# Patient Record
Sex: MALE | Race: WHITE | NOT HISPANIC OR LATINO | Employment: FULL TIME | URBAN - METROPOLITAN AREA
[De-identification: names, ages, dates, MRNs, and addresses within clinical notes are randomized per-mention and may not be internally consistent; named-entity substitution may affect disease eponyms.]

---

## 2023-04-19 ENCOUNTER — HOSPITAL ENCOUNTER (OUTPATIENT)
Dept: DATA CONVERSION | Facility: HOSPITAL | Age: 68
End: 2023-04-19
Attending: OPHTHALMOLOGY | Admitting: OPHTHALMOLOGY
Payer: MEDICARE

## 2023-04-19 DIAGNOSIS — E78.5 HYPERLIPIDEMIA, UNSPECIFIED: ICD-10-CM

## 2023-04-19 DIAGNOSIS — I10 ESSENTIAL (PRIMARY) HYPERTENSION: ICD-10-CM

## 2023-04-19 DIAGNOSIS — Z79.84 LONG TERM (CURRENT) USE OF ORAL HYPOGLYCEMIC DRUGS: ICD-10-CM

## 2023-04-19 DIAGNOSIS — Z91.040 LATEX ALLERGY STATUS: ICD-10-CM

## 2023-04-19 DIAGNOSIS — K21.9 GASTRO-ESOPHAGEAL REFLUX DISEASE WITHOUT ESOPHAGITIS: ICD-10-CM

## 2023-04-19 DIAGNOSIS — R06.83 SNORING: ICD-10-CM

## 2023-04-19 DIAGNOSIS — H25.12 AGE-RELATED NUCLEAR CATARACT, LEFT EYE: ICD-10-CM

## 2023-04-19 DIAGNOSIS — F79 UNSPECIFIED INTELLECTUAL DISABILITIES: ICD-10-CM

## 2023-04-19 DIAGNOSIS — G80.9 CEREBRAL PALSY, UNSPECIFIED (MULTI): ICD-10-CM

## 2023-04-19 DIAGNOSIS — E11.9 TYPE 2 DIABETES MELLITUS WITHOUT COMPLICATIONS (MULTI): ICD-10-CM

## 2023-04-19 DIAGNOSIS — H40.9 UNSPECIFIED GLAUCOMA: ICD-10-CM

## 2023-04-19 DIAGNOSIS — J44.9 CHRONIC OBSTRUCTIVE PULMONARY DISEASE, UNSPECIFIED (MULTI): ICD-10-CM

## 2023-04-19 DIAGNOSIS — G47.33 OBSTRUCTIVE SLEEP APNEA (ADULT) (PEDIATRIC): ICD-10-CM

## 2023-04-19 DIAGNOSIS — Z79.899 OTHER LONG TERM (CURRENT) DRUG THERAPY: ICD-10-CM

## 2023-04-19 DIAGNOSIS — F17.210 NICOTINE DEPENDENCE, CIGARETTES, UNCOMPLICATED: ICD-10-CM

## 2023-04-19 LAB — POCT GLUCOSE: 122 MG/DL (ref 74–99)

## 2023-06-13 LAB
ALANINE AMINOTRANSFERASE (SGPT) (U/L) IN SER/PLAS: 14 U/L (ref 10–52)
ALBUMIN (G/DL) IN SER/PLAS: 4.2 G/DL (ref 3.4–5)
ALBUMIN (MG/L) IN URINE: 37.8 MG/L
ALBUMIN/CREATININE (UG/MG) IN URINE: 52.6 UG/MG CRT (ref 0–30)
ALKALINE PHOSPHATASE (U/L) IN SER/PLAS: 58 U/L (ref 33–136)
ANION GAP IN SER/PLAS: 10 MMOL/L (ref 10–20)
APPEARANCE, URINE: ABNORMAL
ASPARTATE AMINOTRANSFERASE (SGOT) (U/L) IN SER/PLAS: 15 U/L (ref 9–39)
BACTERIA, URINE: ABNORMAL /HPF
BILIRUBIN TOTAL (MG/DL) IN SER/PLAS: 0.5 MG/DL (ref 0–1.2)
BILIRUBIN, URINE: NEGATIVE
BLOOD, URINE: NEGATIVE
CALCIUM (MG/DL) IN SER/PLAS: 11 MG/DL (ref 8.6–10.6)
CALCIUM OXALATE CRYSTALS, URINE: ABNORMAL /HPF
CARBON DIOXIDE, TOTAL (MMOL/L) IN SER/PLAS: 31 MMOL/L (ref 21–32)
CHLORIDE (MMOL/L) IN SER/PLAS: 106 MMOL/L (ref 98–107)
CHOLESTEROL (MG/DL) IN SER/PLAS: 123 MG/DL (ref 0–199)
CHOLESTEROL IN HDL (MG/DL) IN SER/PLAS: 47.3 MG/DL
CHOLESTEROL/HDL RATIO: 2.6
COLOR, URINE: YELLOW
CREATININE (MG/DL) IN SER/PLAS: 0.98 MG/DL (ref 0.5–1.3)
CREATININE (MG/DL) IN URINE: 71.9 MG/DL (ref 20–370)
ESTIMATED AVERAGE GLUCOSE FOR HBA1C: 140 MG/DL
GFR MALE: 84 ML/MIN/1.73M2
GLUCOSE (MG/DL) IN SER/PLAS: 120 MG/DL (ref 74–99)
GLUCOSE, URINE: NEGATIVE MG/DL
HEMOGLOBIN A1C/HEMOGLOBIN TOTAL IN BLOOD: 6.5 %
KETONES, URINE: NEGATIVE MG/DL
LDL: 62 MG/DL (ref 0–99)
LEUKOCYTE ESTERASE, URINE: ABNORMAL
MUCUS, URINE: ABNORMAL /LPF
NITRITE, URINE: NEGATIVE
PH, URINE: 5 (ref 5–8)
POTASSIUM (MMOL/L) IN SER/PLAS: 4.7 MMOL/L (ref 3.5–5.3)
PROTEIN TOTAL: 7 G/DL (ref 6.4–8.2)
PROTEIN, URINE: NEGATIVE MG/DL
RBC, URINE: ABNORMAL /HPF (ref 0–5)
SODIUM (MMOL/L) IN SER/PLAS: 142 MMOL/L (ref 136–145)
SPECIFIC GRAVITY, URINE: 1.01 (ref 1–1.03)
TRIGLYCERIDE (MG/DL) IN SER/PLAS: 68 MG/DL (ref 0–149)
UREA NITROGEN (MG/DL) IN SER/PLAS: 23 MG/DL (ref 6–23)
UROBILINOGEN, URINE: <2 MG/DL (ref 0–1.9)
VLDL: 14 MG/DL (ref 0–40)
WBC, URINE: 17 /HPF (ref 0–5)

## 2023-09-07 VITALS
SYSTOLIC BLOOD PRESSURE: 113 MMHG | TEMPERATURE: 98.2 F | HEART RATE: 85 BPM | RESPIRATION RATE: 16 BRPM | DIASTOLIC BLOOD PRESSURE: 71 MMHG

## 2023-09-11 PROBLEM — H40.9 GLAUCOMA OF BOTH EYES: Status: ACTIVE | Noted: 2023-09-11

## 2023-09-11 PROBLEM — H10.10 ACUTE ALLERGIC CONJUNCTIVITIS: Status: ACTIVE | Noted: 2023-09-11

## 2023-09-11 PROBLEM — H25.12 CATARACT, NUCLEAR SCLEROTIC, LEFT EYE: Status: ACTIVE | Noted: 2023-09-11

## 2023-09-11 PROBLEM — H04.123 DRY EYE SYNDROME, BILATERAL: Status: ACTIVE | Noted: 2023-09-11

## 2023-09-11 PROBLEM — H72.92 PERFORATION OF LEFT TYMPANIC MEMBRANE: Status: ACTIVE | Noted: 2023-09-11

## 2023-09-11 PROBLEM — F79 INTELLECTUAL DISABILITY: Status: ACTIVE | Noted: 2023-09-11

## 2023-09-11 PROBLEM — H61.23 BILATERAL IMPACTED CERUMEN: Status: ACTIVE | Noted: 2023-09-11

## 2023-09-11 PROBLEM — E11.9 DIABETES MELLITUS TYPE 2 WITHOUT RETINOPATHY (MULTI): Status: ACTIVE | Noted: 2023-09-11

## 2023-09-11 PROBLEM — G80.9 CEREBRAL PALSY (MULTI): Status: ACTIVE | Noted: 2023-09-11

## 2023-09-11 PROBLEM — H44.22: Status: ACTIVE | Noted: 2023-09-11

## 2023-09-11 PROBLEM — H90.6 MIXED CONDUCTIVE AND SENSORINEURAL HEARING LOSS OF BOTH EARS: Status: ACTIVE | Noted: 2023-09-11

## 2023-09-11 PROBLEM — H40.1190 PRIMARY OPEN ANGLE GLAUCOMA: Status: ACTIVE | Noted: 2023-09-11

## 2023-09-11 PROBLEM — H18.20 CORNEAL EDEMA OF RIGHT EYE: Status: ACTIVE | Noted: 2023-09-11

## 2023-09-11 PROBLEM — H69.93 DYSFUNCTION OF BOTH EUSTACHIAN TUBES: Status: ACTIVE | Noted: 2023-09-11

## 2023-09-11 PROBLEM — E78.00 HIGH CHOLESTEROL: Status: ACTIVE | Noted: 2023-09-11

## 2023-09-11 RX ORDER — BRIMONIDINE TARTRATE 2 MG/ML
1 SOLUTION/ DROPS OPHTHALMIC 2 TIMES DAILY
COMMUNITY
Start: 2023-03-20 | End: 2023-11-21 | Stop reason: SDUPTHER

## 2023-09-11 RX ORDER — NITROFURANTOIN (MACROCRYSTALS) 100 MG/1
100 CAPSULE ORAL
COMMUNITY
Start: 2021-10-25 | End: 2024-03-18 | Stop reason: WASHOUT

## 2023-09-11 RX ORDER — MONTELUKAST SODIUM 10 MG/1
10 TABLET ORAL DAILY
COMMUNITY
End: 2024-02-29 | Stop reason: SDUPTHER

## 2023-09-11 RX ORDER — TOBRAMYCIN AND DEXAMETHASONE 3; 1 MG/G; MG/G
OINTMENT OPHTHALMIC
COMMUNITY
End: 2024-03-18 | Stop reason: WASHOUT

## 2023-09-11 RX ORDER — IBUPROFEN 100 MG/5ML
1000 SUSPENSION, ORAL (FINAL DOSE FORM) ORAL DAILY
COMMUNITY

## 2023-09-11 RX ORDER — FLAXSEED OIL 1000 MG
1000 CAPSULE ORAL DAILY
COMMUNITY

## 2023-09-11 RX ORDER — OLOPATADINE HYDROCHLORIDE 2 MG/ML
1 SOLUTION/ DROPS OPHTHALMIC
COMMUNITY
Start: 2014-09-08

## 2023-09-11 RX ORDER — BLOOD-GLUCOSE METER
KIT MISCELLANEOUS
COMMUNITY
End: 2023-11-16 | Stop reason: SDUPTHER

## 2023-09-11 RX ORDER — ACETAZOLAMIDE 500 MG/1
500 CAPSULE, EXTENDED RELEASE ORAL 2 TIMES DAILY
COMMUNITY
End: 2023-11-02 | Stop reason: WASHOUT

## 2023-09-11 RX ORDER — ARTIFICIAL TEARS 1; 2; 3 MG/ML; MG/ML; MG/ML
1 SOLUTION/ DROPS OPHTHALMIC 4 TIMES DAILY
COMMUNITY

## 2023-09-11 RX ORDER — GLIMEPIRIDE 4 MG/1
1 TABLET ORAL DAILY
COMMUNITY
Start: 2023-03-09 | End: 2024-06-05 | Stop reason: SDUPTHER

## 2023-09-11 RX ORDER — PANTOPRAZOLE SODIUM 40 MG/1
40 TABLET, DELAYED RELEASE ORAL DAILY
COMMUNITY
End: 2023-10-24 | Stop reason: SDUPTHER

## 2023-09-11 RX ORDER — METFORMIN HYDROCHLORIDE 500 MG/1
1000 TABLET, EXTENDED RELEASE ORAL 2 TIMES DAILY
COMMUNITY
End: 2023-12-27 | Stop reason: SDUPTHER

## 2023-09-11 RX ORDER — KETOROLAC TROMETHAMINE 5 MG/ML
1 SOLUTION OPHTHALMIC 4 TIMES DAILY
COMMUNITY
Start: 2023-02-14

## 2023-09-11 RX ORDER — OFLOXACIN 3 MG/ML
SOLUTION/ DROPS OPHTHALMIC
COMMUNITY
Start: 2023-02-14 | End: 2024-06-07 | Stop reason: WASHOUT

## 2023-09-11 RX ORDER — LOSARTAN POTASSIUM 25 MG/1
25 TABLET ORAL DAILY
COMMUNITY
End: 2024-02-29 | Stop reason: SDUPTHER

## 2023-09-11 RX ORDER — PREDNISOLONE ACETATE 10 MG/ML
1 SUSPENSION/ DROPS OPHTHALMIC 4 TIMES DAILY
COMMUNITY
Start: 2023-02-14 | End: 2024-03-18 | Stop reason: WASHOUT

## 2023-09-11 RX ORDER — ATORVASTATIN CALCIUM 40 MG/1
40 TABLET, FILM COATED ORAL DAILY
COMMUNITY

## 2023-09-11 RX ORDER — BRIMONIDINE TARTRATE 1.5 MG/ML
SOLUTION/ DROPS OPHTHALMIC
COMMUNITY
Start: 2021-06-15 | End: 2023-11-17 | Stop reason: SDUPTHER

## 2023-09-11 RX ORDER — MINERAL OIL
180 ENEMA (ML) RECTAL DAILY PRN
COMMUNITY

## 2023-09-14 NOTE — H&P
History of Present Illness:   History Present Illness:  Reason for surgery: Cataract and glaucoma, left eye   HPI:    Patient presents for planned cataract extraction with IOL implantation and Express shunt implantation, left eye, for glaucoma and cataract, left eye    Allergies:        Allergies:  ·  Latex : Itching  ·  bacitracin  eye ointment - redness, eye soreness: Unknown    Home Medication Review:   Home Medications Reviewed: yes     Impression/Procedure:   ·  Impression and Planned Procedure: Patient presents for planned cataract extraction with IOL implantation and Express shunt implantation, left eye, for glaucoma and cataract, left eye       ERAS (Enhanced Recovery After Surgery):  ·  ERAS Patient: no       Vital Signs:  Temperature C: 36.8 degrees C   Temperature F: 98.2 degrees F   Heart Rate: 85 beats per minute   Respiratory Rate: 16 breath per minute   Blood Pressure Systolic: 113 mm/Hg   Blood Pressure Diastolic: 71 mm/Hg     Physical Exam by System:    Constitutional: Well appearing, no acute distress   Head/Neck: Normocephalic atraumatic   Respiratory/Thorax: No respiratory distress, equal  chest rise   Cardiovascular: Well perfused, regular rate   Gastrointestinal: Non distended   Neurological: Alert, oriented, interactive   Psychological: Normal affect   Skin: No gross rash or skin breakdown     Consent:   COVID-19 Consent:  ·  COVID-19 Risk Consent Surgeon has reviewed key risks related to the risk of ryanne COVID-19 and if they contract COVID-19 what the risks are.     Attestation:   Note Completion:  I am a:  Resident/Fellow   Attending Attestation I saw and evaluated the patient.  I personally obtained the key and critical portions of the history and physical exam or was physically present for key and  critical portions performed by the resident/fellow. I reviewed the resident/fellow?s documentation and discussed the patient with the resident/fellow.  I agree with the  resident/fellow?s medical decision making as documented in the note.     I personally evaluated the patient on 19-Apr-2023         Electronic Signatures:  Floyd Rivera (Resident))  (Signed 19-Apr-2023 07:21)   Authored: History of Present Illness, Allergies, Home  Medication Review, Impression/Procedure, ERAS, Physical Exam, Consent, Note Completion  Panfilo Ingram)  (Signed 30-Apr-2023 14:37)   Authored: Note Completion   Co-Signer: History of Present Illness, Allergies, Home Medication Review, Impression/Procedure, ERAS, Physical Exam, Consent, Note Completion      Last Updated: 30-Apr-2023 14:37 by Panfilo Ingram (MD)

## 2023-10-02 NOTE — OP NOTE
Post Operative Note:     PreOp Diagnosis: Cataract, left eye and glaucoma,  left eye   Post-Procedure Diagnosis: Cataract, left eye and  glaucoma, left eye   Procedure: 1. Cataract extraction via phacoemulsification  with intraocular lens implantation, left eye  2.   3.   4.   5.   Surgeon: Dr Ingram   Resident/Fellow/Other Assistant: Dr Rivera   Estimated Blood Loss (mL): <1cc   Specimen: no   Findings: Cataract, left eye     Attestation:   Note Completion:  I am a: Resident/Fellow   Attending Attestation I performed the procedure without a resident          Electronic Signatures:  Floyd Rivera (Resident))  (Signed 19-Apr-2023 09:00)   Authored: Post Operative Note, Note Completion  Panfilo Ingram)  (Signed 30-Apr-2023 14:37)   Authored: Note Completion   Co-Signer: Post Operative Note, Note Completion      Last Updated: 30-Apr-2023 14:37 by Panfilo Ingram)

## 2023-10-04 ENCOUNTER — LAB (OUTPATIENT)
Dept: LAB | Facility: LAB | Age: 68
End: 2023-10-04
Payer: MEDICARE

## 2023-10-04 ENCOUNTER — TELEPHONE (OUTPATIENT)
Dept: PRIMARY CARE | Facility: CLINIC | Age: 68
End: 2023-10-04

## 2023-10-04 DIAGNOSIS — Z87.891 SMOKING HISTORY: ICD-10-CM

## 2023-10-04 DIAGNOSIS — E11.9 TYPE 2 DIABETES MELLITUS WITHOUT COMPLICATIONS (MULTI): Primary | ICD-10-CM

## 2023-10-04 DIAGNOSIS — E78.2 MIXED HYPERLIPIDEMIA: ICD-10-CM

## 2023-10-04 DIAGNOSIS — Z72.0 TOBACCO ABUSE: Primary | ICD-10-CM

## 2023-10-04 DIAGNOSIS — D63.8 ANEMIA IN OTHER CHRONIC DISEASES CLASSIFIED ELSEWHERE: ICD-10-CM

## 2023-10-04 DIAGNOSIS — I10 ESSENTIAL (PRIMARY) HYPERTENSION: ICD-10-CM

## 2023-10-04 LAB
ALBUMIN SERPL BCP-MCNC: 4.2 G/DL (ref 3.4–5)
ALP SERPL-CCNC: 52 U/L (ref 33–136)
ALT SERPL W P-5'-P-CCNC: 15 U/L (ref 10–52)
ANION GAP SERPL CALC-SCNC: 13 MMOL/L (ref 10–20)
AST SERPL W P-5'-P-CCNC: 15 U/L (ref 9–39)
BASOPHILS # BLD AUTO: 0.03 X10*3/UL (ref 0–0.1)
BASOPHILS NFR BLD AUTO: 0.6 %
BILIRUB SERPL-MCNC: 0.4 MG/DL (ref 0–1.2)
BUN SERPL-MCNC: 22 MG/DL (ref 6–23)
CALCIUM SERPL-MCNC: 11 MG/DL (ref 8.6–10.6)
CHLORIDE SERPL-SCNC: 104 MMOL/L (ref 98–107)
CHOLEST SERPL-MCNC: 131 MG/DL (ref 0–199)
CHOLESTEROL/HDL RATIO: 3
CO2 SERPL-SCNC: 29 MMOL/L (ref 21–32)
CREAT SERPL-MCNC: 0.98 MG/DL (ref 0.5–1.3)
EOSINOPHIL # BLD AUTO: 0 X10*3/UL (ref 0–0.7)
EOSINOPHIL NFR BLD AUTO: 0 %
ERYTHROCYTE [DISTWIDTH] IN BLOOD BY AUTOMATED COUNT: 13.7 % (ref 11.5–14.5)
EST. AVERAGE GLUCOSE BLD GHB EST-MCNC: 148 MG/DL
GFR SERPL CREATININE-BSD FRML MDRD: 85 ML/MIN/1.73M*2
GLUCOSE SERPL-MCNC: 118 MG/DL (ref 74–99)
HBA1C MFR BLD: 6.8 %
HCT VFR BLD AUTO: 36.4 % (ref 41–52)
HDLC SERPL-MCNC: 44 MG/DL
HGB BLD-MCNC: 11.6 G/DL (ref 13.5–17.5)
IMM GRANULOCYTES # BLD AUTO: 0.01 X10*3/UL (ref 0–0.7)
IMM GRANULOCYTES NFR BLD AUTO: 0.2 % (ref 0–0.9)
LDLC SERPL CALC-MCNC: 71 MG/DL (ref 140–190)
LYMPHOCYTES # BLD AUTO: 1.75 X10*3/UL (ref 1.2–4.8)
LYMPHOCYTES NFR BLD AUTO: 33.3 %
MCH RBC QN AUTO: 32.7 PG (ref 26–34)
MCHC RBC AUTO-ENTMCNC: 31.9 G/DL (ref 32–36)
MCV RBC AUTO: 103 FL (ref 80–100)
MONOCYTES # BLD AUTO: 0.72 X10*3/UL (ref 0.1–1)
MONOCYTES NFR BLD AUTO: 13.7 %
NEUTROPHILS # BLD AUTO: 2.75 X10*3/UL (ref 1.2–7.7)
NEUTROPHILS NFR BLD AUTO: 52.2 %
NON HDL CHOLESTEROL: 87 MG/DL (ref 0–149)
NRBC BLD-RTO: 0 /100 WBCS (ref 0–0)
PLATELET # BLD AUTO: 222 X10*3/UL (ref 150–450)
PMV BLD AUTO: 11.4 FL (ref 7.5–11.5)
POTASSIUM SERPL-SCNC: 5.2 MMOL/L (ref 3.5–5.3)
PROT SERPL-MCNC: 6.9 G/DL (ref 6.4–8.2)
RBC # BLD AUTO: 3.55 X10*6/UL (ref 4.5–5.9)
SODIUM SERPL-SCNC: 141 MMOL/L (ref 136–145)
TRIGL SERPL-MCNC: 82 MG/DL (ref 0–149)
TSH SERPL-ACNC: 2.03 MIU/L (ref 0.44–3.98)
VIT B12 SERPL-MCNC: 549 PG/ML (ref 211–911)
VLDL: 16 MG/DL (ref 0–40)
WBC # BLD AUTO: 5.3 X10*3/UL (ref 4.4–11.3)

## 2023-10-04 PROCEDURE — 36415 COLL VENOUS BLD VENIPUNCTURE: CPT

## 2023-10-04 NOTE — TELEPHONE ENCOUNTER
Pts insurance will not accept Low dose ct scan because it has the wrong code on paper.    Please with right code to Fax- [phone]

## 2023-10-06 NOTE — TELEPHONE ENCOUNTER
Christian calling from [facility] is calling in regards to the message yesterday, the ct scan code. He said he did not receive the new order.  Fax number- [phone]

## 2023-10-16 ENCOUNTER — OFFICE VISIT (OUTPATIENT)
Dept: OPHTHALMOLOGY | Facility: CLINIC | Age: 68
End: 2023-10-16
Payer: MEDICARE

## 2023-10-16 DIAGNOSIS — H40.1133 PRIMARY OPEN ANGLE GLAUCOMA (POAG) OF BOTH EYES, SEVERE STAGE: Primary | ICD-10-CM

## 2023-10-16 PROCEDURE — 99212 OFFICE O/P EST SF 10 MIN: CPT | Performed by: OPHTHALMOLOGY

## 2023-10-16 ASSESSMENT — CUP TO DISC RATIO
OS_RATIO: 95
OD_RATIO: 99

## 2023-10-16 ASSESSMENT — TONOMETRY
OS_IOP_MMHG: 10
OD_IOP_MMHG: 14
IOP_METHOD: GOLDMANN APPLANATION

## 2023-10-16 ASSESSMENT — VISUAL ACUITY
OD_SC: NLP
METHOD: SNELLEN - LINEAR
OS_SC: CF

## 2023-10-16 ASSESSMENT — SLIT LAMP EXAM - LIDS
COMMENTS: NORMAL
COMMENTS: NORMAL

## 2023-10-17 ENCOUNTER — OFFICE VISIT (OUTPATIENT)
Dept: OTOLARYNGOLOGY | Facility: CLINIC | Age: 68
End: 2023-10-17
Payer: MEDICARE

## 2023-10-17 ENCOUNTER — ANCILLARY PROCEDURE (OUTPATIENT)
Dept: RADIOLOGY | Facility: CLINIC | Age: 68
End: 2023-10-17
Payer: MEDICARE

## 2023-10-17 ENCOUNTER — APPOINTMENT (OUTPATIENT)
Dept: RADIOLOGY | Facility: CLINIC | Age: 68
End: 2023-10-17
Payer: MEDICARE

## 2023-10-17 VITALS — WEIGHT: 161 LBS | BODY MASS INDEX: 24.4 KG/M2 | TEMPERATURE: 97.5 F | HEIGHT: 68 IN

## 2023-10-17 DIAGNOSIS — Z87.891 SMOKING HISTORY: ICD-10-CM

## 2023-10-17 DIAGNOSIS — H90.6 MIXED CONDUCTIVE AND SENSORINEURAL HEARING LOSS OF BOTH EARS: ICD-10-CM

## 2023-10-17 DIAGNOSIS — F79 INTELLECTUAL DISABILITY: ICD-10-CM

## 2023-10-17 DIAGNOSIS — H69.93 DYSFUNCTION OF BOTH EUSTACHIAN TUBES: Primary | ICD-10-CM

## 2023-10-17 PROCEDURE — 4010F ACE/ARB THERAPY RXD/TAKEN: CPT | Performed by: OTOLARYNGOLOGY

## 2023-10-17 PROCEDURE — 71271 CT THORAX LUNG CANCER SCR C-: CPT | Performed by: RADIOLOGY

## 2023-10-17 PROCEDURE — 3044F HG A1C LEVEL LT 7.0%: CPT | Performed by: OTOLARYNGOLOGY

## 2023-10-17 PROCEDURE — 3048F LDL-C <100 MG/DL: CPT | Performed by: OTOLARYNGOLOGY

## 2023-10-17 PROCEDURE — 71271 CT THORAX LUNG CANCER SCR C-: CPT | Mod: ME

## 2023-10-17 PROCEDURE — 1159F MED LIST DOCD IN RCRD: CPT | Performed by: OTOLARYNGOLOGY

## 2023-10-17 PROCEDURE — 99213 OFFICE O/P EST LOW 20 MIN: CPT | Performed by: OTOLARYNGOLOGY

## 2023-10-17 NOTE — PROGRESS NOTES
Chief Complaint   Patient presents with    Follow-up     EP- EAR CHECK      HPI  He is here for every 3 month ear cleaning  Benjie Waters is a 68 y.o. male with recurrent cerumen impactions. He is s/p left tube placed March 23, 2022 and a right perforation. 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   History: This is a 66-year-old white male with severe MR. He is status post left myringotomy that has healed and right tympanic membrane perforation. He has a chronic eustachian tube dysfunction bilaterally. He smokes. He is using Allegra for his allergies      Past Medical History:   Diagnosis Date    Acute atopic conjunctivitis, unspecified eye 12/27/2017    Acute allergic conjunctivitis    Acute atopic conjunctivitis, unspecified eye 12/27/2017    Acute allergic conjunctivitis    Aphakia, unspecified eye 10/04/2022    Aphakia    Disorder of lipoprotein metabolism, unspecified     Abnormal cholesterol test    Dry eye syndrome of bilateral lacrimal glands 12/27/2017    Dry eye syndrome, bilateral    Dry eye syndrome of bilateral lacrimal glands 12/27/2017    Dry eye syndrome, bilateral    Encounter for other general examination     Podiatry visit, routine    Essential (primary) hypertension 07/27/2013    Hypertension    Ischemic optic neuropathy, unspecified eye 10/04/2022    AION (anterior ischemic optic neuropathy)    Other specified diabetes mellitus without complications (CMS/HCC)     Diabetes mellitus of other type without complication    Personal history of other diseases of the nervous system and sense organs     History of obstructive sleep apnea    Personal history of other diseases of the respiratory system     History of chronic obstructive lung disease    Personal history of other infectious and parasitic diseases     History of athlete's foot    Primary open-angle glaucoma, unspecified eye, severe stage 12/27/2017    Chronic glaucoma, severe stage    Tinea unguium      Mycotic toenails            Medications:     Current Outpatient Medications:     acetaZOLAMIDE (Diamox) 500 mg 12 hr capsule, Take 1 capsule (500 mg) by mouth 2 times a day. TAKE ONCE AT 6:00 PM AFTER EFT EYE CATARACT SURGERY, Disp: , Rfl:     artificial tears, dextran-hypomel-glycerin, 0.1-0.3-0.2 % ophthalmic solution, Administer 1 drop into affected eye(s) 4 times a day., Disp: , Rfl:     ascorbic acid (Vitamin C) 1,000 mg tablet, Take 1 tablet (1,000 mg) by mouth once daily., Disp: , Rfl:     atorvastatin (Lipitor) 40 mg tablet, Take 1 tablet (40 mg) by mouth once daily., Disp: , Rfl:     brimonidine (AlphaGAN P) 0.15 % ophthalmic solution, Administer into affected eye(s). PER DIRECTED, Disp: , Rfl:     brimonidine (AlphaGAN P) 0.2 % ophthalmic solution, Administer 1 drop into both eyes 2 times a day., Disp: , Rfl:     fexofenadine (Allegra Allergy) 180 mg tablet, Take 1 tablet (180 mg) by mouth. TAKE PER DIRECTED, Disp: , Rfl:     flaxseed oiL 1,000 mg capsule, Take by mouth. TAKE PER DIECTED, Disp: , Rfl:     FreeStyle Lite Strips strip, DO PER DIRECTED, Disp: , Rfl:     glimepiride (Amaryl) 4 mg tablet, Take 1 tablet (4 mg) by mouth once daily., Disp: , Rfl:     GLIMEPIRIDE ORAL, Take by mouth. PER DIRECTED, Disp: , Rfl:     ketorolac (Acular) 0.5 % ophthalmic solution, 1 drop 4 times a day. OPERATIVE EYE, Disp: , Rfl:     losartan (Cozaar) 25 mg tablet, Take 1 tablet (25 mg) by mouth once daily., Disp: , Rfl:     metFORMIN  mg 24 hr tablet, Take 2 tablets (1,000 mg) by mouth 2 times a day., Disp: , Rfl:     montelukast (Singulair) 10 mg tablet, Take 1 tablet (10 mg) by mouth once daily., Disp: , Rfl:     multivit-min/FA/lycopen/lutein (COMPLETE MULTI 50+ ORAL), Take 1 capsule by mouth once daily., Disp: , Rfl:     nitrofurantoin (Macrodantin) 100 mg capsule, Take 1 capsule (100 mg) by mouth. TAKE PER DIRECTED, Disp: , Rfl:     ofloxacin (Ocuflox) 0.3 % ophthalmic solution, DO PER DIRECTED, Disp: , Rfl:  "    olopatadine (Pataday) 0.2 % ophthalmic solution, Administer 1 drop into affected eye(s) once daily. PER DIRECTED, Disp: , Rfl:     pantoprazole (ProtoNix) 40 mg EC tablet, Take 1 tablet (40 mg) by mouth once daily., Disp: , Rfl:     prednisoLONE acetate (Pred-Forte) 1 % ophthalmic suspension, 1 drop 4 times a day. AFFECTED EYE, Disp: , Rfl:     Tobradex ophthalmic ointment, DO PER DIRECTD, Disp: , Rfl:     vit A/vit C/vit E/zinc/copper (VITAMINS A,C,E-ZINC-COPPER ORAL), Take by mouth. TAKE PER DIRECTED, Disp: , Rfl:      Allergies:  Allergies   Allergen Reactions    Bacitracin Unknown    Erythromycin Unknown        Physical Exam:  Last Recorded Vitals  Temperature 36.4 °C (97.5 °F), height 1.727 m (5' 8\"), weight 73 kg (161 lb).  []General appearance: Well-developed, well-nourished in no acute distress, conversant with normal voice quality    Head/face: No erythema or edema or facial tenderness, and normal facial nerve function bilaterally    External ear: Clear external auditory canals with normal pinnae, bilateral cerumen impactions removed  Tube status: Left T-tube in place  Middle ear: Large right tympanic membrane perforation.  Left middle ear mucosa looks normal  Tympanic membrane perforation: N/A  Mastoid bowl: N/A  Hearing: Normal conversational awareness at normal speech thresholds    Nose visualized using: Anterior rhinoscopy  Nasal dorsum: Nontraumatic midline appearance  Septum: Midline, nonobstructing  Inferior turbinates: Normal, pink  Secretions: Dry    Oral cavity and oropharynx: Normal  Teeth: Good condition  Floor of mouth: without lesions  Palate: Normal hard palate, soft palate and uvula  Oropharynx: Clear, no lesions present  Buccal mucosa: Normal without masses or lesions  Lips: Normal    Nasopharynx: Inadequate mirror exam secondary to gag/anatomy    Neck:  Salivary glands: Normal bilateral parotid and submandibular glands by inspection and palpation.  Non-thyroid masses: No palpable " masses or significant lymphadenopathy  Trachea: Midline  Thyroid: No thyromegaly or palpable nodules  Temporomandibular joint: Nontender  Cervical range of motion: Normal    Neurologic exam: Alert and oriented x3, appropriate affect.  Cranial nerves II-XII normal bilaterally  Extraocular movement: Extraocular movement intact, normal gaze alignment    Assessment/Plan   No diagnosis found.      Recheck in 3 months.  Tube in good position.    Lupillo Weinstein MD

## 2023-10-20 ENCOUNTER — TELEPHONE (OUTPATIENT)
Dept: PRIMARY CARE | Facility: CLINIC | Age: 68
End: 2023-10-20
Payer: MEDICARE

## 2023-10-20 DIAGNOSIS — D64.9 ANEMIA, UNSPECIFIED TYPE: ICD-10-CM

## 2023-10-20 NOTE — TELEPHONE ENCOUNTER
Pt sister says message was left for a fax number to be given for CT scan. Fax Number- [phone]

## 2023-10-20 NOTE — TELEPHONE ENCOUNTER
----- Message from Meseret De La Torre PA-C sent at 10/20/2023  7:51 AM EDT -----  Regarding: labs  Please let him know his anemia is a little worse. He needs to follow up with hematology. If he needs a referral, ok to refer to  Hematology  ----- Message -----  From: Lab, Background User  Sent: 10/4/2023   1:41 PM EDT  To: Meseret De La Torre PA-C

## 2023-10-24 DIAGNOSIS — K21.9 CHRONIC GERD: ICD-10-CM

## 2023-10-24 RX ORDER — PANTOPRAZOLE SODIUM 40 MG/1
40 TABLET, DELAYED RELEASE ORAL DAILY
Qty: 90 TABLET | Refills: 0 | Status: SHIPPED | OUTPATIENT
Start: 2023-10-24 | End: 2024-01-22

## 2023-10-24 NOTE — TELEPHONE ENCOUNTER
Rx Refill Request Telephone Encounter    Name:  Benjie Waters  :  [id number]  Medication Name:   Pantoprazole 40 mg            Specific Pharmacy location:   [facility] [phone]  Date of last appointment:    Date of next appointment:    Best number to reach patient:

## 2023-11-02 ENCOUNTER — OFFICE VISIT (OUTPATIENT)
Dept: PRIMARY CARE | Facility: CLINIC | Age: 68
End: 2023-11-02
Payer: MEDICARE

## 2023-11-02 VITALS
WEIGHT: 161 LBS | OXYGEN SATURATION: 99 % | BODY MASS INDEX: 24.48 KG/M2 | SYSTOLIC BLOOD PRESSURE: 114 MMHG | DIASTOLIC BLOOD PRESSURE: 64 MMHG | HEART RATE: 74 BPM

## 2023-11-02 DIAGNOSIS — E78.00 HIGH CHOLESTEROL: Primary | ICD-10-CM

## 2023-11-02 DIAGNOSIS — R91.1 LUNG NODULE: ICD-10-CM

## 2023-11-02 DIAGNOSIS — G80.8 OTHER CEREBRAL PALSY (MULTI): ICD-10-CM

## 2023-11-02 DIAGNOSIS — J43.8 OTHER EMPHYSEMA (MULTI): ICD-10-CM

## 2023-11-02 PROCEDURE — 99212 OFFICE O/P EST SF 10 MIN: CPT | Performed by: PHYSICIAN ASSISTANT

## 2023-11-02 PROCEDURE — 3078F DIAST BP <80 MM HG: CPT | Performed by: PHYSICIAN ASSISTANT

## 2023-11-02 PROCEDURE — 3048F LDL-C <100 MG/DL: CPT | Performed by: PHYSICIAN ASSISTANT

## 2023-11-02 PROCEDURE — 3074F SYST BP LT 130 MM HG: CPT | Performed by: PHYSICIAN ASSISTANT

## 2023-11-02 PROCEDURE — 4010F ACE/ARB THERAPY RXD/TAKEN: CPT | Performed by: PHYSICIAN ASSISTANT

## 2023-11-02 PROCEDURE — 1159F MED LIST DOCD IN RCRD: CPT | Performed by: PHYSICIAN ASSISTANT

## 2023-11-02 PROCEDURE — 3044F HG A1C LEVEL LT 7.0%: CPT | Performed by: PHYSICIAN ASSISTANT

## 2023-11-02 PROCEDURE — 1126F AMNT PAIN NOTED NONE PRSNT: CPT | Performed by: PHYSICIAN ASSISTANT

## 2023-11-02 ASSESSMENT — PAIN SCALES - GENERAL: PAINLEVEL: 0-NO PAIN

## 2023-11-02 NOTE — PROGRESS NOTES
Subjective   Patient ID: Benjie Waters is a 68 y.o. male who presents for Follow-up (Lung CT).    Patient is here with his sister for follow-up of his CT scan.  She states he did receive a letter from the hospital stating he had a slight abnormality and need another CT in 3 months.Patient has a longstanding history of smoking.         Review of Systems   Respiratory:  Positive for cough and wheezing. Negative for choking, chest tightness and shortness of breath.        Objective   /64   Pulse 74   Wt 73 kg (161 lb)   SpO2 99%   BMI 24.48 kg/m²     Physical Exam  Neurological:      Mental Status: He is alert. Mental status is at baseline.   Psychiatric:         Mood and Affect: Mood normal.         Thought Content: Thought content normal.         Assessment/Plan   Problem List Items Addressed This Visit             ICD-10-CM    Cerebral palsy (CMS/HCC) G80.9    High cholesterol - Primary E78.00    Relevant Orders    CT cardiac scoring wo IV contrast     Other Visit Diagnoses         Codes    Lung nodule     R91.1    Other emphysema (CMS/HCC)     J43.8        We will repeat a CT in 3 months.  Discussed the importance of quitting smoking as well.

## 2023-11-04 ASSESSMENT — ENCOUNTER SYMPTOMS
COUGH: 1
CHEST TIGHTNESS: 0
CHOKING: 0
SHORTNESS OF BREATH: 0
WHEEZING: 1

## 2023-11-16 DIAGNOSIS — E11.9 DIABETES MELLITUS TYPE 2 WITHOUT RETINOPATHY (MULTI): ICD-10-CM

## 2023-11-16 RX ORDER — BLOOD-GLUCOSE METER
KIT MISCELLANEOUS
Qty: 100 EACH | Refills: 3 | Status: SHIPPED | OUTPATIENT
Start: 2023-11-16

## 2023-11-17 DIAGNOSIS — H40.1132 PRIMARY OPEN ANGLE GLAUCOMA (POAG) OF BOTH EYES, MODERATE STAGE: Primary | ICD-10-CM

## 2023-11-17 RX ORDER — BRIMONIDINE TARTRATE 1.5 MG/ML
1 SOLUTION/ DROPS OPHTHALMIC 2 TIMES DAILY
Qty: 10 ML | Refills: 3 | Status: SHIPPED | OUTPATIENT
Start: 2023-11-17 | End: 2023-11-21 | Stop reason: SDUPTHER

## 2023-11-21 DIAGNOSIS — H40.1133 PRIMARY OPEN ANGLE GLAUCOMA (POAG) OF BOTH EYES, SEVERE STAGE: ICD-10-CM

## 2023-11-21 DIAGNOSIS — H40.233 INTERMITTENT PRIMARY ANGLE-CLOSURE GLAUCOMA OF BOTH EYES: ICD-10-CM

## 2023-11-21 RX ORDER — BRIMONIDINE TARTRATE 2 MG/ML
1 SOLUTION/ DROPS OPHTHALMIC 2 TIMES DAILY
Qty: 15 ML | Refills: 6 | Status: SHIPPED | OUTPATIENT
Start: 2023-11-21 | End: 2024-11-20

## 2023-11-27 ENCOUNTER — HOSPITAL ENCOUNTER (OUTPATIENT)
Dept: RADIOLOGY | Facility: HOSPITAL | Age: 68
Discharge: HOME | End: 2023-11-27
Payer: MEDICARE

## 2023-11-27 DIAGNOSIS — E78.00 HIGH CHOLESTEROL: ICD-10-CM

## 2023-11-27 PROCEDURE — 75571 CT HRT W/O DYE W/CA TEST: CPT

## 2023-12-15 ENCOUNTER — OFFICE VISIT (OUTPATIENT)
Dept: HEMATOLOGY/ONCOLOGY | Facility: CLINIC | Age: 68
End: 2023-12-15
Payer: MEDICARE

## 2023-12-15 VITALS
HEART RATE: 78 BPM | TEMPERATURE: 96.8 F | HEIGHT: 66 IN | DIASTOLIC BLOOD PRESSURE: 70 MMHG | BODY MASS INDEX: 25.19 KG/M2 | SYSTOLIC BLOOD PRESSURE: 105 MMHG | OXYGEN SATURATION: 97 % | WEIGHT: 156.75 LBS | RESPIRATION RATE: 18 BRPM

## 2023-12-15 DIAGNOSIS — D64.9 ANEMIA, UNSPECIFIED TYPE: ICD-10-CM

## 2023-12-15 LAB
ALBUMIN SERPL BCP-MCNC: 4.3 G/DL (ref 3.4–5)
ALP SERPL-CCNC: 55 U/L (ref 33–136)
ALT SERPL W P-5'-P-CCNC: 16 U/L (ref 10–52)
ANION GAP SERPL CALC-SCNC: 12 MMOL/L (ref 10–20)
AST SERPL W P-5'-P-CCNC: 15 U/L (ref 9–39)
BASOPHILS # BLD AUTO: 0.02 X10*3/UL (ref 0–0.1)
BASOPHILS NFR BLD AUTO: 0.4 %
BILIRUB SERPL-MCNC: 0.4 MG/DL (ref 0–1.2)
BUN SERPL-MCNC: 21 MG/DL (ref 6–23)
CALCIUM SERPL-MCNC: 11.1 MG/DL (ref 8.6–10.3)
CHLORIDE SERPL-SCNC: 100 MMOL/L (ref 98–107)
CMV IGG AVIDITY SERPL IA-RTO: NONREACTIVE %
CO2 SERPL-SCNC: 31 MMOL/L (ref 21–32)
CREAT SERPL-MCNC: 1.13 MG/DL (ref 0.5–1.3)
EBV EA IGG SER QL: NEGATIVE
EBV NA AB SER QL: POSITIVE
EBV VCA IGG SER IA-ACNC: POSITIVE
EBV VCA IGM SER IA-ACNC: ABNORMAL
EOSINOPHIL # BLD AUTO: 0.07 X10*3/UL (ref 0–0.7)
EOSINOPHIL NFR BLD AUTO: 1.4 %
ERYTHROCYTE [DISTWIDTH] IN BLOOD BY AUTOMATED COUNT: 13.5 % (ref 11.5–14.5)
FERRITIN SERPL-MCNC: 65 NG/ML (ref 20–300)
GFR SERPL CREATININE-BSD FRML MDRD: 71 ML/MIN/1.73M*2
GLUCOSE SERPL-MCNC: 228 MG/DL (ref 74–99)
HCT VFR BLD AUTO: 35.5 % (ref 41–52)
HGB BLD-MCNC: 11.7 G/DL (ref 13.5–17.5)
HGB RETIC QN: 37 PG (ref 28–38)
HIV 1+2 AB+HIV1 P24 AG SERPL QL IA: NONREACTIVE
IGA SERPL-MCNC: 264 MG/DL (ref 70–400)
IGG SERPL-MCNC: 1200 MG/DL (ref 700–1600)
IGM SERPL-MCNC: 60 MG/DL (ref 40–230)
IMM GRANULOCYTES # BLD AUTO: 0.01 X10*3/UL (ref 0–0.7)
IMM GRANULOCYTES NFR BLD AUTO: 0.2 % (ref 0–0.9)
IMMATURE RETIC FRACTION: 5.5 %
IRON SATN MFR SERPL: 31 % (ref 25–45)
IRON SERPL-MCNC: 102 UG/DL (ref 35–150)
LDH SERPL L TO P-CCNC: 106 U/L (ref 84–246)
LYMPHOCYTES # BLD AUTO: 1.44 X10*3/UL (ref 1.2–4.8)
LYMPHOCYTES NFR BLD AUTO: 28.5 %
MCH RBC QN AUTO: 32.6 PG (ref 26–34)
MCHC RBC AUTO-ENTMCNC: 33 G/DL (ref 32–36)
MCV RBC AUTO: 99 FL (ref 80–100)
MONOCYTES # BLD AUTO: 0.58 X10*3/UL (ref 0.1–1)
MONOCYTES NFR BLD AUTO: 11.5 %
NEUTROPHILS # BLD AUTO: 2.93 X10*3/UL (ref 1.2–7.7)
NEUTROPHILS NFR BLD AUTO: 58 %
NRBC BLD-RTO: 0 /100 WBCS (ref 0–0)
PLATELET # BLD AUTO: 195 X10*3/UL (ref 150–450)
POTASSIUM SERPL-SCNC: 4.5 MMOL/L (ref 3.5–5.3)
PROT SERPL-MCNC: 7.4 G/DL (ref 6.4–8.2)
PROT SERPL-MCNC: 7.4 G/DL (ref 6.4–8.2)
RBC # BLD AUTO: 3.59 X10*6/UL (ref 4.5–5.9)
RETICS #: 0.03 X10*6/UL (ref 0.02–0.11)
RETICS/RBC NFR AUTO: 1 % (ref 0.5–2)
SODIUM SERPL-SCNC: 138 MMOL/L (ref 136–145)
TESTOST SERPL-MCNC: 395 NG/DL (ref 240–1000)
TIBC SERPL-MCNC: 331 UG/DL (ref 240–445)
TSH SERPL-ACNC: 1.83 MIU/L (ref 0.44–3.98)
UIBC SERPL-MCNC: 229 UG/DL (ref 110–370)
WBC # BLD AUTO: 5.1 X10*3/UL (ref 4.4–11.3)

## 2023-12-15 PROCEDURE — G0452 MOLECULAR PATHOLOGY INTERPR: HCPCS | Performed by: NURSE PRACTITIONER

## 2023-12-15 PROCEDURE — 84155 ASSAY OF PROTEIN SERUM: CPT | Mod: 59 | Performed by: NURSE PRACTITIONER

## 2023-12-15 PROCEDURE — 86320 SERUM IMMUNOELECTROPHORESIS: CPT | Performed by: NURSE PRACTITIONER

## 2023-12-15 PROCEDURE — 86645 CMV ANTIBODY IGM: CPT | Performed by: NURSE PRACTITIONER

## 2023-12-15 PROCEDURE — 3044F HG A1C LEVEL LT 7.0%: CPT | Performed by: NURSE PRACTITIONER

## 2023-12-15 PROCEDURE — 84443 ASSAY THYROID STIM HORMONE: CPT | Performed by: NURSE PRACTITIONER

## 2023-12-15 PROCEDURE — 3078F DIAST BP <80 MM HG: CPT | Performed by: NURSE PRACTITIONER

## 2023-12-15 PROCEDURE — 87389 HIV-1 AG W/HIV-1&-2 AB AG IA: CPT | Performed by: NURSE PRACTITIONER

## 2023-12-15 PROCEDURE — 3074F SYST BP LT 130 MM HG: CPT | Performed by: NURSE PRACTITIONER

## 2023-12-15 PROCEDURE — 82728 ASSAY OF FERRITIN: CPT | Performed by: NURSE PRACTITIONER

## 2023-12-15 PROCEDURE — 85045 AUTOMATED RETICULOCYTE COUNT: CPT | Performed by: NURSE PRACTITIONER

## 2023-12-15 PROCEDURE — 86665 EPSTEIN-BARR CAPSID VCA: CPT | Performed by: NURSE PRACTITIONER

## 2023-12-15 PROCEDURE — 4010F ACE/ARB THERAPY RXD/TAKEN: CPT | Performed by: NURSE PRACTITIONER

## 2023-12-15 PROCEDURE — 83540 ASSAY OF IRON: CPT | Performed by: NURSE PRACTITIONER

## 2023-12-15 PROCEDURE — 83550 IRON BINDING TEST: CPT | Performed by: NURSE PRACTITIONER

## 2023-12-15 PROCEDURE — 36415 COLL VENOUS BLD VENIPUNCTURE: CPT | Performed by: NURSE PRACTITIONER

## 2023-12-15 PROCEDURE — 1159F MED LIST DOCD IN RCRD: CPT | Performed by: NURSE PRACTITIONER

## 2023-12-15 PROCEDURE — 84403 ASSAY OF TOTAL TESTOSTERONE: CPT | Performed by: NURSE PRACTITIONER

## 2023-12-15 PROCEDURE — 85025 COMPLETE CBC W/AUTO DIFF WBC: CPT | Performed by: NURSE PRACTITIONER

## 2023-12-15 PROCEDURE — 1126F AMNT PAIN NOTED NONE PRSNT: CPT | Performed by: NURSE PRACTITIONER

## 2023-12-15 PROCEDURE — 99215 OFFICE O/P EST HI 40 MIN: CPT | Performed by: NURSE PRACTITIONER

## 2023-12-15 PROCEDURE — 86334 IMMUNOFIX E-PHORESIS SERUM: CPT | Performed by: NURSE PRACTITIONER

## 2023-12-15 PROCEDURE — 99205 OFFICE O/P NEW HI 60 MIN: CPT | Performed by: NURSE PRACTITIONER

## 2023-12-15 PROCEDURE — 86644 CMV ANTIBODY: CPT | Performed by: NURSE PRACTITIONER

## 2023-12-15 PROCEDURE — 83615 LACTATE (LD) (LDH) ENZYME: CPT | Performed by: NURSE PRACTITIONER

## 2023-12-15 PROCEDURE — 3048F LDL-C <100 MG/DL: CPT | Performed by: NURSE PRACTITIONER

## 2023-12-15 PROCEDURE — 81450 HL NEO GSAP 5-50DNA/DNA&RNA: CPT | Performed by: NURSE PRACTITIONER

## 2023-12-15 PROCEDURE — 83521 IG LIGHT CHAINS FREE EACH: CPT | Performed by: NURSE PRACTITIONER

## 2023-12-15 PROCEDURE — 82784 ASSAY IGA/IGD/IGG/IGM EACH: CPT | Performed by: NURSE PRACTITIONER

## 2023-12-15 PROCEDURE — 84165 PROTEIN E-PHORESIS SERUM: CPT | Performed by: NURSE PRACTITIONER

## 2023-12-15 PROCEDURE — 80053 COMPREHEN METABOLIC PANEL: CPT | Performed by: NURSE PRACTITIONER

## 2023-12-15 ASSESSMENT — ENCOUNTER SYMPTOMS
DEPRESSION: 0
OCCASIONAL FEELINGS OF UNSTEADINESS: 1
LOSS OF SENSATION IN FEET: 0

## 2023-12-15 ASSESSMENT — COLUMBIA-SUICIDE SEVERITY RATING SCALE - C-SSRS
1. IN THE PAST MONTH, HAVE YOU WISHED YOU WERE DEAD OR WISHED YOU COULD GO TO SLEEP AND NOT WAKE UP?: NO
6. HAVE YOU EVER DONE ANYTHING, STARTED TO DO ANYTHING, OR PREPARED TO DO ANYTHING TO END YOUR LIFE?: NO
2. HAVE YOU ACTUALLY HAD ANY THOUGHTS OF KILLING YOURSELF?: NO

## 2023-12-15 ASSESSMENT — PATIENT HEALTH QUESTIONNAIRE - PHQ9
2. FEELING DOWN, DEPRESSED OR HOPELESS: NOT AT ALL
1. LITTLE INTEREST OR PLEASURE IN DOING THINGS: NOT AT ALL
SUM OF ALL RESPONSES TO PHQ9 QUESTIONS 1 AND 2: 0

## 2023-12-15 ASSESSMENT — PAIN SCALES - GENERAL: PAINLEVEL: 0-NO PAIN

## 2023-12-15 NOTE — PROGRESS NOTES
"Patient ID: Benjie Waters is a 68 y.o. male.  Referring Physician: Meseret De La Torre PA-C  [address] [place] [facility]  [address]  [place] [address]  Primary Care Provider: Meseret De La Torre PA-C  Visit Type: Initial Visit      Subjective    HPI  Mr Waters is mentally disabled 69yo who presents with his sister, Jesica Justice, for evaluation of anemia.  Labs done 10/4/23 showed WBC 5.3, hgb 11.6 nand plt 222.  There was noted macrocytosis with  with normal B12 549 and monocytosis 11.7%.  CMP was normal with exception of hypercalcemia of 11.    She denies weight loss, fevers, night sweats  or recurrent infections.   He is heavy smoker and CT scan showed some abnormality that they need to follow up on.  No reported cough, congestion or shortness of breath.   He had colonoscopy 2021 reportedly normal and sister reports diarrhea alternating with constipation, but no melena or hematochezia.  Iron levels were good in 2021 and HCV negative 8/7/21.        Review of Systems - Oncology  as above  He is blind    Objective   BSA: 1.81 meters squared  /70 (BP Location: Right arm, Patient Position: Sitting, BP Cuff Size: Adult long)   Pulse 78   Temp 36 °C (96.8 °F) (Temporal)   Resp 18   Ht 1.666 m (5' 5.59\")   Wt 71.1 kg (156 lb 12 oz)   SpO2 97%   BMI 25.62 kg/m²      has a past medical history of Acute atopic conjunctivitis, unspecified eye (12/27/2017), Acute atopic conjunctivitis, unspecified eye (12/27/2017), Aphakia, unspecified eye (10/04/2022), Disorder of lipoprotein metabolism, unspecified, Dry eye syndrome of bilateral lacrimal glands (12/27/2017), Dry eye syndrome of bilateral lacrimal glands (12/27/2017), Encounter for other general examination, Essential (primary) hypertension (07/27/2013), Ischemic optic neuropathy, unspecified eye (10/04/2022), Other specified diabetes mellitus without complications (CMS/HCC), Personal history of other diseases of the nervous system and " sense organs, Personal history of other diseases of the respiratory system, Personal history of other infectious and parasitic diseases, Primary open-angle glaucoma, unspecified eye, severe stage (2017), and Tinea unguium.   has a past surgical history that includes Eye surgery (07/15/2014) and Other surgical history (2021).  Family History   Problem Relation Name Age of Onset    No Known Problems Mother     Father  of leukemia      Benjie Waters  reports that he has been smoking cigarettes. He has a 50.00 pack-year smoking history. He has been exposed to tobacco smoke. He has never used smokeless tobacco.  He  reports that he does not currently use alcohol.  He  reports no history of drug use.  Here with his sister Jesica Justice [phone] who is his caregiver    Physical Exam  Constitutional:       Comments: He smells strongly of tobacco.   Blind, mentally disabled, follows instructions and can get on exam table, but does not communicate with provider.    Eyes:      Comments: Blind   Cardiovascular:      Rate and Rhythm: Normal rate and regular rhythm.      Pulses: Normal pulses.      Heart sounds: Normal heart sounds. No murmur heard.  Pulmonary:      Effort: Pulmonary effort is normal. No respiratory distress.      Breath sounds: Normal breath sounds.   Abdominal:      Tenderness: There is no abdominal tenderness. There is guarding.      Comments: No HSM, fullness LUQ   Musculoskeletal:         General: No swelling or tenderness.   Lymphadenopathy:      Cervical: No cervical adenopathy.   Skin:     Coloration: Skin is not jaundiced.      Findings: No bruising or lesion.   Neurological:      General: No focal deficit present.      Motor: No weakness.     WBC   Date/Time Value Ref Range Status   10/04/2023 09:19 AM 5.3 4.4 - 11.3 x10*3/uL Final   08/10/2022 09:25 AM 5.8 4.4 - 11.3 x10E9/L Final   2021 09:29 AM 5.0 4.5 - 11.0 K/UL Final   2020 09:55 AM 5.6 4.5 - 11.0 K/UL Final      nRBC   Date Value Ref Range Status   10/04/2023 0.0 0.0 - 0.0 /100 WBCs Final   08/10/2022 0.0 0.0 - 0.0 /100 WBC Final   08/07/2021 0 0 /100 WBC Final   06/03/2020 0 0 /100 WBC Final     RBC   Date Value Ref Range Status   10/04/2023 3.55 (L) 4.50 - 5.90 x10*6/uL Final   08/10/2022 3.78 (L) 4.50 - 5.90 x10E12/L Final   08/07/2021 3.84 (L) 4.5 - 5.5 M/UL Final   06/03/2020 3.88 (L) 4.5 - 5.5 M/UL Final     Hemoglobin   Date Value Ref Range Status   10/04/2023 11.6 (L) 13.5 - 17.5 g/dL Final   08/10/2022 12.5 (L) 13.5 - 17.5 g/dL Final   08/07/2021 12.7 (L) 13.5 - 16.5 GM/DL Final   06/03/2020 12.5 (L) 13.5 - 16.5 GM/DL Final     Hematocrit   Date Value Ref Range Status   10/04/2023 36.4 (L) 41.0 - 52.0 % Final   08/10/2022 38.1 (L) 41.0 - 52.0 % Final   08/07/2021 38.4 (L) 41 - 50 % Final   06/03/2020 39.7 (L) 41 - 50 % Final     MCV   Date/Time Value Ref Range Status   10/04/2023 09:19  (H) 80 - 100 fL Final   08/10/2022 09:25  (H) 80 - 100 fL Final   08/07/2021 09:29 .0 80 - 100 FL Final   06/03/2020 09:55 .3 (H) 80 - 100 FL Final     MCH   Date/Time Value Ref Range Status   10/04/2023 09:19 AM 32.7 26.0 - 34.0 pg Final   08/07/2021 09:29 AM 33.1 26 - 34 PG Final   06/03/2020 09:55 AM 32.2 26 - 34 PG Final     MCHC   Date/Time Value Ref Range Status   10/04/2023 09:19 AM 31.9 (L) 32.0 - 36.0 g/dL Final   08/10/2022 09:25 AM 32.8 32.0 - 36.0 g/dL Final   08/07/2021 09:29 AM 33.1 31 - 37 % Final   06/03/2020 09:55 AM 31.5 31 - 37 % Final     RDW   Date/Time Value Ref Range Status   10/04/2023 09:19 AM 13.7 11.5 - 14.5 % Final   08/10/2022 09:25 AM 13.8 11.5 - 14.5 % Final     Platelets   Date/Time Value Ref Range Status   10/04/2023 09:19  150 - 450 x10*3/uL Final   08/10/2022 09:25  150 - 450 x10E9/L Final   08/07/2021 09:29  150 - 450 K/UL Final   06/03/2020 09:55  150 - 450 K/UL Final     MPV   Date/Time Value Ref Range Status   10/04/2023 09:19 AM 11.4 7.5 -  11.5 fL Final   08/07/2021 09:29 AM 10.8 7.0 - 12.6 CU Final   06/03/2020 09:55 AM 11.0 7.0 - 12.6 CU Final     Neutrophils %   Date/Time Value Ref Range Status   10/04/2023 09:19 AM 52.2 40.0 - 80.0 % Final   08/10/2022 09:25 AM 57.8 40.0 - 80.0 % Final     Immature Granulocytes %, Automated   Date/Time Value Ref Range Status   10/04/2023 09:19 AM 0.2 0.0 - 0.9 % Final     Comment:     Immature Granulocyte Count (IG) includes promyelocytes, myelocytes and metamyelocytes but does not include bands. Percent differential counts (%) should be interpreted in the context of the absolute cell counts (cells/UL).   08/10/2022 09:25 AM 0.3 0.0 - 0.9 % Final     Comment:      Immature Granulocyte Count (IG) includes promyelocytes,    myelocytes and metamyelocytes but does not include bands.   Percent differential counts (%) should be interpreted in the   context of the absolute cell counts (cells/L).       Lymphocytes %   Date/Time Value Ref Range Status   10/04/2023 09:19 AM 33.3 13.0 - 44.0 % Final   08/10/2022 09:25 AM 26.8 13.0 - 44.0 % Final   08/07/2021 09:29 AM 32.00 20 - 40 % Final   06/03/2020 09:55 AM 26.10 20 - 40 % Final   06/07/2019 09:28 AM 28.20 20 - 40 % Final     Monocytes %   Date/Time Value Ref Range Status   10/04/2023 09:19 AM 13.7 2.0 - 10.0 % Final   08/10/2022 09:25 AM 12.4 2.0 - 10.0 % Final   08/07/2021 09:29 AM 11.70 (H) 0 - 8 % Final   06/03/2020 09:55 AM 12.10 (H) 0 - 8 % Final   06/07/2019 09:28 AM 10.20 (H) 0 - 8 % Final     Eosinophils %   Date/Time Value Ref Range Status   10/04/2023 09:19 AM 0.0 0.0 - 6.0 % Final   08/10/2022 09:25 AM 2.2 0.0 - 6.0 % Final     Basophils %   Date/Time Value Ref Range Status   10/04/2023 09:19 AM 0.6 0.0 - 2.0 % Final   08/10/2022 09:25 AM 0.5 0.0 - 2.0 % Final   08/07/2021 09:29 AM 0.40 0 - 1 % Final   06/03/2020 09:55 AM 0.50 0 - 1 % Final     Neutrophils Absolute   Date/Time Value Ref Range Status   10/04/2023 09:19 AM 2.75 1.20 - 7.70 x10*3/uL Final      Comment:     Percent differential counts (%) should be interpreted in the context of the absolute cell counts (cells/uL).   08/10/2022 09:25 AM 3.34 1.20 - 7.70 x10E9/L Final   08/07/2021 09:29 AM 2.77 1.8 - 7.7 K/UL Final   06/03/2020 09:55 AM 3.42 1.8 - 7.7 K/UL Final     Immature Granulocytes Absolute, Automated   Date/Time Value Ref Range Status   10/04/2023 09:19 AM 0.01 0.00 - 0.70 x10*3/uL Final   08/07/2021 09:29 AM 0.01 0.0 - 0.1 K/UL Final   06/03/2020 09:55 AM 0.01 0.0 - 0.1 K/UL Final   06/07/2019 09:28 AM 0.01 0.0 - 0.1 K/UL Final     Lymphocytes Absolute   Date/Time Value Ref Range Status   10/04/2023 09:19 AM 1.75 1.20 - 4.80 x10*3/uL Final   08/10/2022 09:25 AM 1.55 1.20 - 4.80 x10E9/L Final   08/07/2021 09:29 AM 1.59 1.2 - 3.2 K/UL Final   06/03/2020 09:55 AM 1.46 1.2 - 3.2 K/UL Final     Monocytes Absolute   Date/Time Value Ref Range Status   10/04/2023 09:19 AM 0.72 0.10 - 1.00 x10*3/uL Final   08/10/2022 09:25 AM 0.72 0.10 - 1.00 x10E9/L Final   08/07/2021 09:29 AM 0.58 0 - 0.8 K/UL Final   06/03/2020 09:55 AM 0.68 0 - 0.8 K/UL Final     Eosinophils Absolute   Date/Time Value Ref Range Status   10/04/2023 09:19 AM 0.00 0.00 - 0.70 x10*3/uL Final   08/10/2022 09:25 AM 0.13 0.00 - 0.70 x10E9/L Final   08/07/2021 09:29 AM 0.00 0 - 0.45 K/UL Final   06/03/2020 09:55 AM 0.00 0 - 0.45 K/UL Final     Basophils Absolute   Date/Time Value Ref Range Status   10/04/2023 09:19 AM 0.03 0.00 - 0.10 x10*3/uL Final   08/10/2022 09:25 AM 0.03 0.00 - 0.10 x10E9/L Final   08/07/2021 09:29 AM 0.02 0.00 - 0.22 K/UL Final   06/03/2020 09:55 AM 0.03 0.00 - 0.22 K/UL Final           Assessment/Plan    Anemia with macrocytosis and monocytosis in asymptomatic mentally disabled, blind man with normal B12 in Oct 2023 and normal iron and negative HCV in 2021.  Colonoscopy reportedly normal in 2021, but currently with GI symptoms of diarrhea alternating with constipation and fullness LUQ noted on exam.  He was noted to have  hypercalcemia of 11 10/4/23.  He denies specific bone or joint pain.    Will check peripheral blood for flow and malignant heme panel.  Recheck iron levels.  Will check SPEP, immunoglobulin and light chains.  Will check HIV, EBV and CMV  Will call sister with results and discuss follow up and management at that time.     Heavy tobacco abuse with abnormal CT, needs to follow up with PCP to schedule repeat and ask about seeing cardiologist to evaluate calcifications.       Diagnoses and all orders for this visit:  Anemia, unspecified type  -     Referral to Hematology and Oncology  -     CBC and Auto Differential; Future  -     Comprehensive Metabolic Panel; Future  -     Ferritin; Future  -     Iron and TIBC; Future  -     Flow Cytometry Test  -     Immunoglobulins (IgG, IgA, IgM); Future  -     Jessup/Lambda Free Light Chain, Serum; Future  -     Serum Protein Electrophoresis + Immunofixation; Future  -     TSH with reflex to Free T4 if abnormal; Future  -     HIV 1/2 Antigen/Antibody Screen with Reflex to Confirmation; Future  -     CMV IgG, IgM; Future  -     Kourtney-Barr Virus Antibody Panel; Future  -     Lactate Dehydrogenase; Future  -     Reticulocytes; Future  -     Testosterone; Future  -     Myeloid Malignancies Panel; Future           Meseret Hope PA-C

## 2023-12-17 LAB
CMV IGM SERPL-ACNC: <8 AU/ML
EBV VCA IGM SER-ACNC: 18.5 U/ML (ref 0–43.9)
KAPPA LC SERPL-MCNC: 5.67 MG/DL (ref 0.33–1.94)
KAPPA LC/LAMBDA SER: 1.87 {RATIO} (ref 0.26–1.65)
LAMBDA LC SERPL-MCNC: 3.04 MG/DL (ref 0.57–2.63)

## 2023-12-19 LAB
ALBUMIN: 4.2 G/DL (ref 3.4–5)
ALPHA 1 GLOBULIN: 0.3 G/DL (ref 0.2–0.6)
ALPHA 2 GLOBULIN: 0.9 G/DL (ref 0.4–1.1)
BETA GLOBULIN: 0.9 G/DL (ref 0.5–1.2)
GAMMA GLOBULIN: 1.1 G/DL (ref 0.5–1.4)
IMMUNOFIXATION COMMENT: NORMAL
PATH REVIEW - SERUM IMMUNOFIXATION: NORMAL
PATH REVIEW-SERUM PROTEIN ELECTROPHORESIS: NORMAL
PROTEIN ELECTROPHORESIS COMMENT: NORMAL

## 2023-12-20 LAB
ELECTRONICALLY SIGNED BY: NORMAL
MYELOID NGS RESULTS: NORMAL

## 2023-12-22 ENCOUNTER — TELEPHONE (OUTPATIENT)
Dept: HEMATOLOGY/ONCOLOGY | Facility: CLINIC | Age: 68
End: 2023-12-22
Payer: MEDICARE

## 2023-12-26 DIAGNOSIS — E83.52 HYPERCALCEMIA: Primary | ICD-10-CM

## 2023-12-26 NOTE — PROGRESS NOTES
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  Wants to know what this has to do with herpes.  Kourtney Franco virus is in herpes family like chicken pox virus....\"What is Kourtney Barr virus?\".   Answered questions to understanding.   Will recheck labs in 3 months and particularly watch calcium and SPEP.  "

## 2023-12-27 ENCOUNTER — TELEPHONE (OUTPATIENT)
Dept: PRIMARY CARE | Facility: CLINIC | Age: 68
End: 2023-12-27
Payer: MEDICARE

## 2023-12-27 DIAGNOSIS — E11.9 DIABETES MELLITUS TYPE 2 WITHOUT RETINOPATHY (MULTI): ICD-10-CM

## 2023-12-27 RX ORDER — METFORMIN HYDROCHLORIDE 500 MG/1
1000 TABLET, EXTENDED RELEASE ORAL 2 TIMES DAILY
Qty: 360 TABLET | Refills: 0 | Status: SHIPPED | OUTPATIENT
Start: 2023-12-27 | End: 2024-03-18 | Stop reason: SDUPTHER

## 2024-01-16 ENCOUNTER — OFFICE VISIT (OUTPATIENT)
Dept: OTOLARYNGOLOGY | Facility: CLINIC | Age: 69
End: 2024-01-16
Payer: MEDICARE

## 2024-01-16 VITALS — HEIGHT: 66 IN | BODY MASS INDEX: 25.71 KG/M2 | WEIGHT: 160 LBS

## 2024-01-16 DIAGNOSIS — H69.93 DYSFUNCTION OF BOTH EUSTACHIAN TUBES: Primary | ICD-10-CM

## 2024-01-16 DIAGNOSIS — H90.6 MIXED CONDUCTIVE AND SENSORINEURAL HEARING LOSS OF BOTH EARS: ICD-10-CM

## 2024-01-16 DIAGNOSIS — F79 INTELLECTUAL DISABILITY: ICD-10-CM

## 2024-01-16 PROCEDURE — 1126F AMNT PAIN NOTED NONE PRSNT: CPT | Performed by: OTOLARYNGOLOGY

## 2024-01-16 PROCEDURE — 1159F MED LIST DOCD IN RCRD: CPT | Performed by: OTOLARYNGOLOGY

## 2024-01-16 PROCEDURE — 4010F ACE/ARB THERAPY RXD/TAKEN: CPT | Performed by: OTOLARYNGOLOGY

## 2024-01-16 PROCEDURE — 99213 OFFICE O/P EST LOW 20 MIN: CPT | Performed by: OTOLARYNGOLOGY

## 2024-01-16 ASSESSMENT — ENCOUNTER SYMPTOMS
OCCASIONAL FEELINGS OF UNSTEADINESS: 1
LOSS OF SENSATION IN FEET: 0
DEPRESSION: 0

## 2024-01-16 NOTE — PROGRESS NOTES
Chief Complaint   Patient presents with    Follow-up     3 Month follow up- ear check / LOV 1//2023      HPI  He is here for every 3 month ear cleaning  Benije Waters is a 68 y.o. male with recurrent cerumen impactions. He is s/p left tube placed March 23, 2022 and a right perforation. 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   History:  with severe MR. He is status post left myringotomy that has healed and right tympanic membrane perforation. He has a chronic eustachian tube dysfunction bilaterally. He smokes. He is using Allegra for his allergies      Past Medical History:   Diagnosis Date    Acute atopic conjunctivitis, unspecified eye 12/27/2017    Acute allergic conjunctivitis    Acute atopic conjunctivitis, unspecified eye 12/27/2017    Acute allergic conjunctivitis    Aphakia, unspecified eye 10/04/2022    Aphakia    Disorder of lipoprotein metabolism, unspecified     Abnormal cholesterol test    Dry eye syndrome of bilateral lacrimal glands 12/27/2017    Dry eye syndrome, bilateral    Dry eye syndrome of bilateral lacrimal glands 12/27/2017    Dry eye syndrome, bilateral    Encounter for other general examination     Podiatry visit, routine    Essential (primary) hypertension 07/27/2013    Hypertension    Ischemic optic neuropathy, unspecified eye 10/04/2022    AION (anterior ischemic optic neuropathy)    Other specified diabetes mellitus without complications (CMS/HCC)     Diabetes mellitus of other type without complication    Personal history of other diseases of the nervous system and sense organs     History of obstructive sleep apnea    Personal history of other diseases of the respiratory system     History of chronic obstructive lung disease    Personal history of other infectious and parasitic diseases     History of athlete's foot    Primary open-angle glaucoma, unspecified eye, severe stage 12/27/2017    Chronic glaucoma, severe stage    Tinea unguium     Mycotic  toenails            Medications:     Current Outpatient Medications:     artificial tears, dextran-hypomel-glycerin, 0.1-0.3-0.2 % ophthalmic solution, Administer 1 drop into affected eye(s) 4 times a day., Disp: , Rfl:     ascorbic acid (Vitamin C) 1,000 mg tablet, Take 1 tablet (1,000 mg) by mouth once daily., Disp: , Rfl:     atorvastatin (Lipitor) 40 mg tablet, Take 1 tablet (40 mg) by mouth once daily., Disp: , Rfl:     blood sugar diagnostic (FreeStyle Lite Strips) strip, Use once daily and as needed, Disp: 100 each, Rfl: 3    brimonidine (AlphaGAN) 0.2 % ophthalmic solution, Administer 1 drop into both eyes 2 times a day., Disp: 15 mL, Rfl: 6    fexofenadine (Allegra Allergy) 180 mg tablet, Take 1 tablet (180 mg) by mouth. TAKE PER DIRECTED, Disp: , Rfl:     flaxseed oiL 1,000 mg capsule, Take by mouth. TAKE PER DIECTED, Disp: , Rfl:     glimepiride (Amaryl) 4 mg tablet, Take 1 tablet (4 mg) by mouth once daily., Disp: , Rfl:     losartan (Cozaar) 25 mg tablet, Take 1 tablet (25 mg) by mouth once daily., Disp: , Rfl:     metFORMIN  mg 24 hr tablet, Take 2 tablets (1,000 mg) by mouth 2 times a day., Disp: 360 tablet, Rfl: 0    montelukast (Singulair) 10 mg tablet, Take 1 tablet (10 mg) by mouth once daily., Disp: , Rfl:     olopatadine (Pataday) 0.2 % ophthalmic solution, Administer 1 drop into affected eye(s) once daily. PER DIRECTED, Disp: , Rfl:     pantoprazole (ProtoNix) 40 mg EC tablet, Take 1 tablet (40 mg) by mouth once daily., Disp: 90 tablet, Rfl: 0    Tobradex ophthalmic ointment, DO PER DIRECTD, Disp: , Rfl:     GLIMEPIRIDE ORAL, Take by mouth. PER DIRECTED, Disp: , Rfl:     ketorolac (Acular) 0.5 % ophthalmic solution, 1 drop 4 times a day. OPERATIVE EYE, Disp: , Rfl:     multivit-min/FA/lycopen/lutein (COMPLETE MULTI 50+ ORAL), Take 1 capsule by mouth once daily., Disp: , Rfl:     nitrofurantoin (Macrodantin) 100 mg capsule, Take 1 capsule (100 mg) by mouth. TAKE PER DIRECTED, Disp: , Rfl:  "    ofloxacin (Ocuflox) 0.3 % ophthalmic solution, DO PER DIRECTED, Disp: , Rfl:     prednisoLONE acetate (Pred-Forte) 1 % ophthalmic suspension, 1 drop 4 times a day. AFFECTED EYE, Disp: , Rfl:     vit A/vit C/vit E/zinc/copper (VITAMINS A,C,E-ZINC-COPPER ORAL), Take by mouth. TAKE PER DIRECTED, Disp: , Rfl:      Allergies:  Allergies   Allergen Reactions    Bacitracin Unknown    Erythromycin Unknown        Physical Exam:  Last Recorded Vitals  Height 1.664 m (5' 5.5\"), weight 72.6 kg (160 lb).  []General appearance: Well-developed, well-nourished in no acute distress, conversant with normal voice quality    Head/face: No erythema or edema or facial tenderness, and normal facial nerve function bilaterally    External ear: Clear external auditory canals with normal pinnae, bilateral cerumen impactions removed  Tube status: Left T-tube in place  Middle ear: Large right tympanic membrane perforation.  Left middle ear mucosa looks normal  Tympanic membrane perforation: N/A  Mastoid bowl: N/A  Hearing: Normal conversational awareness at normal speech thresholds    Nose visualized using: Anterior rhinoscopy  Nasal dorsum: Nontraumatic midline appearance  Septum: Midline, nonobstructing  Inferior turbinates: Normal, pink  Secretions: Dry    Oral cavity and oropharynx: Normal  Teeth: Good condition  Floor of mouth: without lesions  Palate: Normal hard palate, soft palate and uvula  Oropharynx: Clear, no lesions present  Buccal mucosa: Normal without masses or lesions  Lips: Normal    Nasopharynx: Inadequate mirror exam secondary to gag/anatomy    Neck:  Salivary glands: Normal bilateral parotid and submandibular glands by inspection and palpation.  Non-thyroid masses: No palpable masses or significant lymphadenopathy  Trachea: Midline  Thyroid: No thyromegaly or palpable nodules  Temporomandibular joint: Nontender  Cervical range of motion: Normal    Neurologic exam: Alert and oriented x3, appropriate affect.  Cranial " nerves II-XII normal bilaterally  Extraocular movement: Extraocular movement intact, normal gaze alignment    Assessment/Plan   Encounter Diagnoses   Name Primary?    Dysfunction of both eustachian tubes Yes    Intellectual disability     Mixed conductive and sensorineural hearing loss of both ears          Recheck in 3 months.  Tube in good position.    Lupillo Weinstein MD

## 2024-01-20 DIAGNOSIS — K21.9 CHRONIC GERD: ICD-10-CM

## 2024-01-22 RX ORDER — PANTOPRAZOLE SODIUM 40 MG/1
40 TABLET, DELAYED RELEASE ORAL DAILY
Qty: 90 TABLET | Refills: 0 | Status: SHIPPED | OUTPATIENT
Start: 2024-01-22 | End: 2024-05-02 | Stop reason: SDUPTHER

## 2024-02-19 ENCOUNTER — HOSPITAL ENCOUNTER (OUTPATIENT)
Dept: RADIOLOGY | Facility: EXTERNAL LOCATION | Age: 69
Discharge: HOME | End: 2024-02-19

## 2024-02-19 DIAGNOSIS — M54.50 LOW BACK PAIN, UNSPECIFIED BACK PAIN LATERALITY, UNSPECIFIED CHRONICITY, UNSPECIFIED WHETHER SCIATICA PRESENT: ICD-10-CM

## 2024-02-29 ENCOUNTER — TELEPHONE (OUTPATIENT)
Dept: PRIMARY CARE | Facility: CLINIC | Age: 69
End: 2024-02-29
Payer: MEDICARE

## 2024-02-29 DIAGNOSIS — I10 BENIGN ESSENTIAL HTN: ICD-10-CM

## 2024-02-29 DIAGNOSIS — J43.9 PULMONARY EMPHYSEMA, UNSPECIFIED EMPHYSEMA TYPE (MULTI): Primary | ICD-10-CM

## 2024-02-29 RX ORDER — MONTELUKAST SODIUM 10 MG/1
10 TABLET ORAL DAILY
Qty: 90 TABLET | Refills: 0 | Status: SHIPPED | OUTPATIENT
Start: 2024-02-29 | End: 2024-05-28

## 2024-02-29 RX ORDER — LOSARTAN POTASSIUM 25 MG/1
25 TABLET ORAL DAILY
Qty: 90 TABLET | Refills: 0 | Status: SHIPPED | OUTPATIENT
Start: 2024-02-29 | End: 2024-05-29 | Stop reason: SDUPTHER

## 2024-02-29 NOTE — TELEPHONE ENCOUNTER
Patients sister is calling in for refills on Losartan 25 mg tabs and Montelukast 10 mg tabs.  Please send to [facility] on [address].

## 2024-03-14 DIAGNOSIS — R91.1 LUNG NODULE: Primary | ICD-10-CM

## 2024-03-18 ENCOUNTER — OFFICE VISIT (OUTPATIENT)
Dept: PRIMARY CARE | Facility: CLINIC | Age: 69
End: 2024-03-18
Payer: MEDICARE

## 2024-03-18 VITALS
OXYGEN SATURATION: 97 % | DIASTOLIC BLOOD PRESSURE: 70 MMHG | HEART RATE: 79 BPM | WEIGHT: 160 LBS | SYSTOLIC BLOOD PRESSURE: 112 MMHG | BODY MASS INDEX: 26.22 KG/M2

## 2024-03-18 DIAGNOSIS — E11.9 DIABETES MELLITUS TYPE 2 WITHOUT RETINOPATHY (MULTI): ICD-10-CM

## 2024-03-18 DIAGNOSIS — E78.00 HIGH CHOLESTEROL: ICD-10-CM

## 2024-03-18 LAB — POC HEMOGLOBIN A1C: 6.5 % (ref 4.2–6.5)

## 2024-03-18 PROCEDURE — 83036 HEMOGLOBIN GLYCOSYLATED A1C: CPT | Performed by: PHYSICIAN ASSISTANT

## 2024-03-18 PROCEDURE — 4010F ACE/ARB THERAPY RXD/TAKEN: CPT | Performed by: PHYSICIAN ASSISTANT

## 2024-03-18 PROCEDURE — 99214 OFFICE O/P EST MOD 30 MIN: CPT | Performed by: PHYSICIAN ASSISTANT

## 2024-03-18 PROCEDURE — 1126F AMNT PAIN NOTED NONE PRSNT: CPT | Performed by: PHYSICIAN ASSISTANT

## 2024-03-18 PROCEDURE — 1160F RVW MEDS BY RX/DR IN RCRD: CPT | Performed by: PHYSICIAN ASSISTANT

## 2024-03-18 PROCEDURE — 3074F SYST BP LT 130 MM HG: CPT | Performed by: PHYSICIAN ASSISTANT

## 2024-03-18 PROCEDURE — 1157F ADVNC CARE PLAN IN RCRD: CPT | Performed by: PHYSICIAN ASSISTANT

## 2024-03-18 PROCEDURE — 1159F MED LIST DOCD IN RCRD: CPT | Performed by: PHYSICIAN ASSISTANT

## 2024-03-18 PROCEDURE — 4004F PT TOBACCO SCREEN RCVD TLK: CPT | Performed by: PHYSICIAN ASSISTANT

## 2024-03-18 PROCEDURE — 3078F DIAST BP <80 MM HG: CPT | Performed by: PHYSICIAN ASSISTANT

## 2024-03-18 RX ORDER — METFORMIN HYDROCHLORIDE 500 MG/1
1000 TABLET, EXTENDED RELEASE ORAL 2 TIMES DAILY
Qty: 360 TABLET | Refills: 1 | Status: SHIPPED | OUTPATIENT
Start: 2024-03-18 | End: 2024-09-14

## 2024-03-18 ASSESSMENT — ENCOUNTER SYMPTOMS
WOUND: 0
LIGHT-HEADEDNESS: 0
FREQUENCY: 0
COUGH: 1
SHORTNESS OF BREATH: 0
ACTIVITY CHANGE: 0
WEAKNESS: 0
DIZZINESS: 0
CHEST TIGHTNESS: 0
APPETITE CHANGE: 0

## 2024-03-18 ASSESSMENT — PAIN SCALES - GENERAL: PAINLEVEL: 0-NO PAIN

## 2024-03-18 NOTE — PROGRESS NOTES
Subjective   Patient ID: Benjie Waters is a 68 y.o. male who presents for Diabetes (Saw podiatry a couple of weeks ago.) and Hyperlipidemia.    Diabetes  He presents for his follow-up diabetic visit. He has type 2 diabetes mellitus. His disease course has been improving. There are no hypoglycemic associated symptoms. Pertinent negatives for hypoglycemia include no dizziness. Pertinent negatives for diabetes include no chest pain, no foot ulcerations and no weakness. There are no hypoglycemic complications. Symptoms are stable. Risk factors for coronary artery disease include male sex, obesity and tobacco exposure. He sees a podiatrist.Eye exam is current.        Review of Systems   Constitutional:  Negative for activity change and appetite change.   Eyes:  Negative for visual disturbance.   Respiratory:  Positive for cough. Negative for chest tightness and shortness of breath.    Cardiovascular:  Negative for chest pain and leg swelling.   Genitourinary:  Negative for frequency and urgency.   Skin:  Negative for rash and wound.   Neurological:  Negative for dizziness, syncope, weakness and light-headedness.       Objective   /70   Pulse 79   Wt 72.6 kg (160 lb)   SpO2 97%   BMI 26.22 kg/m²     Physical Exam  Cardiovascular:      Rate and Rhythm: Normal rate and regular rhythm.      Pulses: Normal pulses.   Pulmonary:      Effort: Pulmonary effort is normal.   Musculoskeletal:      Right lower leg: No edema.      Left lower leg: No edema.   Neurological:      General: No focal deficit present.      Mental Status: He is alert. Mental status is at baseline.         Assessment/Plan   Diagnoses and all orders for this visit:  Diabetes mellitus type 2 without retinopathy (CMS/[facility], located within [facility])  -     POCT glycosylated hemoglobin (Hb A1C) manually resulted  -     Lipid Panel; Future  -     metFORMIN  mg 24 hr tablet; Take 2 tablets (1,000 mg) by mouth 2 times a day.  High cholesterol  -     Lipid Panel;  Future  Other orders  -     Follow Up In Primary Care - Medicare Annual; Future  Patient still smokes.  He was recently seen in urgent care for some back pain but apparently has not had issues since.  X-ray results were reviewed today.

## 2024-03-20 ENCOUNTER — LAB (OUTPATIENT)
Dept: LAB | Facility: CLINIC | Age: 69
End: 2024-03-20
Payer: MEDICARE

## 2024-03-20 ENCOUNTER — DOCUMENTATION (OUTPATIENT)
Dept: HEMATOLOGY/ONCOLOGY | Facility: CLINIC | Age: 69
End: 2024-03-20
Payer: MEDICARE

## 2024-03-20 DIAGNOSIS — D63.8 ANEMIA OF CHRONIC DISEASE: Primary | ICD-10-CM

## 2024-03-20 DIAGNOSIS — E78.00 HIGH CHOLESTEROL: ICD-10-CM

## 2024-03-20 DIAGNOSIS — E83.52 HYPERCALCEMIA: ICD-10-CM

## 2024-03-20 DIAGNOSIS — E11.9 DIABETES MELLITUS TYPE 2 WITHOUT RETINOPATHY (MULTI): ICD-10-CM

## 2024-03-20 LAB
ALBUMIN SERPL BCP-MCNC: 4.1 G/DL (ref 3.4–5)
ALP SERPL-CCNC: 52 U/L (ref 33–136)
ALT SERPL W P-5'-P-CCNC: 11 U/L (ref 10–52)
ANION GAP SERPL CALC-SCNC: 12 MMOL/L (ref 10–20)
AST SERPL W P-5'-P-CCNC: 13 U/L (ref 9–39)
BASOPHILS # BLD AUTO: 0.02 X10*3/UL (ref 0–0.1)
BASOPHILS NFR BLD AUTO: 0.3 %
BILIRUB SERPL-MCNC: 0.5 MG/DL (ref 0–1.2)
BUN SERPL-MCNC: 25 MG/DL (ref 6–23)
CALCIUM SERPL-MCNC: 10.7 MG/DL (ref 8.6–10.3)
CHLORIDE SERPL-SCNC: 104 MMOL/L (ref 98–107)
CHOLEST SERPL-MCNC: 125 MG/DL (ref 0–199)
CHOLESTEROL/HDL RATIO: 3.1
CO2 SERPL-SCNC: 27 MMOL/L (ref 21–32)
CREAT SERPL-MCNC: 1.13 MG/DL (ref 0.5–1.3)
EGFRCR SERPLBLD CKD-EPI 2021: 71 ML/MIN/1.73M*2
EOSINOPHIL # BLD AUTO: 0 X10*3/UL (ref 0–0.7)
EOSINOPHIL NFR BLD AUTO: 0 %
ERYTHROCYTE [DISTWIDTH] IN BLOOD BY AUTOMATED COUNT: 13.5 % (ref 11.5–14.5)
GLUCOSE SERPL-MCNC: 117 MG/DL (ref 74–99)
HCT VFR BLD AUTO: 34.2 % (ref 41–52)
HDLC SERPL-MCNC: 39.9 MG/DL
HGB BLD-MCNC: 11.4 G/DL (ref 13.5–17.5)
IGA SERPL-MCNC: 273 MG/DL (ref 70–400)
IGG SERPL-MCNC: 1190 MG/DL (ref 700–1600)
IGM SERPL-MCNC: 63 MG/DL (ref 40–230)
IMM GRANULOCYTES # BLD AUTO: 0.03 X10*3/UL (ref 0–0.7)
IMM GRANULOCYTES NFR BLD AUTO: 0.5 % (ref 0–0.9)
LDLC SERPL CALC-MCNC: 69 MG/DL
LYMPHOCYTES # BLD AUTO: 1.57 X10*3/UL (ref 1.2–4.8)
LYMPHOCYTES NFR BLD AUTO: 25.7 %
MCH RBC QN AUTO: 32.5 PG (ref 26–34)
MCHC RBC AUTO-ENTMCNC: 33.3 G/DL (ref 32–36)
MCV RBC AUTO: 97 FL (ref 80–100)
MONOCYTES # BLD AUTO: 0.71 X10*3/UL (ref 0.1–1)
MONOCYTES NFR BLD AUTO: 11.6 %
NEUTROPHILS # BLD AUTO: 3.77 X10*3/UL (ref 1.2–7.7)
NEUTROPHILS NFR BLD AUTO: 61.9 %
NON HDL CHOLESTEROL: 85 MG/DL (ref 0–149)
NRBC BLD-RTO: 0 /100 WBCS (ref 0–0)
PLATELET # BLD AUTO: 185 X10*3/UL (ref 150–450)
POTASSIUM SERPL-SCNC: 4.2 MMOL/L (ref 3.5–5.3)
PROT SERPL-MCNC: 7 G/DL (ref 6.4–8.2)
PROT SERPL-MCNC: 7.1 G/DL (ref 6.4–8.2)
RBC # BLD AUTO: 3.51 X10*6/UL (ref 4.5–5.9)
SODIUM SERPL-SCNC: 139 MMOL/L (ref 136–145)
TRIGL SERPL-MCNC: 80 MG/DL (ref 0–149)
VLDL: 16 MG/DL (ref 0–40)
WBC # BLD AUTO: 6.1 X10*3/UL (ref 4.4–11.3)

## 2024-03-20 PROCEDURE — 85025 COMPLETE CBC W/AUTO DIFF WBC: CPT

## 2024-03-20 PROCEDURE — 84155 ASSAY OF PROTEIN SERUM: CPT | Mod: 59 | Performed by: PHYSICIAN ASSISTANT

## 2024-03-20 PROCEDURE — 36415 COLL VENOUS BLD VENIPUNCTURE: CPT

## 2024-03-20 PROCEDURE — 80053 COMPREHEN METABOLIC PANEL: CPT

## 2024-03-20 PROCEDURE — 83521 IG LIGHT CHAINS FREE EACH: CPT | Performed by: NURSE PRACTITIONER

## 2024-03-20 PROCEDURE — 80061 LIPID PANEL: CPT | Performed by: PHYSICIAN ASSISTANT

## 2024-03-20 PROCEDURE — 82784 ASSAY IGA/IGD/IGG/IGM EACH: CPT | Performed by: NURSE PRACTITIONER

## 2024-03-20 PROCEDURE — 86334 IMMUNOFIX E-PHORESIS SERUM: CPT | Performed by: NURSE PRACTITIONER

## 2024-03-20 PROCEDURE — 86320 SERUM IMMUNOELECTROPHORESIS: CPT | Performed by: NURSE PRACTITIONER

## 2024-03-20 PROCEDURE — 84165 PROTEIN E-PHORESIS SERUM: CPT | Performed by: NURSE PRACTITIONER

## 2024-03-20 NOTE — PROGRESS NOTES
Spoke with sister, Jesica Guerrero [phone]  Reviewed labs, hgb stll low at 11.4, but no further elevation of MCV or monocytes.     Reviewed labs today and ordered labs and OV for 3 months.  Sister would like him tested for hepatitis  Meseret Hope PA-C

## 2024-03-21 LAB
KAPPA LC SERPL-MCNC: 5.73 MG/DL (ref 0.33–1.94)
KAPPA LC/LAMBDA SER: 1.77 {RATIO} (ref 0.26–1.65)
LAMBDA LC SERPL-MCNC: 3.23 MG/DL (ref 0.57–2.63)

## 2024-03-30 LAB
ALBUMIN: 4 G/DL (ref 3.4–5)
ALPHA 1 GLOBULIN: 0.3 G/DL (ref 0.2–0.6)
ALPHA 2 GLOBULIN: 0.9 G/DL (ref 0.4–1.1)
BETA GLOBULIN: 0.9 G/DL (ref 0.5–1.2)
GAMMA GLOBULIN: 1.1 G/DL (ref 0.5–1.4)
IMMUNOFIXATION COMMENT: NORMAL
PATH REVIEW - SERUM IMMUNOFIXATION: NORMAL
PATH REVIEW-SERUM PROTEIN ELECTROPHORESIS: NORMAL
PROTEIN ELECTROPHORESIS COMMENT: NORMAL

## 2024-04-09 ENCOUNTER — OFFICE VISIT (OUTPATIENT)
Dept: OTOLARYNGOLOGY | Facility: CLINIC | Age: 69
End: 2024-04-09
Payer: MEDICARE

## 2024-04-09 VITALS — WEIGHT: 160 LBS | BODY MASS INDEX: 25.71 KG/M2 | HEIGHT: 66 IN

## 2024-04-09 DIAGNOSIS — H69.93 DYSFUNCTION OF BOTH EUSTACHIAN TUBES: Primary | ICD-10-CM

## 2024-04-09 DIAGNOSIS — H90.6 MIXED CONDUCTIVE AND SENSORINEURAL HEARING LOSS OF BOTH EARS: ICD-10-CM

## 2024-04-09 DIAGNOSIS — H61.23 BILATERAL IMPACTED CERUMEN: ICD-10-CM

## 2024-04-09 DIAGNOSIS — F79 INTELLECTUAL DISABILITY: ICD-10-CM

## 2024-04-09 PROCEDURE — 1157F ADVNC CARE PLAN IN RCRD: CPT | Performed by: OTOLARYNGOLOGY

## 2024-04-09 PROCEDURE — 3048F LDL-C <100 MG/DL: CPT | Performed by: OTOLARYNGOLOGY

## 2024-04-09 PROCEDURE — 1160F RVW MEDS BY RX/DR IN RCRD: CPT | Performed by: OTOLARYNGOLOGY

## 2024-04-09 PROCEDURE — 4004F PT TOBACCO SCREEN RCVD TLK: CPT | Performed by: OTOLARYNGOLOGY

## 2024-04-09 PROCEDURE — 4010F ACE/ARB THERAPY RXD/TAKEN: CPT | Performed by: OTOLARYNGOLOGY

## 2024-04-09 PROCEDURE — 99213 OFFICE O/P EST LOW 20 MIN: CPT | Performed by: OTOLARYNGOLOGY

## 2024-04-09 PROCEDURE — 1159F MED LIST DOCD IN RCRD: CPT | Performed by: OTOLARYNGOLOGY

## 2024-04-09 NOTE — PROGRESS NOTES
Chief Complaint   Patient presents with    Follow-up     LOV: 1/2024 TUBE CK      HPI  He is here for every 3 month ear cleaning  Benjie Waters is a 68 y.o. male with recurrent cerumen impactions. He is s/p left tube placed March 23, 2022 and a right perforation. 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   History:  with severe MR. He is status post left myringotomy that has healed and right tympanic membrane perforation. He has a chronic eustachian tube dysfunction bilaterally. He smokes. He is using Allegra for his allergies      Past Medical History:   Diagnosis Date    Acute atopic conjunctivitis, unspecified eye 12/27/2017    Acute allergic conjunctivitis    Acute atopic conjunctivitis, unspecified eye 12/27/2017    Acute allergic conjunctivitis    Aphakia, unspecified eye 10/04/2022    Aphakia    Disorder of lipoprotein metabolism, unspecified     Abnormal cholesterol test    Dry eye syndrome of bilateral lacrimal glands 12/27/2017    Dry eye syndrome, bilateral    Dry eye syndrome of bilateral lacrimal glands 12/27/2017    Dry eye syndrome, bilateral    Encounter for other general examination     Podiatry visit, routine    Essential (primary) hypertension 07/27/2013    Hypertension    Ischemic optic neuropathy, unspecified eye 10/04/2022    AION (anterior ischemic optic neuropathy)    Other specified diabetes mellitus without complications (CMS/HCC)     Diabetes mellitus of other type without complication    Personal history of other diseases of the nervous system and sense organs     History of obstructive sleep apnea    Personal history of other diseases of the respiratory system     History of chronic obstructive lung disease    Personal history of other infectious and parasitic diseases     History of athlete's foot    Primary open-angle glaucoma, unspecified eye, severe stage 12/27/2017    Chronic glaucoma, severe stage    Tinea unguium     Mycotic toenails             Medications:     Current Outpatient Medications:     artificial tears, dextran-hypomel-glycerin, 0.1-0.3-0.2 % ophthalmic solution, Administer 1 drop into affected eye(s) 4 times a day., Disp: , Rfl:     ascorbic acid (Vitamin C) 1,000 mg tablet, Take 1 tablet (1,000 mg) by mouth once daily., Disp: , Rfl:     atorvastatin (Lipitor) 40 mg tablet, Take 1 tablet (40 mg) by mouth once daily., Disp: , Rfl:     blood sugar diagnostic (FreeStyle Lite Strips) strip, Use once daily and as needed, Disp: 100 each, Rfl: 3    brimonidine (AlphaGAN) 0.2 % ophthalmic solution, Administer 1 drop into both eyes 2 times a day., Disp: 15 mL, Rfl: 6    fexofenadine (Allegra Allergy) 180 mg tablet, Take 1 tablet (180 mg) by mouth. TAKE PER DIRECTED, Disp: , Rfl:     flaxseed oiL 1,000 mg capsule, Take by mouth. TAKE PER DIECTED, Disp: , Rfl:     glimepiride (Amaryl) 4 mg tablet, Take 1 tablet (4 mg) by mouth once daily., Disp: , Rfl:     GLIMEPIRIDE ORAL, Take by mouth. PER DIRECTED, Disp: , Rfl:     ketorolac (Acular) 0.5 % ophthalmic solution, 1 drop 4 times a day. OPERATIVE EYE, Disp: , Rfl:     losartan (Cozaar) 25 mg tablet, Take 1 tablet (25 mg) by mouth once daily., Disp: 90 tablet, Rfl: 0    metFORMIN  mg 24 hr tablet, Take 2 tablets (1,000 mg) by mouth 2 times a day., Disp: 360 tablet, Rfl: 1    montelukast (Singulair) 10 mg tablet, Take 1 tablet (10 mg) by mouth once daily., Disp: 90 tablet, Rfl: 0    multivit-min/FA/lycopen/lutein (COMPLETE MULTI 50+ ORAL), Take 1 capsule by mouth once daily., Disp: , Rfl:     ofloxacin (Ocuflox) 0.3 % ophthalmic solution, DO PER DIRECTED, Disp: , Rfl:     olopatadine (Pataday) 0.2 % ophthalmic solution, Administer 1 drop into affected eye(s) once daily. PER DIRECTED, Disp: , Rfl:     pantoprazole (ProtoNix) 40 mg EC tablet, TAKE 1 TABLET BY MOUTH EVERY DAY, Disp: 90 tablet, Rfl: 0    vit A/vit C/vit E/zinc/copper (VITAMINS A,C,E-ZINC-COPPER ORAL), Take by mouth. TAKE PER  "DIRECTED, Disp: , Rfl:      Allergies:  Allergies   Allergen Reactions    Bacitracin Unknown    Erythromycin Unknown        Physical Exam:  Last Recorded Vitals  Height 1.664 m (5' 5.5\"), weight 72.6 kg (160 lb).  []General appearance: Well-developed, well-nourished in no acute distress, conversant with normal voice quality    Head/face: No erythema or edema or facial tenderness, and normal facial nerve function bilaterally    External ear: Clear external auditory canals with normal pinnae, bilateral cerumen impactions removed  Tube status: Left T-tube in place  Middle ear: Large right tympanic membrane perforation.  Left middle ear mucosa looks normal  Tympanic membrane perforation: N/A  Mastoid bowl: N/A  Hearing: Normal conversational awareness at normal speech thresholds    Nose visualized using: Anterior rhinoscopy  Nasal dorsum: Nontraumatic midline appearance  Septum: Midline, nonobstructing  Inferior turbinates: Normal, pink  Secretions: Dry    Oral cavity and oropharynx: Normal  Teeth: Good condition  Floor of mouth: without lesions  Palate: Normal hard palate, soft palate and uvula  Oropharynx: Clear, no lesions present  Buccal mucosa: Normal without masses or lesions  Lips: Normal    Nasopharynx: Inadequate mirror exam secondary to gag/anatomy    Neck:  Salivary glands: Normal bilateral parotid and submandibular glands by inspection and palpation.  Non-thyroid masses: No palpable masses or significant lymphadenopathy  Trachea: Midline  Thyroid: No thyromegaly or palpable nodules  Temporomandibular joint: Nontender  Cervical range of motion: Normal    Neurologic exam: Alert and oriented x3, appropriate affect.  Cranial nerves II-XII normal bilaterally  Extraocular movement: Extraocular movement intact, normal gaze alignment    Assessment/Plan   Encounter Diagnoses   Name Primary?    Dysfunction of both eustachian tubes Yes    Intellectual disability     Mixed conductive and sensorineural hearing loss of " both ears     Bilateral impacted cerumen            Ears cleaned.  Recheck in 3 months.  Tube in good position.  Continue with hearing aid use    Lupillo Weinstein MD

## 2024-04-16 ENCOUNTER — CLINICAL SUPPORT (OUTPATIENT)
Dept: AUDIOLOGY | Facility: CLINIC | Age: 69
End: 2024-04-16
Payer: MEDICARE

## 2024-04-16 DIAGNOSIS — H90.A31 MIXED CONDUCTIVE AND SENSORINEURAL HEARING LOSS OF RIGHT EAR WITH RESTRICTED HEARING OF LEFT EAR: Primary | ICD-10-CM

## 2024-04-16 PROCEDURE — HRANC PR HEARING AID NO CHARGE: Performed by: AUDIOLOGIST

## 2024-04-16 NOTE — PROGRESS NOTES
HEARING AID CHECK    RIGHT: RESOUND OMNIA 588 RECHARGEABLE BTE WITH METAL EARHOOK AND STANDARD EARMOLD  S.N.: [id number]  LEFT: RESOUND OMNIA 588 RECHARGEABLE BTE WITH PLASTIC EARHOOK AND STANDARD EARMOLD  S.N.: [id number]  WARRANTY EXPIRES: 6/26/2023    The patient is here today for a hearing aid clean and check.  His sister reported that Benjie seems to be hearing well with his hearing aids.  Cleaned and dehumidified both hearing aids and vacuumed the microphones.  Replaced the tubing and plugged the vent (pressure vent) in the right earmold.  The post cleaning listening check revealed good sound quality in both hearing aids.  Connected the hearing aids to the Resound software and increased the gain in the left hearing aid slightly to approximate his targets.  The patient reported that he could hear well after today's cleaning and adjustments.  Recall in six months for tubing replacements.  N/C as it is still in the first year of care.      APPOINTMENT TIME: 11:30-12:00

## 2024-04-18 ENCOUNTER — HOSPITAL ENCOUNTER (OUTPATIENT)
Dept: RADIOLOGY | Facility: CLINIC | Age: 69
Discharge: HOME | End: 2024-04-18
Payer: MEDICARE

## 2024-04-18 DIAGNOSIS — R91.1 LUNG NODULE: ICD-10-CM

## 2024-04-18 PROCEDURE — 71250 CT THORAX DX C-: CPT | Performed by: RADIOLOGY

## 2024-04-18 PROCEDURE — 71250 CT THORAX DX C-: CPT

## 2024-04-19 ENCOUNTER — TELEPHONE (OUTPATIENT)
Dept: PRIMARY CARE | Facility: CLINIC | Age: 69
End: 2024-04-19
Payer: MEDICARE

## 2024-04-19 DIAGNOSIS — R91.8 PULMONARY NODULES: ICD-10-CM

## 2024-04-19 DIAGNOSIS — I25.10 CORONARY ARTERY DISEASE INVOLVING NATIVE CORONARY ARTERY OF NATIVE HEART WITHOUT ANGINA PECTORIS: Primary | ICD-10-CM

## 2024-04-19 PROBLEM — F17.210 NICOTINE DEPENDENCE, CIGARETTES, UNCOMPLICATED: Status: ACTIVE | Noted: 2021-08-11

## 2024-04-19 PROBLEM — H47.019 ANTERIOR ISCHEMIC OPTIC NEUROPATHY: Status: ACTIVE | Noted: 2024-04-19

## 2024-04-19 PROBLEM — E78.2 MIXED DYSLIPIDEMIA: Status: ACTIVE | Noted: 2018-10-05

## 2024-04-19 PROBLEM — E78.89 LIPIDS ABNORMAL: Status: ACTIVE | Noted: 2024-04-19

## 2024-04-19 PROBLEM — E83.52 HYPERCALCEMIA: Status: ACTIVE | Noted: 2024-03-20

## 2024-04-19 PROBLEM — E11.9 TYPE 2 DIABETES MELLITUS (MULTI): Status: ACTIVE | Noted: 2018-05-21

## 2024-04-19 PROBLEM — I10 HYPERTENSION: Status: ACTIVE | Noted: 2021-08-11

## 2024-04-19 PROBLEM — G47.33 OBSTRUCTIVE SLEEP APNEA SYNDROME: Status: ACTIVE | Noted: 2024-04-19

## 2024-04-19 PROBLEM — R06.83 SNORING: Status: ACTIVE | Noted: 2024-04-19

## 2024-04-19 PROBLEM — H25.10 NUCLEAR SCLEROTIC CATARACT: Status: ACTIVE | Noted: 2021-10-18

## 2024-04-19 PROBLEM — B35.1 ONYCHOMYCOSIS OF TOENAIL: Status: ACTIVE | Noted: 2024-04-19

## 2024-04-19 PROBLEM — D63.8 ANEMIA OF CHRONIC DISEASE: Status: ACTIVE | Noted: 2020-02-18

## 2024-04-19 PROBLEM — M54.50 LOW BACK PAIN: Status: ACTIVE | Noted: 2024-02-19

## 2024-04-19 NOTE — OP NOTE
PREOPERATIVE DIAGNOSIS:  Cataract, left eye, H25.12.    POSTOPERATIVE DIAGNOSIS:  Cataract, left eye, H25.12.    OPERATION/PROCEDURE:  Phacoemulsification with lens implant.os    SURGEON:  Panfilo Ingram MD.    ASSISTANT(S):    ANESTHESIA:    DESCRIPTION OF PROCEDURE:  The patient was brought to the operating room, prepped and draped in  usual sterile fashion.  After satisfactory anesthesia and akinesia, a  lid speculum was placed in the eye.  Incision was made.  Capsulorrhexis was performed.  Under viscoelastic, the cataract was  removed using 20% power, 10 seconds, 100 cc.  A +5 diopter lens was  placed in the eye in the sulcus.  The pupil came down round and  anterior.  This was placed because of a questionable stability of the  bag.  The viscoelastic was removed.  The wound was closed with  interrupted 10-0 nylon.  Knots were buried.  Patched and Osei Shield.  The patient went to Recovery in good condition.       Panfilo Ingram MD    DD:  04/19/2023 10:37:55 EST  DT:  04/19/2023 11:18:39 EST  DICTATION NUMBER:  [id number]  INTERNAL JOB NUMBER:  [id number]    CC:  Panfilo Ingram MD, Fax: [phone]       Electronic Signatures:  Panfilo Ingram) (Signed on 30-Apr-2023 14:36)   Authored   Unsigned, Draft (SYS GENERATED) (Entered on 19-Apr-2023 11:18)   Entered     Last Updated: 30-Apr-2023 14:36 by Panfilo Ingram)

## 2024-05-02 ENCOUNTER — TELEPHONE (OUTPATIENT)
Dept: PRIMARY CARE | Facility: CLINIC | Age: 69
End: 2024-05-02
Payer: MEDICARE

## 2024-05-02 DIAGNOSIS — K21.9 CHRONIC GERD: ICD-10-CM

## 2024-05-02 RX ORDER — PANTOPRAZOLE SODIUM 40 MG/1
40 TABLET, DELAYED RELEASE ORAL DAILY
Qty: 90 TABLET | Refills: 0 | Status: SHIPPED | OUTPATIENT
Start: 2024-05-02

## 2024-05-02 NOTE — TELEPHONE ENCOUNTER
Patient called Rx line and requested a refill for Pantoprazole 40 mg.   Pharmacy: [facility] [address]  Patient phone #: [phone]

## 2024-05-07 ENCOUNTER — OFFICE VISIT (OUTPATIENT)
Dept: OPHTHALMOLOGY | Facility: CLINIC | Age: 69
End: 2024-05-07
Payer: MEDICARE

## 2024-05-07 DIAGNOSIS — Z96.1 HISTORY OF INTRAOCULAR LENS IMPLANT: ICD-10-CM

## 2024-05-07 DIAGNOSIS — H40.1133 PRIMARY OPEN ANGLE GLAUCOMA (POAG) OF BOTH EYES, SEVERE STAGE: Primary | ICD-10-CM

## 2024-05-07 DIAGNOSIS — H18.421 BAND KERATOPATHY OF RIGHT EYE: ICD-10-CM

## 2024-05-07 PROCEDURE — 99214 OFFICE O/P EST MOD 30 MIN: CPT | Performed by: OPHTHALMOLOGY

## 2024-05-07 ASSESSMENT — ENCOUNTER SYMPTOMS
NEUROLOGICAL NEGATIVE: 0
EYES NEGATIVE: 0
ENDOCRINE NEGATIVE: 0
ALLERGIC/IMMUNOLOGIC NEGATIVE: 0
MUSCULOSKELETAL NEGATIVE: 0
HEMATOLOGIC/LYMPHATIC NEGATIVE: 0
CARDIOVASCULAR NEGATIVE: 0
CONSTITUTIONAL NEGATIVE: 0
RESPIRATORY NEGATIVE: 0
PSYCHIATRIC NEGATIVE: 0
GASTROINTESTINAL NEGATIVE: 0

## 2024-05-07 ASSESSMENT — TONOMETRY
OD_IOP_MMHG: 16
OS_IOP_MMHG: 10
IOP_METHOD: GOLDMANN APPLANATION

## 2024-05-07 ASSESSMENT — PACHYMETRY
OD_CT(UM): 557
OS_CT(UM): 556

## 2024-05-07 ASSESSMENT — CONF VISUAL FIELD
OS_INFERIOR_TEMPORAL_RESTRICTION: 1
OS_INFERIOR_NASAL_RESTRICTION: 1
OD_INFERIOR_TEMPORAL_RESTRICTION: 1
OS_SUPERIOR_NASAL_RESTRICTION: 1
OD_SUPERIOR_TEMPORAL_RESTRICTION: 1
OS_SUPERIOR_TEMPORAL_RESTRICTION: 1
OD_INFERIOR_NASAL_RESTRICTION: 1
OD_SUPERIOR_NASAL_RESTRICTION: 1

## 2024-05-07 ASSESSMENT — VISUAL ACUITY
METHOD: SNELLEN - LINEAR
OD_SC: NLP
OS_SC: HM

## 2024-05-07 ASSESSMENT — CUP TO DISC RATIO
OD_RATIO: 99
OS_RATIO: 95

## 2024-05-07 ASSESSMENT — SLIT LAMP EXAM - LIDS
COMMENTS: NORMAL
COMMENTS: NORMAL

## 2024-05-10 DIAGNOSIS — K21.9 CHRONIC GERD: ICD-10-CM

## 2024-05-10 DIAGNOSIS — H18.20 CORNEAL EDEMA: Primary | ICD-10-CM

## 2024-05-13 ENCOUNTER — TELEPHONE (OUTPATIENT)
Dept: OPHTHALMOLOGY | Facility: CLINIC | Age: 69
End: 2024-05-13
Payer: MEDICARE

## 2024-05-13 RX ORDER — PANTOPRAZOLE SODIUM 40 MG/1
40 TABLET, DELAYED RELEASE ORAL DAILY
Qty: 90 TABLET | Refills: 0 | OUTPATIENT
Start: 2024-05-13

## 2024-05-28 DIAGNOSIS — J43.9 PULMONARY EMPHYSEMA, UNSPECIFIED EMPHYSEMA TYPE (MULTI): ICD-10-CM

## 2024-05-28 RX ORDER — MONTELUKAST SODIUM 10 MG/1
10 TABLET ORAL DAILY
Qty: 90 TABLET | Refills: 1 | Status: SHIPPED | OUTPATIENT
Start: 2024-05-28

## 2024-05-29 DIAGNOSIS — I10 BENIGN ESSENTIAL HTN: ICD-10-CM

## 2024-05-29 NOTE — TELEPHONE ENCOUNTER
Patient called to get a refill of Losartan 25 mg.  Please send to [facility] on [address] in [place].    Patient's number:  [phone]

## 2024-05-30 RX ORDER — LOSARTAN POTASSIUM 25 MG/1
25 TABLET ORAL DAILY
Qty: 90 TABLET | Refills: 1 | Status: SHIPPED | OUTPATIENT
Start: 2024-05-30

## 2024-06-05 ENCOUNTER — TELEPHONE (OUTPATIENT)
Dept: PRIMARY CARE | Facility: CLINIC | Age: 69
End: 2024-06-05
Payer: MEDICARE

## 2024-06-05 DIAGNOSIS — E11.9 DIABETES MELLITUS TYPE 2 WITHOUT RETINOPATHY (MULTI): ICD-10-CM

## 2024-06-05 RX ORDER — GLIMEPIRIDE 4 MG/1
4 TABLET ORAL DAILY
Qty: 90 TABLET | Refills: 0 | Status: SHIPPED | OUTPATIENT
Start: 2024-06-05 | End: 2024-06-11 | Stop reason: ALTCHOICE

## 2024-06-05 NOTE — TELEPHONE ENCOUNTER
Rx Refill Request Telephone Encounter    Name:  Benjie ACUÑA Jt  :  [id number]  Medication Name:  Glimepiride 4 mg, has 1 left, Montelukast 10 mg             Specific Pharmacy location:   [facility]  Date of last appointment:    Date of next appointment:    Best number to reach patient:

## 2024-06-07 PROBLEM — E78.2 MIXED HYPERLIPIDEMIA: Status: ACTIVE | Noted: 2021-08-11

## 2024-06-10 RX ORDER — PREDNISOLONE ACETATE 10 MG/ML
SUSPENSION/ DROPS OPHTHALMIC
Qty: 5 ML | Refills: 2 | Status: SHIPPED | OUTPATIENT
Start: 2024-06-10

## 2024-06-11 ENCOUNTER — OFFICE VISIT (OUTPATIENT)
Dept: CARDIOLOGY | Facility: CLINIC | Age: 69
End: 2024-06-11
Payer: MEDICARE

## 2024-06-11 ENCOUNTER — LAB (OUTPATIENT)
Dept: LAB | Facility: CLINIC | Age: 69
End: 2024-06-11
Payer: MEDICARE

## 2024-06-11 ENCOUNTER — TELEPHONE (OUTPATIENT)
Dept: PRIMARY CARE | Facility: CLINIC | Age: 69
End: 2024-06-11
Payer: MEDICARE

## 2024-06-11 VITALS
BODY MASS INDEX: 25.52 KG/M2 | DIASTOLIC BLOOD PRESSURE: 58 MMHG | WEIGHT: 158.8 LBS | HEIGHT: 66 IN | OXYGEN SATURATION: 94 % | SYSTOLIC BLOOD PRESSURE: 92 MMHG | HEART RATE: 80 BPM

## 2024-06-11 DIAGNOSIS — I25.10 CORONARY ARTERY DISEASE INVOLVING NATIVE CORONARY ARTERY OF NATIVE HEART WITHOUT ANGINA PECTORIS: ICD-10-CM

## 2024-06-11 DIAGNOSIS — D63.8 ANEMIA OF CHRONIC DISEASE: ICD-10-CM

## 2024-06-11 DIAGNOSIS — E11.9 DIABETES MELLITUS TYPE 2 WITHOUT RETINOPATHY (MULTI): Primary | ICD-10-CM

## 2024-06-11 DIAGNOSIS — J34.89 SORE IN NOSE: ICD-10-CM

## 2024-06-11 PROBLEM — H52.03 HYPEROPIA OF BOTH EYES: Status: ACTIVE | Noted: 2024-06-11

## 2024-06-11 PROBLEM — E78.5 DYSLIPIDEMIA: Status: ACTIVE | Noted: 2020-06-09

## 2024-06-11 LAB
ALBUMIN SERPL BCP-MCNC: 4.3 G/DL (ref 3.4–5)
ALP SERPL-CCNC: 55 U/L (ref 33–136)
ALT SERPL W P-5'-P-CCNC: 12 U/L (ref 10–52)
ANION GAP SERPL CALC-SCNC: 13 MMOL/L (ref 10–20)
AST SERPL W P-5'-P-CCNC: 14 U/L (ref 9–39)
BASOPHILS # BLD AUTO: 0.02 X10*3/UL (ref 0–0.1)
BASOPHILS NFR BLD AUTO: 0.3 %
BILIRUB SERPL-MCNC: 0.4 MG/DL (ref 0–1.2)
BUN SERPL-MCNC: 24 MG/DL (ref 6–23)
CALCIUM SERPL-MCNC: 10.6 MG/DL (ref 8.6–10.3)
CHLORIDE SERPL-SCNC: 102 MMOL/L (ref 98–107)
CO2 SERPL-SCNC: 29 MMOL/L (ref 21–32)
CREAT SERPL-MCNC: 1.13 MG/DL (ref 0.5–1.3)
EGFRCR SERPLBLD CKD-EPI 2021: 71 ML/MIN/1.73M*2
EOSINOPHIL # BLD AUTO: 0.15 X10*3/UL (ref 0–0.7)
EOSINOPHIL NFR BLD AUTO: 2.5 %
ERYTHROCYTE [DISTWIDTH] IN BLOOD BY AUTOMATED COUNT: 13.7 % (ref 11.5–14.5)
FERRITIN SERPL-MCNC: 51 NG/ML (ref 20–300)
GLUCOSE SERPL-MCNC: 114 MG/DL (ref 74–99)
HBV CORE IGM SER QL: NONREACTIVE
HBV SURFACE AB SER-ACNC: >1000 MIU/ML
HBV SURFACE AG SERPL QL IA: NONREACTIVE
HCT VFR BLD AUTO: 36.9 % (ref 41–52)
HCV AB SER QL: NONREACTIVE
HGB BLD-MCNC: 12 G/DL (ref 13.5–17.5)
IGA SERPL-MCNC: 298 MG/DL (ref 70–400)
IGG SERPL-MCNC: 1260 MG/DL (ref 700–1600)
IGM SERPL-MCNC: 64 MG/DL (ref 40–230)
IMM GRANULOCYTES # BLD AUTO: 0.02 X10*3/UL (ref 0–0.7)
IMM GRANULOCYTES NFR BLD AUTO: 0.3 % (ref 0–0.9)
IRON SATN MFR SERPL: 26 % (ref 25–45)
IRON SERPL-MCNC: 77 UG/DL (ref 35–150)
LYMPHOCYTES # BLD AUTO: 1.46 X10*3/UL (ref 1.2–4.8)
LYMPHOCYTES NFR BLD AUTO: 24.7 %
MCH RBC QN AUTO: 32 PG (ref 26–34)
MCHC RBC AUTO-ENTMCNC: 32.5 G/DL (ref 32–36)
MCV RBC AUTO: 98 FL (ref 80–100)
MONOCYTES # BLD AUTO: 0.71 X10*3/UL (ref 0.1–1)
MONOCYTES NFR BLD AUTO: 12 %
NEUTROPHILS # BLD AUTO: 3.54 X10*3/UL (ref 1.2–7.7)
NEUTROPHILS NFR BLD AUTO: 60.2 %
NRBC BLD-RTO: 0 /100 WBCS (ref 0–0)
PLATELET # BLD AUTO: 196 X10*3/UL (ref 150–450)
POTASSIUM SERPL-SCNC: 4.7 MMOL/L (ref 3.5–5.3)
PROT SERPL-MCNC: 7.3 G/DL (ref 6.4–8.2)
PROT SERPL-MCNC: 7.4 G/DL (ref 6.4–8.2)
RBC # BLD AUTO: 3.75 X10*6/UL (ref 4.5–5.9)
SODIUM SERPL-SCNC: 139 MMOL/L (ref 136–145)
TIBC SERPL-MCNC: 301 UG/DL (ref 240–445)
UIBC SERPL-MCNC: 224 UG/DL (ref 110–370)
VIT B12 SERPL-MCNC: 260 PG/ML (ref 211–911)
WBC # BLD AUTO: 5.9 X10*3/UL (ref 4.4–11.3)

## 2024-06-11 PROCEDURE — 93005 ELECTROCARDIOGRAM TRACING: CPT | Performed by: INTERNAL MEDICINE

## 2024-06-11 PROCEDURE — 93010 ELECTROCARDIOGRAM REPORT: CPT | Performed by: INTERNAL MEDICINE

## 2024-06-11 PROCEDURE — 83540 ASSAY OF IRON: CPT | Performed by: NURSE PRACTITIONER

## 2024-06-11 PROCEDURE — 3074F SYST BP LT 130 MM HG: CPT | Performed by: INTERNAL MEDICINE

## 2024-06-11 PROCEDURE — 99204 OFFICE O/P NEW MOD 45 MIN: CPT | Performed by: INTERNAL MEDICINE

## 2024-06-11 PROCEDURE — 86705 HEP B CORE ANTIBODY IGM: CPT | Performed by: NURSE PRACTITIONER

## 2024-06-11 PROCEDURE — 86706 HEP B SURFACE ANTIBODY: CPT | Performed by: NURSE PRACTITIONER

## 2024-06-11 PROCEDURE — 83521 IG LIGHT CHAINS FREE EACH: CPT | Performed by: NURSE PRACTITIONER

## 2024-06-11 PROCEDURE — 82784 ASSAY IGA/IGD/IGG/IGM EACH: CPT | Mod: 91 | Performed by: NURSE PRACTITIONER

## 2024-06-11 PROCEDURE — 4010F ACE/ARB THERAPY RXD/TAKEN: CPT | Performed by: INTERNAL MEDICINE

## 2024-06-11 PROCEDURE — 36415 COLL VENOUS BLD VENIPUNCTURE: CPT

## 2024-06-11 PROCEDURE — 82607 VITAMIN B-12: CPT | Performed by: NURSE PRACTITIONER

## 2024-06-11 PROCEDURE — 1126F AMNT PAIN NOTED NONE PRSNT: CPT | Performed by: INTERNAL MEDICINE

## 2024-06-11 PROCEDURE — 86803 HEPATITIS C AB TEST: CPT | Performed by: NURSE PRACTITIONER

## 2024-06-11 PROCEDURE — 3048F LDL-C <100 MG/DL: CPT | Performed by: INTERNAL MEDICINE

## 2024-06-11 PROCEDURE — 99214 OFFICE O/P EST MOD 30 MIN: CPT | Performed by: INTERNAL MEDICINE

## 2024-06-11 PROCEDURE — 1157F ADVNC CARE PLAN IN RCRD: CPT | Performed by: INTERNAL MEDICINE

## 2024-06-11 PROCEDURE — 3078F DIAST BP <80 MM HG: CPT | Performed by: INTERNAL MEDICINE

## 2024-06-11 PROCEDURE — 82728 ASSAY OF FERRITIN: CPT | Performed by: NURSE PRACTITIONER

## 2024-06-11 PROCEDURE — 86334 IMMUNOFIX E-PHORESIS SERUM: CPT | Performed by: NURSE PRACTITIONER

## 2024-06-11 PROCEDURE — 84155 ASSAY OF PROTEIN SERUM: CPT | Performed by: NURSE PRACTITIONER

## 2024-06-11 PROCEDURE — 85025 COMPLETE CBC W/AUTO DIFF WBC: CPT

## 2024-06-11 PROCEDURE — 80053 COMPREHEN METABOLIC PANEL: CPT

## 2024-06-11 PROCEDURE — 1159F MED LIST DOCD IN RCRD: CPT | Performed by: INTERNAL MEDICINE

## 2024-06-11 PROCEDURE — 87340 HEPATITIS B SURFACE AG IA: CPT | Performed by: NURSE PRACTITIONER

## 2024-06-11 RX ORDER — REGADENOSON 0.08 MG/ML
0.4 INJECTION, SOLUTION INTRAVENOUS
OUTPATIENT
Start: 2024-06-11

## 2024-06-11 ASSESSMENT — ENCOUNTER SYMPTOMS
LOSS OF SENSATION IN FEET: 0
DEPRESSION: 0
OCCASIONAL FEELINGS OF UNSTEADINESS: 1

## 2024-06-11 ASSESSMENT — PAIN SCALES - GENERAL: PAINLEVEL: 0-NO PAIN

## 2024-06-11 NOTE — TELEPHONE ENCOUNTER
Patients sister has his POA and is asking if Gila can place a referral to dermatology. He has a sore on the side of his nose and she is worried because their sister had skin cancer.

## 2024-06-12 LAB
ATRIAL RATE: 69 BPM
KAPPA LC SERPL-MCNC: 5.5 MG/DL (ref 0.33–1.94)
KAPPA LC/LAMBDA SER: 1.56 {RATIO} (ref 0.26–1.65)
LAMBDA LC SERPL-MCNC: 3.52 MG/DL (ref 0.57–2.63)
P AXIS: -10 DEGREES
P OFFSET: 205 MS
P ONSET: 154 MS
PR INTERVAL: 126 MS
Q ONSET: 217 MS
QRS COUNT: 11 BEATS
QRS DURATION: 92 MS
QT INTERVAL: 358 MS
QTC CALCULATION(BAZETT): 383 MS
QTC FREDERICIA: 375 MS
R AXIS: 81 DEGREES
T AXIS: 48 DEGREES
T OFFSET: 396 MS
VENTRICULAR RATE: 69 BPM

## 2024-06-14 LAB
ALBUMIN: 4.1 G/DL (ref 3.4–5)
ALPHA 1 GLOBULIN: 0.3 G/DL (ref 0.2–0.6)
ALPHA 2 GLOBULIN: 0.9 G/DL (ref 0.4–1.1)
BETA GLOBULIN: 0.9 G/DL (ref 0.5–1.2)
GAMMA GLOBULIN: 1.1 G/DL (ref 0.5–1.4)
IMMUNOFIXATION COMMENT: NORMAL
PATH REVIEW - SERUM IMMUNOFIXATION: NORMAL
PATH REVIEW-SERUM PROTEIN ELECTROPHORESIS: NORMAL
PROTEIN ELECTROPHORESIS COMMENT: NORMAL

## 2024-06-18 ENCOUNTER — OFFICE VISIT (OUTPATIENT)
Dept: HEMATOLOGY/ONCOLOGY | Facility: CLINIC | Age: 69
End: 2024-06-18
Payer: MEDICARE

## 2024-06-18 VITALS
BODY MASS INDEX: 25.07 KG/M2 | HEART RATE: 77 BPM | HEIGHT: 66 IN | OXYGEN SATURATION: 98 % | RESPIRATION RATE: 18 BRPM | TEMPERATURE: 97.5 F | DIASTOLIC BLOOD PRESSURE: 74 MMHG | SYSTOLIC BLOOD PRESSURE: 116 MMHG | WEIGHT: 155.98 LBS

## 2024-06-18 DIAGNOSIS — D63.8 ANEMIA OF CHRONIC DISEASE: ICD-10-CM

## 2024-06-18 PROCEDURE — 3074F SYST BP LT 130 MM HG: CPT | Performed by: NURSE PRACTITIONER

## 2024-06-18 PROCEDURE — 1126F AMNT PAIN NOTED NONE PRSNT: CPT | Performed by: NURSE PRACTITIONER

## 2024-06-18 PROCEDURE — 4010F ACE/ARB THERAPY RXD/TAKEN: CPT | Performed by: NURSE PRACTITIONER

## 2024-06-18 PROCEDURE — 3078F DIAST BP <80 MM HG: CPT | Performed by: NURSE PRACTITIONER

## 2024-06-18 PROCEDURE — 3048F LDL-C <100 MG/DL: CPT | Performed by: NURSE PRACTITIONER

## 2024-06-18 PROCEDURE — 99214 OFFICE O/P EST MOD 30 MIN: CPT | Performed by: NURSE PRACTITIONER

## 2024-06-18 PROCEDURE — 1157F ADVNC CARE PLAN IN RCRD: CPT | Performed by: NURSE PRACTITIONER

## 2024-06-18 ASSESSMENT — COLUMBIA-SUICIDE SEVERITY RATING SCALE - C-SSRS
1. IN THE PAST MONTH, HAVE YOU WISHED YOU WERE DEAD OR WISHED YOU COULD GO TO SLEEP AND NOT WAKE UP?: NO
2. HAVE YOU ACTUALLY HAD ANY THOUGHTS OF KILLING YOURSELF?: NO
6. HAVE YOU EVER DONE ANYTHING, STARTED TO DO ANYTHING, OR PREPARED TO DO ANYTHING TO END YOUR LIFE?: NO

## 2024-06-18 ASSESSMENT — PATIENT HEALTH QUESTIONNAIRE - PHQ9
1. LITTLE INTEREST OR PLEASURE IN DOING THINGS: NOT AT ALL
2. FEELING DOWN, DEPRESSED OR HOPELESS: NOT AT ALL
SUM OF ALL RESPONSES TO PHQ9 QUESTIONS 1 AND 2: 0

## 2024-06-18 ASSESSMENT — PAIN SCALES - GENERAL: PAINLEVEL: 0-NO PAIN

## 2024-06-18 NOTE — PROGRESS NOTES
"Patient ID: Benjie Waters is a 68 y.o. male.  Referring Physician: Meseret Hope PA-C  [address]  [place] [address]  Primary Care Provider: Meseret De La Torre PA-C  Visit Type: Follow Up      Subjective    HPI  Mr Waters is mentally disabled 69yo who presents with his sister, Jesica Justice, for evaluation of anemia. Labs done 10/4/23 showed WBC 5.3, hgb 11.6 nand plt 222. There was noted macrocytosis with  with normal B12 549 and monocytosis 11.7%. CMP was normal with exception of hypercalcemia of 11. She denies weight loss, fevers, night sweats or recurrent infections. He is heavy smoker and CT scan  4/18/24 showed spiculated RUL lesion 0.7cm with multiple smaller nodules, emphysema and CAD. No reported cough, congestion or shortness of breath. He had colonoscopy 2021 reportedly normal. Iron levels were good in 2021 and HCV negative 8/7/21.   He is not taking oral iron.  Sister was told to stop his B12 when it got to 500, but currently has dropped to 260.      Review of Systems - Oncology Per sister he is doing well, maintain same level of activity, no complaints and no observation of abnormalities.     Objective   BSA: 1.81 meters squared  /74 (BP Location: Right arm, Patient Position: Sitting, BP Cuff Size: Adult)   Pulse 77   Temp 36.4 °C (97.5 °F) (Temporal)   Resp 18   Ht (S) 1.664 m (5' 5.51\")   Wt 70.8 kg (155 lb 15.6 oz)   SpO2 98%   BMI 25.55 kg/m²      has a past medical history of Acute atopic conjunctivitis, unspecified eye (12/27/2017), Acute atopic conjunctivitis, unspecified eye (12/27/2017), Aphakia, unspecified eye (10/04/2022), Disorder of lipoprotein metabolism, unspecified, Dry eye syndrome of bilateral lacrimal glands (12/27/2017), Dry eye syndrome of bilateral lacrimal glands (12/27/2017), Encounter for other general examination, Essential (primary) hypertension (07/27/2013), Ischemic optic neuropathy, unspecified eye (10/04/2022), Other specified " diabetes mellitus without complications (Multi), Personal history of other diseases of the nervous system and sense organs, Personal history of other diseases of the respiratory system, Personal history of other infectious and parasitic diseases, Primary open-angle glaucoma, unspecified eye, severe stage (12/27/2017), and Tinea unguium.   has a past surgical history that includes Eye surgery (07/15/2014) and Other surgical history (04/06/2021).  Family History   Problem Relation Name Age of Onset    No Known Problems Mother         Benjie Waters  reports that he has been smoking cigarettes. He has a 50 pack-year smoking history. He has been exposed to tobacco smoke. He has never used smokeless tobacco.  He  reports that he does not currently use alcohol.  He  reports no history of drug use.  Mentally disabled lives with sister who is care provider    Physical Exam  Constitutional:       Comments: Some interaction, sits and cooperates with exam   Eyes:      Comments: Blind   Cardiovascular:      Rate and Rhythm: Normal rate and regular rhythm.      Pulses: Normal pulses.      Heart sounds: Normal heart sounds.   Pulmonary:      Effort: Pulmonary effort is normal.      Breath sounds: Normal breath sounds.   Abdominal:      Palpations: Abdomen is soft.   Musculoskeletal:         General: Normal range of motion.   Lymphadenopathy:      Cervical: No cervical adenopathy.   Skin:     Coloration: Skin is pale. Skin is not jaundiced.      Findings: No bruising.   Neurological:      Motor: No weakness.     WBC   Date/Time Value Ref Range Status   06/11/2024 10:19 AM 5.9 4.4 - 11.3 x10*3/uL Final   03/20/2024 10:19 AM 6.1 4.4 - 11.3 x10*3/uL Final   12/15/2023 11:02 AM 5.1 4.4 - 11.3 x10*3/uL Final     nRBC   Date Value Ref Range Status   06/11/2024 0.0 0.0 - 0.0 /100 WBCs Final   03/20/2024 0.0 0.0 - 0.0 /100 WBCs Final   12/15/2023 0.0 0.0 - 0.0 /100 WBCs Final     RBC   Date Value Ref Range Status   06/11/2024 3.75 (L)  4.50 - 5.90 x10*6/uL Final   03/20/2024 3.51 (L) 4.50 - 5.90 x10*6/uL Final   12/15/2023 3.59 (L) 4.50 - 5.90 x10*6/uL Final     Hemoglobin   Date Value Ref Range Status   06/11/2024 12.0 (L) 13.5 - 17.5 g/dL Final   03/20/2024 11.4 (L) 13.5 - 17.5 g/dL Final   12/15/2023 11.7 (L) 13.5 - 17.5 g/dL Final     Hematocrit   Date Value Ref Range Status   06/11/2024 36.9 (L) 41.0 - 52.0 % Final   03/20/2024 34.2 (L) 41.0 - 52.0 % Final   12/15/2023 35.5 (L) 41.0 - 52.0 % Final     MCV   Date/Time Value Ref Range Status   06/11/2024 10:19 AM 98 80 - 100 fL Final   03/20/2024 10:19 AM 97 80 - 100 fL Final   12/15/2023 11:02 AM 99 80 - 100 fL Final     MCH   Date/Time Value Ref Range Status   06/11/2024 10:19 AM 32.0 26.0 - 34.0 pg Final   03/20/2024 10:19 AM 32.5 26.0 - 34.0 pg Final   12/15/2023 11:02 AM 32.6 26.0 - 34.0 pg Final     MCHC   Date/Time Value Ref Range Status   06/11/2024 10:19 AM 32.5 32.0 - 36.0 g/dL Final   03/20/2024 10:19 AM 33.3 32.0 - 36.0 g/dL Final   12/15/2023 11:02 AM 33.0 32.0 - 36.0 g/dL Final     RDW   Date/Time Value Ref Range Status   06/11/2024 10:19 AM 13.7 11.5 - 14.5 % Final   03/20/2024 10:19 AM 13.5 11.5 - 14.5 % Final   12/15/2023 11:02 AM 13.5 11.5 - 14.5 % Final     Platelets   Date/Time Value Ref Range Status   06/11/2024 10:19  150 - 450 x10*3/uL Final   03/20/2024 10:19  150 - 450 x10*3/uL Final   12/15/2023 11:02  150 - 450 x10*3/uL Final     MPV   Date/Time Value Ref Range Status   10/04/2023 09:19 AM 11.4 7.5 - 11.5 fL Final   08/07/2021 09:29 AM 10.8 7.0 - 12.6 CU Final   06/03/2020 09:55 AM 11.0 7.0 - 12.6 CU Final     Neutrophils %   Date/Time Value Ref Range Status   06/11/2024 10:19 AM 60.2 40.0 - 80.0 % Final   03/20/2024 10:19 AM 61.9 40.0 - 80.0 % Final   12/15/2023 11:02 AM 58.0 40.0 - 80.0 % Final     Immature Granulocytes %, Automated   Date/Time Value Ref Range Status   06/11/2024 10:19 AM 0.3 0.0 - 0.9 % Final     Comment:     Immature Granulocyte  Count (IG) includes promyelocytes, myelocytes and metamyelocytes but does not include bands. Percent differential counts (%) should be interpreted in the context of the absolute cell counts (cells/UL).   03/20/2024 10:19 AM 0.5 0.0 - 0.9 % Final     Comment:     Immature Granulocyte Count (IG) includes promyelocytes, myelocytes and metamyelocytes but does not include bands. Percent differential counts (%) should be interpreted in the context of the absolute cell counts (cells/UL).   12/15/2023 11:02 AM 0.2 0.0 - 0.9 % Final     Comment:     Immature Granulocyte Count (IG) includes promyelocytes, myelocytes and metamyelocytes but does not include bands. Percent differential counts (%) should be interpreted in the context of the absolute cell counts (cells/UL).     Lymphocytes %   Date/Time Value Ref Range Status   06/11/2024 10:19 AM 24.7 13.0 - 44.0 % Final   03/20/2024 10:19 AM 25.7 13.0 - 44.0 % Final   12/15/2023 11:02 AM 28.5 13.0 - 44.0 % Final     Monocytes %   Date/Time Value Ref Range Status   06/11/2024 10:19 AM 12.0 2.0 - 10.0 % Final   03/20/2024 10:19 AM 11.6 2.0 - 10.0 % Final   12/15/2023 11:02 AM 11.5 2.0 - 10.0 % Final     Eosinophils %   Date/Time Value Ref Range Status   06/11/2024 10:19 AM 2.5 0.0 - 6.0 % Final   03/20/2024 10:19 AM 0.0 0.0 - 6.0 % Final   12/15/2023 11:02 AM 1.4 0.0 - 6.0 % Final     Basophils %   Date/Time Value Ref Range Status   06/11/2024 10:19 AM 0.3 0.0 - 2.0 % Final   03/20/2024 10:19 AM 0.3 0.0 - 2.0 % Final   12/15/2023 11:02 AM 0.4 0.0 - 2.0 % Final     Neutrophils Absolute   Date/Time Value Ref Range Status   06/11/2024 10:19 AM 3.54 1.20 - 7.70 x10*3/uL Final     Comment:     Percent differential counts (%) should be interpreted in the context of the absolute cell counts (cells/uL).   03/20/2024 10:19 AM 3.77 1.20 - 7.70 x10*3/uL Final     Comment:     Percent differential counts (%) should be interpreted in the context of the absolute cell counts (cells/uL).    12/15/2023 11:02 AM 2.93 1.20 - 7.70 x10*3/uL Final     Comment:     Percent differential counts (%) should be interpreted in the context of the absolute cell counts (cells/uL).     Immature Granulocytes Absolute, Automated   Date/Time Value Ref Range Status   06/11/2024 10:19 AM 0.02 0.00 - 0.70 x10*3/uL Final   03/20/2024 10:19 AM 0.03 0.00 - 0.70 x10*3/uL Final   12/15/2023 11:02 AM 0.01 0.00 - 0.70 x10*3/uL Final     Lymphocytes Absolute   Date/Time Value Ref Range Status   06/11/2024 10:19 AM 1.46 1.20 - 4.80 x10*3/uL Final   03/20/2024 10:19 AM 1.57 1.20 - 4.80 x10*3/uL Final   12/15/2023 11:02 AM 1.44 1.20 - 4.80 x10*3/uL Final     Monocytes Absolute   Date/Time Value Ref Range Status   06/11/2024 10:19 AM 0.71 0.10 - 1.00 x10*3/uL Final   03/20/2024 10:19 AM 0.71 0.10 - 1.00 x10*3/uL Final   12/15/2023 11:02 AM 0.58 0.10 - 1.00 x10*3/uL Final     Eosinophils Absolute   Date/Time Value Ref Range Status   06/11/2024 10:19 AM 0.15 0.00 - 0.70 x10*3/uL Final   03/20/2024 10:19 AM 0.00 0.00 - 0.70 x10*3/uL Final   12/15/2023 11:02 AM 0.07 0.00 - 0.70 x10*3/uL Final     Basophils Absolute   Date/Time Value Ref Range Status   06/11/2024 10:19 AM 0.02 0.00 - 0.10 x10*3/uL Final   03/20/2024 10:19 AM 0.02 0.00 - 0.10 x10*3/uL Final   12/15/2023 11:02 AM 0.02 0.00 - 0.10 x10*3/uL Final         Assessment/Plan    Anemia with macrocytosis and monocytosis in asymptomatic mentally disabled, blind man with normal B12 in Oct 2023 and normal iron and negative HCV in 2021.  Colonoscopy reportedly normal in 2021, currently without GI symptoms of diarrhea alternating with constipation and fullness LUQ noted on exam.  He was noted to have hypercalcemia of 11 10/4/23 and now 10.6.  He denies specific bone or joint pain.      His ironlevbels are good .  B12 is lower at 260 and recommend resuming oral B12.   Liquid, 1 dropper SL every day.      Heavy tobacco abuse with abnormal CT, needs to follow up with PCP to schedule repeat  and ask about seeing cardiologist to evaluate calcifications.          Diagnoses and all orders for this visit:  Anemia of chronic disease  -     Clinic Appointment Request  -     CBC and Auto Differential; Future  -     Comprehensive Metabolic Panel; Future  -     Ferritin; Future  -     Iron and TIBC; Future  -     Vitamin B12; Future  -     Clinic Appointment Request Follow up; Future           Meseret Hope PA-C

## 2024-07-01 NOTE — PROGRESS NOTES
Primary Care Physician: Meseret De La Torre PA-C  Date of Visit: 06/11/2024  2:15 PM EDT  Location of visit: [facility]     Chief Complaint:   Chief Complaint   Patient presents with   • New Patient Visit   • Hypertension     HPI / Summary:   Benjie Wtaers is a 68 y.o. male presents for new patient    ROS    Medical History:   He has a past medical history of Acute atopic conjunctivitis, unspecified eye (12/27/2017), Acute atopic conjunctivitis, unspecified eye (12/27/2017), Aphakia, unspecified eye (10/04/2022), Disorder of lipoprotein metabolism, unspecified, Dry eye syndrome of bilateral lacrimal glands (12/27/2017), Dry eye syndrome of bilateral lacrimal glands (12/27/2017), Encounter for other general examination, Essential (primary) hypertension (07/27/2013), Ischemic optic neuropathy, unspecified eye (10/04/2022), Other specified diabetes mellitus without complications (Multi), Personal history of other diseases of the nervous system and sense organs, Personal history of other diseases of the respiratory system, Personal history of other infectious and parasitic diseases, Primary open-angle glaucoma, unspecified eye, severe stage (12/27/2017), and Tinea unguium.  Surgical Hx:   He has a past surgical history that includes Eye surgery (07/15/2014) and Other surgical history (04/06/2021).   Social Hx:   He reports that he has been smoking cigarettes. He has a 50 pack-year smoking history. He has been exposed to tobacco smoke. He has never used smokeless tobacco. He reports that he does not currently use alcohol. He reports that he does not use drugs.  Family Hx:   His family history includes No Known Problems in his mother.   Allergies:  Allergies   Allergen Reactions   • Bacitracin Unknown   • Erythromycin Unknown     Outpatient Medications:  Current Outpatient Medications   Medication Instructions   • artificial tears, dextran-hypomel-glycerin, 0.1-0.3-0.2 % ophthalmic solution 1 drop,  "ophthalmic (eye), 4 times daily   • ascorbic acid (VITAMIN C) 1,000 mg, oral, Daily   • atorvastatin (LIPITOR) 40 mg, oral, Daily   • blood sugar diagnostic (FreeStyle Lite Strips) strip Use once daily and as needed   • brimonidine (AlphaGAN) 0.2 % ophthalmic solution 1 drop, Both Eyes, 2 times daily   • empagliflozin (JARDIANCE) 10 mg, oral, Daily   • fexofenadine (ALLEGRA ALLERGY) 180 mg, oral, Daily PRN, TAKE PER DIRECTED   • flaxseed oiL 1,000 mg, oral, Daily, TAKE PER DIECTED   • losartan (COZAAR) 25 mg, oral, Daily   • metFORMIN XR (GLUCOPHAGE-XR) 1,000 mg, oral, 2 times daily   • montelukast (SINGULAIR) 10 mg, oral, Daily   • olopatadine (Pataday) 0.2 % ophthalmic solution 1 drop, ophthalmic (eye), Daily RT, PER DIRECTED   • pantoprazole (PROTONIX) 40 mg, oral, Daily   • prednisoLONE acetate (Pred-Forte) 1 % ophthalmic suspension APPLY 1 DROP 4 TIMES DAILY INTO SURGICAL EYE STARTING 1 DAY AFTER SURGERY     Physical Exam:  Vitals:    06/11/24 1416 06/11/24 1444   BP: 106/67 92/58   BP Location: Right arm    Patient Position: Sitting    BP Cuff Size: Adult    Pulse: 77 80   SpO2: 94%    Weight: 72 kg (158 lb 12.8 oz)    Height: 1.676 m (5' 6\")      Wt Readings from Last 5 Encounters:   06/18/24 70.8 kg (155 lb 15.6 oz)   06/11/24 72 kg (158 lb 12.8 oz)   04/09/24 72.6 kg (160 lb)   03/18/24 72.6 kg (160 lb)   01/16/24 72.6 kg (160 lb)     Physical Exam  JVP not elevated. Carotid impulses are 2+ without overlying bruit.   Chest exhibits fair to good air movement with completely clear breath sounds.   The cardiac rhythm is regular with no premature beats.   Normal S1 and S2. No gallop, murmur or rub, or click.   Abdomen is soft and benign without focal tenderness.   With no lower leg edema. The pedal pulses are intact.     Last Labs:  Lab on 06/11/2024   Component Date Value   • WBC 06/11/2024 5.9    • nRBC 06/11/2024 0.0    • RBC 06/11/2024 3.75 (L)    • Hemoglobin 06/11/2024 12.0 (L)    • Hematocrit 06/11/2024 " 36.9 (L)    • MCV 06/11/2024 98    • MCH 06/11/2024 32.0    • MCHC 06/11/2024 32.5    • RDW 06/11/2024 13.7    • Platelets 06/11/2024 196    • Neutrophils % 06/11/2024 60.2    • Immature Granulocytes %,* 06/11/2024 0.3    • Lymphocytes % 06/11/2024 24.7    • Monocytes % 06/11/2024 12.0    • Eosinophils % 06/11/2024 2.5    • Basophils % 06/11/2024 0.3    • Neutrophils Absolute 06/11/2024 3.54    • Immature Granulocytes Ab* 06/11/2024 0.02    • Lymphocytes Absolute 06/11/2024 1.46    • Monocytes Absolute 06/11/2024 0.71    • Eosinophils Absolute 06/11/2024 0.15    • Basophils Absolute 06/11/2024 0.02    • Glucose 06/11/2024 114 (H)    • Sodium 06/11/2024 139    • Potassium 06/11/2024 4.7    • Chloride 06/11/2024 102    • Bicarbonate 06/11/2024 29    • Anion Gap 06/11/2024 13    • Urea Nitrogen 06/11/2024 24 (H)    • Creatinine 06/11/2024 1.13    • eGFR 06/11/2024 71    • Calcium 06/11/2024 10.6 (H)    • Albumin 06/11/2024 4.3    • Alkaline Phosphatase 06/11/2024 55    • Total Protein 06/11/2024 7.4    • AST 06/11/2024 14    • Bilirubin, Total 06/11/2024 0.4    • ALT 06/11/2024 12    • Hepatitis B Core AB; IgM 06/11/2024 Nonreactive    • Hepatitis B Surface AG 06/11/2024 Nonreactive    • Hepatitis B Surface AB 06/11/2024 >1,000.0 (H)    • Hepatitis C AB 06/11/2024 Nonreactive    • IgG 06/11/2024 1,260    • IgA 06/11/2024 298    • IgM 06/11/2024 64    • Iron 06/11/2024 77    • UIBC 06/11/2024 224    • TIBC 06/11/2024 301    • % Saturation 06/11/2024 26    • Ig [place] Free Light Chain 06/11/2024 5.50 (H)    • Ig Lambda Free Light Luzma* 06/11/2024 3.52 (H)    • Kappa/Lambda Ratio 06/11/2024 1.56    • Vitamin B12 06/11/2024 260    • Ferritin 06/11/2024 51    • Total Protein 06/11/2024 7.3    • Albumin 06/11/2024 4.1    • Alpha 1 Globulin 06/11/2024 0.3    • Alpha 2 Globulin 06/11/2024 0.9    • Beta Globulin 06/11/2024 0.9    • Gamma 06/11/2024 1.1    • Protein Electrophoresis * 06/11/2024 Normal.    • Immunofixation Comment  06/11/2024 No monoclonal protein detected by immunofixation.    • Path Review - Serum Prot* 06/11/2024 Reviewed and approved by AMARILYS PULLIAM on 6/14/24 at 10:48 AM.        • Path Review - Serum Immu* 06/11/2024 Reviewed and approved by AMARILYS PULLIAM on 6/14/24 at 10:48 AM.        Lab on 03/20/2024   Component Date Value   • Cholesterol 03/20/2024 125    • HDL-Cholesterol 03/20/2024 39.9    • Cholesterol/HDL Ratio 03/20/2024 3.1    • LDL Calculated 03/20/2024 69    • VLDL 03/20/2024 16    • Triglycerides 03/20/2024 80    • Non HDL Cholesterol 03/20/2024 85    • Ig [place] Free Light Chain 03/20/2024 5.73 (H)    • Ig Lambda Free Light Luzma* 03/20/2024 3.23 (H)    • Kappa/Lambda Ratio 03/20/2024 1.77 (H)    • IgG 03/20/2024 1,190    • IgA 03/20/2024 273    • IgM 03/20/2024 63    • Glucose 03/20/2024 117 (H)    • Sodium 03/20/2024 139    • Potassium 03/20/2024 4.2    • Chloride 03/20/2024 104    • Bicarbonate 03/20/2024 27    • Anion Gap 03/20/2024 12    • Urea Nitrogen 03/20/2024 25 (H)    • Creatinine 03/20/2024 1.13    • eGFR 03/20/2024 71    • Calcium 03/20/2024 10.7 (H)    • Albumin 03/20/2024 4.1    • Alkaline Phosphatase 03/20/2024 52    • Total Protein 03/20/2024 7.0    • AST 03/20/2024 13    • Bilirubin, Total 03/20/2024 0.5    • ALT 03/20/2024 11    • WBC 03/20/2024 6.1    • nRBC 03/20/2024 0.0    • RBC 03/20/2024 3.51 (L)    • Hemoglobin 03/20/2024 11.4 (L)    • Hematocrit 03/20/2024 34.2 (L)    • MCV 03/20/2024 97    • MCH 03/20/2024 32.5    • MCHC 03/20/2024 33.3    • RDW 03/20/2024 13.5    • Platelets 03/20/2024 185    • Neutrophils % 03/20/2024 61.9    • Immature Granulocytes %,* 03/20/2024 0.5    • Lymphocytes % 03/20/2024 25.7    • Monocytes % 03/20/2024 11.6    • Eosinophils % 03/20/2024 0.0    • Basophils % 03/20/2024 0.3    • Neutrophils Absolute 03/20/2024 3.77    • Immature Granulocytes Ab* 03/20/2024 0.03    • Lymphocytes Absolute 03/20/2024 1.57    • Monocytes Absolute 03/20/2024 0.71    •  Eosinophils Absolute 03/20/2024 0.00    • Basophils Absolute 03/20/2024 0.02    • Total Protein 03/20/2024 7.1    • Albumin 03/20/2024 4.0    • Alpha 1 Globulin 03/20/2024 0.3    • Alpha 2 Globulin 03/20/2024 0.9    • Beta Globulin 03/20/2024 0.9    • Gamma 03/20/2024 1.1    • Protein Electrophoresis * 03/20/2024 Normal.    • Immunofixation Comment 03/20/2024 No monoclonal protein detected by immunofixation.    • Path Review - Serum Prot* 03/20/2024 Reviewed and approved by AMARILYS PULLIAM on 3/30/24 at 10:14 AM.        • Path Review - Serum Immu* 03/20/2024 Reviewed and approved by AMARILYS PULLIAM on 3/30/24 at 10:14 AM.        Office Visit on 03/18/2024   Component Date Value   • POC HEMOGLOBIN A1c 03/18/2024 6.5    Office Visit on 12/15/2023   Component Date Value   • WBC 12/15/2023 5.1    • nRBC 12/15/2023 0.0    • RBC 12/15/2023 3.59 (L)    • Hemoglobin 12/15/2023 11.7 (L)    • Hematocrit 12/15/2023 35.5 (L)    • MCV 12/15/2023 99    • MCH 12/15/2023 32.6    • MCHC 12/15/2023 33.0    • RDW 12/15/2023 13.5    • Platelets 12/15/2023 195    • Neutrophils % 12/15/2023 58.0    • Immature Granulocytes %,* 12/15/2023 0.2    • Lymphocytes % 12/15/2023 28.5    • Monocytes % 12/15/2023 11.5    • Eosinophils % 12/15/2023 1.4    • Basophils % 12/15/2023 0.4    • Neutrophils Absolute 12/15/2023 2.93    • Immature Granulocytes Ab* 12/15/2023 0.01    • Lymphocytes Absolute 12/15/2023 1.44    • Monocytes Absolute 12/15/2023 0.58    • Eosinophils Absolute 12/15/2023 0.07    • Basophils Absolute 12/15/2023 0.02    • Glucose 12/15/2023 228 (H)    • Sodium 12/15/2023 138    • Potassium 12/15/2023 4.5    • Chloride 12/15/2023 100    • Bicarbonate 12/15/2023 31    • Anion Gap 12/15/2023 12    • Urea Nitrogen 12/15/2023 21    • Creatinine 12/15/2023 1.13    • eGFR 12/15/2023 71    • Calcium 12/15/2023 11.1 (H)    • Albumin 12/15/2023 4.3    • Alkaline Phosphatase 12/15/2023 55    • Total Protein 12/15/2023 7.4    • AST 12/15/2023 15     • Bilirubin, Total 12/15/2023 0.4    • ALT 12/15/2023 16    • Ferritin 12/15/2023 65    • Iron 12/15/2023 102    • UIBC 12/15/2023 229    • TIBC 12/15/2023 331    • % Saturation 12/15/2023 31    • IgG 12/15/2023 1,200    • IgA 12/15/2023 264    • IgM 12/15/2023 60    • Ig [place] Free Light Chain 12/15/2023 5.67 (H)    • Ig Lambda Free Light Luzma* 12/15/2023 3.04 (H)    • Kappa/Lambda Ratio 12/15/2023 1.87 (H)    • Thyroid Stimulating Horm* 12/15/2023 1.83    • HIV 1/2 Antigen/Antibody* 12/15/2023 Nonreactive    • EBV VCA, IgG  12/15/2023 Positive (A)    • EBV VCA, IgM  12/15/2023     • EBV Early Antigen Antibo* 12/15/2023 Negative    • EBV Nuclear Antigen Anti* 12/15/2023 Positive (A)    • LDH 12/15/2023 106    • Retic % 12/15/2023 1.0    • Retic Absolute 12/15/2023 0.034    • Reticulocyte Hemoglobin 12/15/2023 37    • Immature Retic fraction 12/15/2023 5.5    • Testosterone 12/15/2023 395    • Myeloid Malignancies Res* 12/15/2023                      Value:This result contains rich text formatting which cannot be displayed here.   • Electronically signed an* 12/15/2023                      Value:Lexy Gill MD PhD    • Total Protein 12/15/2023 7.4    • Albumin 12/15/2023 4.2    • Alpha 1 Globulin 12/15/2023 0.3    • Alpha 2 Globulin 12/15/2023 0.9    • Beta Globulin 12/15/2023 0.9    • Gamma 12/15/2023 1.1    • Protein Electrophoresis * 12/15/2023 Normal.       • Immunofixation Comment 12/15/2023 No monoclonal protein detected by immunofixation.    • Path Review - Serum Prot* 12/15/2023 Reviewed and approved by JOSIE ISSA on 12/19/23 at 5:48 PM.        • Path Review - Serum Immu* 12/15/2023 Reviewed and approved by JOSIE ISSA on 12/19/23 at 5:48 PM.        • Cytomegalovirus IgG 12/15/2023 Nonreactive    • CMV IgM 12/15/2023 <8.0    • EBV VCA IgM Antibody 12/15/2023 18.5         Assessment/Plan   1.  Type 2 diabetes.  The patient has been on oral therapy including metformin 1000 mg twice  daily glimepiride 4 mg every morning.  Patient is also on losartan 25 mg daily despite blood pressure in the lower range of normal due to proteinuria.  The patient's UACR was 52 when checked on 6/13/2023.  The patient's Amaryl will be discontinued in favor of Jardiance 10 mg daily.  2.  Hyperlipidemia.  This patient had been on atorvastatin 20 mg daily for many years but the dose was increased to 40 mg daily several years ago.  He did have a lipid panel on 3/20/2024 with cholesterol 125 LDL 69 HDL 39 triglyceride 80.  He will continue present management.  3.  Chronic smoking 1 pack/day.  This patient did have a chest CT on 4/18/2024 showing multiple additional pulmonary nodules along with severe coronary artery calcification.  4.  Coronary artery disease.  This patient has CT coronary calcium score of 265 when performed on 11/27/2023.  EKG performed today 6/11/2024 shows sinus rhythm and is unremarkable.  Patient will be scheduled for a pharmacological nuclear stress test.  5.  History of cerebral palsy and MRDD.  6.  COPD.  7.  Status post bilateral cataract extractions.  8.  Diminished hearing.        Orders:  Orders Placed This Encounter   Procedures   • ECG 12 lead (Clinic Performed)      Followup Appts:  Future Appointments   Date Time Provider Department Center   7/8/2024  9:30 AM EDY NM ADMIN ROOM 1 [facility]   7/8/2024 10:00 AM EDY NM 1 [facility]   7/8/2024 10:30 AM EDY STRESS LAB 1 [id number] [facility]   7/8/2024 11:00 AM EDY NM ADMIN ROOM 1 [facility]   7/8/2024 11:15 AM EDY NM 1 [facility]   7/16/2024 10:15 AM MD JUAN [id number] [facility]   8/15/2024  9:15 AM [facility] [id number] CT [id number] [facility] [place]   9/3/2024  7:45 AM Panfilo Ingram MD [id number] [place]   9/30/2024  9:00 AM Meseret De La Torre PA-C [id number] [place]   10/15/2024  9:00 AM MD EFRAIN [id number] [facility]   10/15/2024  9:30 AM Theodore Perez [id number] [facility]   12/11/2024  2:30 PM Man Sandoval MD [id number]  [place]   12/17/2024  9:30 GARY Hope PA-C [id number] [facility]           ____________________________________________________________  Man Sandoval MD  [facility] [place]  Assistant Clinical Professor, [employer] [facility]

## 2024-07-08 ENCOUNTER — HOSPITAL ENCOUNTER (OUTPATIENT)
Dept: RADIOLOGY | Facility: HOSPITAL | Age: 69
Discharge: HOME | End: 2024-07-08
Payer: MEDICARE

## 2024-07-08 ENCOUNTER — HOSPITAL ENCOUNTER (OUTPATIENT)
Dept: CARDIOLOGY | Facility: HOSPITAL | Age: 69
Discharge: HOME | End: 2024-07-08
Payer: MEDICARE

## 2024-07-08 DIAGNOSIS — I25.10 CORONARY ARTERY DISEASE INVOLVING NATIVE CORONARY ARTERY OF NATIVE HEART WITHOUT ANGINA PECTORIS: ICD-10-CM

## 2024-07-08 PROCEDURE — A9502 TC99M TETROFOSMIN: HCPCS | Performed by: INTERNAL MEDICINE

## 2024-07-08 PROCEDURE — 93017 CV STRESS TEST TRACING ONLY: CPT

## 2024-07-08 PROCEDURE — 3430000001 HC RX 343 DIAGNOSTIC RADIOPHARMACEUTICALS: Performed by: INTERNAL MEDICINE

## 2024-07-08 PROCEDURE — 78452 HT MUSCLE IMAGE SPECT MULT: CPT

## 2024-07-08 PROCEDURE — 78452 HT MUSCLE IMAGE SPECT MULT: CPT | Performed by: STUDENT IN AN ORGANIZED HEALTH CARE EDUCATION/TRAINING PROGRAM

## 2024-07-08 PROCEDURE — 2500000004 HC RX 250 GENERAL PHARMACY W/ HCPCS (ALT 636 FOR OP/ED): Performed by: INTERNAL MEDICINE

## 2024-07-08 RX ORDER — REGADENOSON 0.08 MG/ML
0.4 INJECTION, SOLUTION INTRAVENOUS
Status: COMPLETED | OUTPATIENT
Start: 2024-07-08 | End: 2024-07-08

## 2024-07-09 PROCEDURE — 93016 CV STRESS TEST SUPVJ ONLY: CPT | Performed by: INTERNAL MEDICINE

## 2024-07-09 PROCEDURE — 93018 CV STRESS TEST I&R ONLY: CPT | Performed by: INTERNAL MEDICINE

## 2024-07-16 ENCOUNTER — APPOINTMENT (OUTPATIENT)
Dept: OTOLARYNGOLOGY | Facility: CLINIC | Age: 69
End: 2024-07-16
Payer: MEDICARE

## 2024-07-18 ENCOUNTER — APPOINTMENT (OUTPATIENT)
Dept: RADIOLOGY | Facility: CLINIC | Age: 69
End: 2024-07-18
Payer: MEDICARE

## 2024-07-23 ENCOUNTER — APPOINTMENT (OUTPATIENT)
Dept: OTOLARYNGOLOGY | Facility: CLINIC | Age: 69
End: 2024-07-23
Payer: MEDICARE

## 2024-07-23 VITALS — WEIGHT: 158 LBS | BODY MASS INDEX: 26.33 KG/M2 | HEIGHT: 65 IN

## 2024-07-23 DIAGNOSIS — H69.93 DYSFUNCTION OF BOTH EUSTACHIAN TUBES: Primary | ICD-10-CM

## 2024-07-23 DIAGNOSIS — F79 INTELLECTUAL DISABILITY: ICD-10-CM

## 2024-07-23 DIAGNOSIS — H61.23 BILATERAL IMPACTED CERUMEN: ICD-10-CM

## 2024-07-23 DIAGNOSIS — H90.6 MIXED CONDUCTIVE AND SENSORINEURAL HEARING LOSS OF BOTH EARS: ICD-10-CM

## 2024-07-23 PROCEDURE — 4004F PT TOBACCO SCREEN RCVD TLK: CPT | Performed by: OTOLARYNGOLOGY

## 2024-07-23 PROCEDURE — 1159F MED LIST DOCD IN RCRD: CPT | Performed by: OTOLARYNGOLOGY

## 2024-07-23 PROCEDURE — 99213 OFFICE O/P EST LOW 20 MIN: CPT | Performed by: OTOLARYNGOLOGY

## 2024-07-23 PROCEDURE — 3048F LDL-C <100 MG/DL: CPT | Performed by: OTOLARYNGOLOGY

## 2024-07-23 PROCEDURE — 4010F ACE/ARB THERAPY RXD/TAKEN: CPT | Performed by: OTOLARYNGOLOGY

## 2024-07-23 PROCEDURE — 3008F BODY MASS INDEX DOCD: CPT | Performed by: OTOLARYNGOLOGY

## 2024-07-23 PROCEDURE — 1157F ADVNC CARE PLAN IN RCRD: CPT | Performed by: OTOLARYNGOLOGY

## 2024-07-23 NOTE — PROGRESS NOTES
Chief Complaint   Patient presents with    Cerumen Impaction     LOV: 4/2024 EAR CLEANING      HPI  He is here for every 3 month ear cleaning  Benjie Waters is a 68 y.o. male with recurrent cerumen impactions. He is s/p left tube placed March 23, 2022 and a right perforation. 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   History:  with severe MR. He is status post left myringotomy that has healed and right tympanic membrane perforation. He has a chronic eustachian tube dysfunction bilaterally. He smokes. He is using Allegra for his allergies      Past Medical History:   Diagnosis Date    Acute atopic conjunctivitis, unspecified eye 12/27/2017    Acute allergic conjunctivitis    Acute atopic conjunctivitis, unspecified eye 12/27/2017    Acute allergic conjunctivitis    Aphakia, unspecified eye 10/04/2022    Aphakia    Disorder of lipoprotein metabolism, unspecified     Abnormal cholesterol test    Dry eye syndrome of bilateral lacrimal glands 12/27/2017    Dry eye syndrome, bilateral    Dry eye syndrome of bilateral lacrimal glands 12/27/2017    Dry eye syndrome, bilateral    Encounter for other general examination     Podiatry visit, routine    Essential (primary) hypertension 07/27/2013    Hypertension    Ischemic optic neuropathy, unspecified eye 10/04/2022    AION (anterior ischemic optic neuropathy)    Other specified diabetes mellitus without complications (Multi)     Diabetes mellitus of other type without complication    Personal history of other diseases of the nervous system and sense organs     History of obstructive sleep apnea    Personal history of other diseases of the respiratory system     History of chronic obstructive lung disease    Personal history of other infectious and parasitic diseases     History of athlete's foot    Primary open-angle glaucoma, unspecified eye, severe stage 12/27/2017    Chronic glaucoma, severe stage    Tinea unguium     Mycotic toenails     "        Medications:     Current Outpatient Medications:     artificial tears, dextran-hypomel-glycerin, 0.1-0.3-0.2 % ophthalmic solution, Administer 1 drop into affected eye(s) 4 times a day., Disp: , Rfl:     ascorbic acid (Vitamin C) 1,000 mg tablet, Take 1 tablet (1,000 mg) by mouth once daily., Disp: , Rfl:     atorvastatin (Lipitor) 40 mg tablet, Take 1 tablet (40 mg) by mouth once daily., Disp: , Rfl:     blood sugar diagnostic (FreeStyle Lite Strips) strip, Use once daily and as needed, Disp: 100 each, Rfl: 3    brimonidine (AlphaGAN) 0.2 % ophthalmic solution, Administer 1 drop into both eyes 2 times a day., Disp: 15 mL, Rfl: 6    empagliflozin (Jardiance) 10 mg, Take 1 tablet (10 mg) by mouth once daily., Disp: 90 tablet, Rfl: 3    fexofenadine (Allegra Allergy) 180 mg tablet, Take 1 tablet (180 mg) by mouth once daily as needed. TAKE PER DIRECTED, Disp: , Rfl:     flaxseed oiL 1,000 mg capsule, Take 1 capsule (1,000 mg) by mouth once daily. TAKE PER DIECTED, Disp: , Rfl:     losartan (Cozaar) 25 mg tablet, Take 1 tablet (25 mg) by mouth once daily., Disp: 90 tablet, Rfl: 1    metFORMIN  mg 24 hr tablet, Take 2 tablets (1,000 mg) by mouth 2 times a day., Disp: 360 tablet, Rfl: 1    montelukast (Singulair) 10 mg tablet, TAKE 1 TABLET BY MOUTH EVERY DAY, Disp: 90 tablet, Rfl: 1    olopatadine (Pataday) 0.2 % ophthalmic solution, Administer 1 drop into affected eye(s) once daily. PER DIRECTED, Disp: , Rfl:     pantoprazole (ProtoNix) 40 mg EC tablet, Take 1 tablet (40 mg) by mouth once daily., Disp: 90 tablet, Rfl: 0    prednisoLONE acetate (Pred-Forte) 1 % ophthalmic suspension, APPLY 1 DROP 4 TIMES DAILY INTO SURGICAL EYE STARTING 1 DAY AFTER SURGERY, Disp: 5 mL, Rfl: 2     Allergies:  Allergies   Allergen Reactions    Bacitracin Unknown    Erythromycin Unknown        Physical Exam:  Last Recorded Vitals  Height 1.651 m (5' 5\"), weight 71.7 kg (158 lb).  []General appearance: Well-developed, " well-nourished in no acute distress, conversant with normal voice quality    Head/face: No erythema or edema or facial tenderness, and normal facial nerve function bilaterally    External ear: Clear external auditory canals with normal pinnae, bilateral cerumen impactions removed  Tube status: Left T-tube in place  Middle ear: Large right tympanic membrane perforation.  Left middle ear mucosa looks normal  Tympanic membrane perforation: N/A  Mastoid bowl: N/A  Hearing: Normal conversational awareness at normal speech thresholds    Nose visualized using: Anterior rhinoscopy  Nasal dorsum: Nontraumatic midline appearance  Septum: Midline, nonobstructing  Inferior turbinates: Normal, pink  Secretions: Dry    Oral cavity and oropharynx: Normal  Teeth: Good condition  Floor of mouth: without lesions  Palate: Normal hard palate, soft palate and uvula  Oropharynx: Clear, no lesions present  Buccal mucosa: Normal without masses or lesions  Lips: Normal    Nasopharynx: Inadequate mirror exam secondary to gag/anatomy    Neck:  Salivary glands: Normal bilateral parotid and submandibular glands by inspection and palpation.  Non-thyroid masses: No palpable masses or significant lymphadenopathy  Trachea: Midline  Thyroid: No thyromegaly or palpable nodules  Temporomandibular joint: Nontender  Cervical range of motion: Normal    Neurologic exam: Alert and oriented x3, appropriate affect.  Cranial nerves II-XII normal bilaterally  Extraocular movement: Extraocular movement intact, normal gaze alignment    Assessment/Plan   Encounter Diagnoses   Name Primary?    Dysfunction of both eustachian tubes Yes    Intellectual disability     Mixed conductive and sensorineural hearing loss of both ears     Bilateral impacted cerumen              Ears cleaned.  Recheck in 3 months.  Tube in good position.  Continue with hearing aid use    Lupillo Weinstein MD

## 2024-08-08 ENCOUNTER — TELEPHONE (OUTPATIENT)
Dept: PRIMARY CARE | Facility: CLINIC | Age: 69
End: 2024-08-08
Payer: MEDICARE

## 2024-08-08 DIAGNOSIS — E78.00 HIGH CHOLESTEROL: ICD-10-CM

## 2024-08-08 DIAGNOSIS — E78.2 MIXED HYPERLIPIDEMIA: ICD-10-CM

## 2024-08-08 DIAGNOSIS — K21.9 CHRONIC GERD: ICD-10-CM

## 2024-08-08 RX ORDER — ATORVASTATIN CALCIUM 40 MG/1
40 TABLET, FILM COATED ORAL DAILY
Qty: 90 TABLET | Refills: 1 | Status: SHIPPED | OUTPATIENT
Start: 2024-08-08

## 2024-08-08 RX ORDER — PANTOPRAZOLE SODIUM 40 MG/1
40 TABLET, DELAYED RELEASE ORAL DAILY
Qty: 90 TABLET | Refills: 0 | Status: SHIPPED | OUTPATIENT
Start: 2024-08-08

## 2024-08-08 NOTE — TELEPHONE ENCOUNTER
Patient wants two refills:  Lipitor 40mg & Pantoprazole 40mg.  [facility] [place]      Patient's number:  [phone]

## 2024-08-15 ENCOUNTER — APPOINTMENT (OUTPATIENT)
Dept: RADIOLOGY | Facility: CLINIC | Age: 69
End: 2024-08-15
Payer: MEDICARE

## 2024-08-15 ENCOUNTER — HOSPITAL ENCOUNTER (OUTPATIENT)
Dept: RADIOLOGY | Facility: CLINIC | Age: 69
Discharge: HOME | End: 2024-08-15
Payer: MEDICARE

## 2024-08-15 DIAGNOSIS — R91.1 SOLITARY PULMONARY NODULE: ICD-10-CM

## 2024-08-15 PROCEDURE — 71250 CT THORAX DX C-: CPT | Performed by: RADIOLOGY

## 2024-08-15 PROCEDURE — 71250 CT THORAX DX C-: CPT

## 2024-08-25 DIAGNOSIS — I10 BENIGN ESSENTIAL HTN: ICD-10-CM

## 2024-08-26 RX ORDER — LOSARTAN POTASSIUM 25 MG/1
25 TABLET ORAL DAILY
Qty: 90 TABLET | Refills: 1 | Status: SHIPPED | OUTPATIENT
Start: 2024-08-26

## 2024-08-27 ENCOUNTER — TELEPHONE (OUTPATIENT)
Dept: PRIMARY CARE | Facility: CLINIC | Age: 69
End: 2024-08-27

## 2024-08-27 ENCOUNTER — OFFICE VISIT (OUTPATIENT)
Dept: CARDIAC SURGERY | Facility: CLINIC | Age: 69
End: 2024-08-27
Payer: MEDICARE

## 2024-08-27 VITALS
BODY MASS INDEX: 25.39 KG/M2 | TEMPERATURE: 98 F | OXYGEN SATURATION: 99 % | RESPIRATION RATE: 16 BRPM | HEIGHT: 66 IN | HEART RATE: 70 BPM | DIASTOLIC BLOOD PRESSURE: 77 MMHG | SYSTOLIC BLOOD PRESSURE: 115 MMHG | WEIGHT: 158 LBS

## 2024-08-27 DIAGNOSIS — R91.1 LUNG NODULE: Primary | ICD-10-CM

## 2024-08-27 PROCEDURE — 1126F AMNT PAIN NOTED NONE PRSNT: CPT | Performed by: THORACIC SURGERY (CARDIOTHORACIC VASCULAR SURGERY)

## 2024-08-27 PROCEDURE — 99205 OFFICE O/P NEW HI 60 MIN: CPT | Performed by: THORACIC SURGERY (CARDIOTHORACIC VASCULAR SURGERY)

## 2024-08-27 PROCEDURE — 99215 OFFICE O/P EST HI 40 MIN: CPT | Performed by: THORACIC SURGERY (CARDIOTHORACIC VASCULAR SURGERY)

## 2024-08-27 PROCEDURE — 4010F ACE/ARB THERAPY RXD/TAKEN: CPT | Performed by: THORACIC SURGERY (CARDIOTHORACIC VASCULAR SURGERY)

## 2024-08-27 PROCEDURE — 1157F ADVNC CARE PLAN IN RCRD: CPT | Performed by: THORACIC SURGERY (CARDIOTHORACIC VASCULAR SURGERY)

## 2024-08-27 PROCEDURE — 3074F SYST BP LT 130 MM HG: CPT | Performed by: THORACIC SURGERY (CARDIOTHORACIC VASCULAR SURGERY)

## 2024-08-27 PROCEDURE — 3008F BODY MASS INDEX DOCD: CPT | Performed by: THORACIC SURGERY (CARDIOTHORACIC VASCULAR SURGERY)

## 2024-08-27 PROCEDURE — 1159F MED LIST DOCD IN RCRD: CPT | Performed by: THORACIC SURGERY (CARDIOTHORACIC VASCULAR SURGERY)

## 2024-08-27 PROCEDURE — 3078F DIAST BP <80 MM HG: CPT | Performed by: THORACIC SURGERY (CARDIOTHORACIC VASCULAR SURGERY)

## 2024-08-27 PROCEDURE — 3048F LDL-C <100 MG/DL: CPT | Performed by: THORACIC SURGERY (CARDIOTHORACIC VASCULAR SURGERY)

## 2024-08-27 PROCEDURE — 4004F PT TOBACCO SCREEN RCVD TLK: CPT | Performed by: THORACIC SURGERY (CARDIOTHORACIC VASCULAR SURGERY)

## 2024-08-27 ASSESSMENT — ENCOUNTER SYMPTOMS
ABDOMINAL PAIN: 0
DIAPHORESIS: 0
EYES NEGATIVE: 1
DIARRHEA: 0
FATIGUE: 0
CHOKING: 0
LOSS OF SENSATION IN FEET: 0
ABDOMINAL DISTENTION: 0
CHILLS: 0
UNEXPECTED WEIGHT CHANGE: 0
FEVER: 0
CONSTIPATION: 0
NEUROLOGICAL NEGATIVE: 1
NAUSEA: 0
OCCASIONAL FEELINGS OF UNSTEADINESS: 0
PALPITATIONS: 0
WHEEZING: 0
DEPRESSION: 0
HEMATOLOGIC/LYMPHATIC NEGATIVE: 1
CHEST TIGHTNESS: 0
VOMITING: 0
SHORTNESS OF BREATH: 0
PSYCHIATRIC NEGATIVE: 1
ENDOCRINE NEGATIVE: 1
STRIDOR: 0
MUSCULOSKELETAL NEGATIVE: 1
ALLERGIC/IMMUNOLOGIC NEGATIVE: 1
APPETITE CHANGE: 0
COUGH: 0

## 2024-08-27 ASSESSMENT — LIFESTYLE VARIABLES
AUDIT TOTAL SCORE: 0
HOW OFTEN DO YOU HAVE A DRINK CONTAINING ALCOHOL: NEVER
SKIP TO QUESTIONS 9-10: 1
HOW MANY STANDARD DRINKS CONTAINING ALCOHOL DO YOU HAVE ON A TYPICAL DAY: PATIENT DOES NOT DRINK
HAVE YOU OR SOMEONE ELSE BEEN INJURED AS A RESULT OF YOUR DRINKING: NO
HAS A RELATIVE, FRIEND, DOCTOR, OR ANOTHER HEALTH PROFESSIONAL EXPRESSED CONCERN ABOUT YOUR DRINKING OR SUGGESTED YOU CUT DOWN: NO
AUDIT-C TOTAL SCORE: 0
HOW OFTEN DO YOU HAVE SIX OR MORE DRINKS ON ONE OCCASION: NEVER

## 2024-08-27 ASSESSMENT — PATIENT HEALTH QUESTIONNAIRE - PHQ9
SUM OF ALL RESPONSES TO PHQ9 QUESTIONS 1 AND 2: 0
2. FEELING DOWN, DEPRESSED OR HOPELESS: NOT AT ALL
1. LITTLE INTEREST OR PLEASURE IN DOING THINGS: NOT AT ALL

## 2024-08-27 ASSESSMENT — PAIN SCALES - GENERAL: PAINLEVEL: 0-NO PAIN

## 2024-08-27 NOTE — PROGRESS NOTES
Subjective   Patient ID: Benjie Waters is a 68 y.o. male who presents for Follow-up (Mr. Waters is present for his Lung Nodule Follow up with Dr. Han/).  HPI    68-year-old male with developmental delay and cerebral palsy who is a smoker.  He was found to have a suspicious right upper lobe lung nodule was been increasing in size.  This is solid nodule that measures about 11 mm in size.  He has been seen by pulmonology.  Pulmonary function test was attempted but it is hard for him to follow instructions.  It is also hard for him to stay still so obtaining a PET scan is not possible.  Same issues with obtaining an IR guided biopsy of this nodule.  I had a candid discussion with his sister about the options.  1 option is to do nothing and continue follow-up with imaging which she is not interested in the other option is to perform a wedge resection of this lesion.  He does not endorse any shortness of breath and has not complain about anything but I think there is a risk of doing this without having more objective data.  However, I think the nodules and a reasonable location for a wedge resection.  I discussed with her the mechanics of a robotic assisted right upper lobe wedge resection with mediastinal lymph nodes.  Will proceed on September 26 at [facility].    Review of Systems   Constitutional:  Negative for appetite change, chills, diaphoresis, fatigue, fever and unexpected weight change.   HENT: Negative.     Eyes: Negative.    Respiratory:  Negative for cough, choking, chest tightness, shortness of breath, wheezing and stridor.    Cardiovascular:  Negative for chest pain, palpitations and leg swelling.   Gastrointestinal:  Negative for abdominal distention, abdominal pain, constipation, diarrhea, nausea and vomiting.   Endocrine: Negative.    Genitourinary: Negative.    Musculoskeletal: Negative.    Skin: Negative.    Allergic/Immunologic: Negative.    Neurological: Negative.    Hematological: Negative.     Psychiatric/Behavioral: Negative.     All other systems reviewed and are negative.      Objective   Physical Exam  Constitutional:       Appearance: Normal appearance.   HENT:      Head: Normocephalic and atraumatic.      Nose: Nose normal.      Mouth/Throat:      Mouth: Mucous membranes are moist.      Pharynx: Oropharynx is clear.   Eyes:      Extraocular Movements: Extraocular movements intact.      Conjunctiva/sclera: Conjunctivae normal.      Pupils: Pupils are equal, round, and reactive to light.   Cardiovascular:      Rate and Rhythm: Normal rate and regular rhythm.      Pulses: Normal pulses.      Heart sounds: Normal heart sounds.   Pulmonary:      Effort: Pulmonary effort is normal. No respiratory distress.      Breath sounds: Normal breath sounds. No stridor. No wheezing, rhonchi or rales.   Chest:      Chest wall: No tenderness.   Abdominal:      General: Abdomen is flat. Bowel sounds are normal.      Palpations: Abdomen is soft.   Musculoskeletal:         General: Normal range of motion.      Cervical back: Normal range of motion and neck supple.   Skin:     General: Skin is warm and dry.      Capillary Refill: Capillary refill takes less than 2 seconds.   Neurological:      General: No focal deficit present.      Mental Status: He is alert and oriented to person, place, and time.         Assessment/Plan   Diagnoses and all orders for this visit:  Lung nodule  -     Case Request Operating Room: Resection Robot-Assisted Lung Wedge  -     Request for Pre-Admission Testing Visit; Jd Johnson MD 08/27/24 1:16 PM

## 2024-09-03 ENCOUNTER — APPOINTMENT (OUTPATIENT)
Dept: OPHTHALMOLOGY | Facility: CLINIC | Age: 69
End: 2024-09-03
Payer: MEDICARE

## 2024-09-03 DIAGNOSIS — H40.1133 PRIMARY OPEN ANGLE GLAUCOMA (POAG) OF BOTH EYES, SEVERE STAGE: ICD-10-CM

## 2024-09-03 PROCEDURE — 99214 OFFICE O/P EST MOD 30 MIN: CPT | Performed by: OPHTHALMOLOGY

## 2024-09-03 RX ORDER — BRIMONIDINE TARTRATE 2 MG/ML
1 SOLUTION/ DROPS OPHTHALMIC 2 TIMES DAILY
Qty: 15 ML | Refills: 3 | Status: SHIPPED | OUTPATIENT
Start: 2024-09-03 | End: 2024-12-02

## 2024-09-03 ASSESSMENT — CONF VISUAL FIELD
OD_INFERIOR_NASAL_RESTRICTION: 1
OS_SUPERIOR_TEMPORAL_RESTRICTION: 1
OD_SUPERIOR_TEMPORAL_RESTRICTION: 1
OS_INFERIOR_NASAL_RESTRICTION: 1
METHOD: COUNTING FINGERS
OD_SUPERIOR_NASAL_RESTRICTION: 1
OS_INFERIOR_TEMPORAL_RESTRICTION: 1
OS_SUPERIOR_NASAL_RESTRICTION: 1
OD_INFERIOR_TEMPORAL_RESTRICTION: 1

## 2024-09-03 ASSESSMENT — PACHYMETRY
OD_CT(UM): 557
OS_CT(UM): 556

## 2024-09-03 ASSESSMENT — ENCOUNTER SYMPTOMS
ALLERGIC/IMMUNOLOGIC NEGATIVE: 0
PSYCHIATRIC NEGATIVE: 0
EYES NEGATIVE: 0
CARDIOVASCULAR NEGATIVE: 0
MUSCULOSKELETAL NEGATIVE: 0
GASTROINTESTINAL NEGATIVE: 0
NEUROLOGICAL NEGATIVE: 0
HEMATOLOGIC/LYMPHATIC NEGATIVE: 0
RESPIRATORY NEGATIVE: 0
CONSTITUTIONAL NEGATIVE: 0
ENDOCRINE NEGATIVE: 0

## 2024-09-03 ASSESSMENT — CUP TO DISC RATIO
OS_RATIO: 95
OD_RATIO: 99

## 2024-09-03 ASSESSMENT — VISUAL ACUITY
OD_SC: NLP
METHOD: SNELLEN - LINEAR
OS_SC: HM

## 2024-09-03 ASSESSMENT — SLIT LAMP EXAM - LIDS
COMMENTS: NORMAL
COMMENTS: NORMAL

## 2024-09-03 ASSESSMENT — GONIOSCOPY
OS_SUPERIOR: 3/360
OD_SUPERIOR: 3/360

## 2024-09-03 ASSESSMENT — TONOMETRY: IOP_UNABLETOASSESS: 1

## 2024-09-10 ENCOUNTER — CLINICAL SUPPORT (OUTPATIENT)
Dept: PREADMISSION TESTING | Facility: HOSPITAL | Age: 69
End: 2024-09-10
Payer: MEDICARE

## 2024-09-10 NOTE — CPM/PAT NURSE NOTE
CPM/PAT Nurse Note      Name: Benjie Waters (Benjie Waters)  /Age: 1955/68 y.o.       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    Family History   Problem Relation Name Age of Onset    No Known Problems Mother         Allergies   Allergen Reactions    Bacitracin Unknown    Erythromycin Unknown       Prior to Admission medications    Medication Sig Start Date End Date Taking? Authorizing Provider   artificial tears, dextran-hypomel-glycerin, 0.1-0.3-0.2 % ophthalmic solution Administer 1 drop into affected eye(s) 4 times a day.    Historical Provider, MD   ascorbic acid (Vitamin C) 1,000 mg tablet Take 1 tablet (1,000 mg) by mouth once daily.    Historical Provider, MD   atorvastatin (Lipitor) 40 mg tablet Take 1 tablet (40 mg) by mouth once daily. 24   Meseret De La Torre PA-C   blood sugar diagnostic (FreeStyle Lite Strips) strip Use once daily and as needed 23   Meseret De La Torre PA-C   brimonidine (AlphaGAN) 0.2 % ophthalmic solution Administer 1 drop into both eyes 2 times a day. 9/3/24 12/2/24  Edward  HERACLIO Ingram MD   empagliflozin (Jardiance) 10 mg Take 1 tablet (10 mg) by mouth once daily. 6/11/24 6/11/25  Man Sandoval MD   fexofenadine (Allegra Allergy) 180 mg tablet Take 1 tablet (180 mg) by mouth once daily as needed. TAKE PER DIRECTED    Historical Provider, MD   flaxseed oiL 1,000 mg capsule Take 1 capsule (1,000 mg) by mouth once daily. TAKE PER DIECTED    Historical Provider, MD   losartan (Cozaar) 25 mg tablet TAKE 1 TABLET BY MOUTH EVERY DAY 8/26/24   Meseret De La Torre PA-C   metFORMIN  mg 24 hr tablet Take 2 tablets (1,000 mg) by mouth 2 times a day. 3/18/24 9/14/24  Meseret De La Torre PA-C   montelukast (Singulair) 10 mg tablet TAKE 1 TABLET BY MOUTH EVERY DAY 5/28/24   Meseret De La Torre PA-C   olopatadine (Pataday) 0.2 % ophthalmic solution Administer 1 drop into affected eye(s) once daily. PER DIRECTED 9/8/14   Historical Provider, MD   pantoprazole (ProtoNix) 40 mg EC tablet Take 1 tablet (40 mg) by mouth once daily. 8/8/24   Meseret De La Torre PA-C   prednisoLONE acetate (Pred-Forte) 1 % ophthalmic suspension APPLY 1 DROP 4 TIMES DAILY INTO SURGICAL EYE STARTING 1 DAY AFTER SURGERY 6/10/24   Panfilo Ingram MD        PAT ROS     DASI Risk Score    No data to display       Caprini DVT Assessment    No data to display       Modified Frailty Index    No data to display       CHADS2 Stroke Risk  Current as of today        N/A 3 to 100%: High Risk   2 to < 3%: Medium Risk   0 to < 2%: Low Risk     Last Change: N/A          This score determines the patient's risk of having a stroke if the patient has atrial fibrillation.        This score is not applicable to this patient. Components are not calculated.          Revised Cardiac Risk Index    No data to display       Apfel Simplified Score    No data to display       Risk Analysis Index Results This Encounter    No data found in the last 1 encounters.     Scheduled for lung wedge resection with Dr Gladys Johnson on 9/26/24.  PCP: Meseret  LINDA De La Torre LOV 3/18/24    Thoracic Surgery: Cyrus Johnson MD LOV 8/24/24 follow-up visit for lung nodule.  - CT CHEST WO IV CONTRAST; 8/15/2024   IMPRESSION:  1. The right upper lobe nodule has increased in size, now measuring  11 x 10 mm (previously 7 x 6 mm), concerning for malignancy. Further  evaluation with PET-CT is recommended.  2. Additional bilateral pulmonary nodules remain stable, follow-up CT  chest is recommended in 1 year.  3. Additional chronic and incidental findings as detailed above.    Otolaryngology: Lupillo Weinstein MD LOV 7/23/24 seen for Cerumen Impaction.    Oncology: Meseret Hope PA-C LOV 6/18/24 seen for evaluation of anemia.     Pulmonology: Omari Schmidt MD P: [phone], F: [phone]    Cardiology: Man Sandoval MD LOV 6/11/24    -Nuclear Stress Test; 7/8/24  IMPRESSION:  1. Negative myocardial perfusion study without evidence of inducible  myocardial ischemia or prior infarction.  2. The left ventricle is normal in size.  3. Normal LV wall motion with a post-stress LV EF estimated at  greater than 65%.      -ECG; 6/11/24  Normal sinus rhythm  Normal ECG    - CT CARDIAC SCORING WO IV CONTRAST; 11/27/2023   IMPRESSION:  1. Coronary artery calcium score of 0*.  2. Nonspecific ground-glass densities in the bilateral lung bases.  Total 265     Nurse Plan of Action:   Requested cardiac clearance from Dr. Sandoval via staff message on 9/10/24.   ONLY    Tiera Starr RN  Pre-Admission Testing

## 2024-09-17 ENCOUNTER — PRE-ADMISSION TESTING (OUTPATIENT)
Dept: PREADMISSION TESTING | Facility: HOSPITAL | Age: 69
End: 2024-09-17
Payer: MEDICARE

## 2024-09-17 VITALS
SYSTOLIC BLOOD PRESSURE: 120 MMHG | DIASTOLIC BLOOD PRESSURE: 74 MMHG | TEMPERATURE: 97.2 F | RESPIRATION RATE: 16 BRPM | HEART RATE: 68 BPM | WEIGHT: 154 LBS | HEIGHT: 66 IN | BODY MASS INDEX: 24.75 KG/M2

## 2024-09-17 DIAGNOSIS — R91.1 LUNG NODULE: Primary | ICD-10-CM

## 2024-09-17 LAB
ABO GROUP (TYPE) IN BLOOD: NORMAL
ANION GAP SERPL CALC-SCNC: 13 MMOL/L (ref 10–20)
ANTIBODY SCREEN: NORMAL
BUN SERPL-MCNC: 20 MG/DL (ref 6–23)
CALCIUM SERPL-MCNC: 10.4 MG/DL (ref 8.6–10.6)
CHLORIDE SERPL-SCNC: 104 MMOL/L (ref 98–107)
CO2 SERPL-SCNC: 29 MMOL/L (ref 21–32)
CREAT SERPL-MCNC: 1.26 MG/DL (ref 0.5–1.3)
EGFRCR SERPLBLD CKD-EPI 2021: 62 ML/MIN/1.73M*2
ERYTHROCYTE [DISTWIDTH] IN BLOOD BY AUTOMATED COUNT: 14.3 % (ref 11.5–14.5)
GLUCOSE SERPL-MCNC: 142 MG/DL (ref 74–99)
HCT VFR BLD AUTO: 34.6 % (ref 41–52)
HGB BLD-MCNC: 11.3 G/DL (ref 13.5–17.5)
MCH RBC QN AUTO: 32 PG (ref 26–34)
MCHC RBC AUTO-ENTMCNC: 32.7 G/DL (ref 32–36)
MCV RBC AUTO: 98 FL (ref 80–100)
NRBC BLD-RTO: 0 /100 WBCS (ref 0–0)
PLATELET # BLD AUTO: 204 X10*3/UL (ref 150–450)
POTASSIUM SERPL-SCNC: 5 MMOL/L (ref 3.5–5.3)
RBC # BLD AUTO: 3.53 X10*6/UL (ref 4.5–5.9)
RH FACTOR (ANTIGEN D): NORMAL
SODIUM SERPL-SCNC: 141 MMOL/L (ref 136–145)
WBC # BLD AUTO: 6.3 X10*3/UL (ref 4.4–11.3)

## 2024-09-17 PROCEDURE — 85027 COMPLETE CBC AUTOMATED: CPT | Performed by: NURSE PRACTITIONER

## 2024-09-17 PROCEDURE — 87081 CULTURE SCREEN ONLY: CPT | Performed by: NURSE PRACTITIONER

## 2024-09-17 PROCEDURE — 99204 OFFICE O/P NEW MOD 45 MIN: CPT | Performed by: NURSE PRACTITIONER

## 2024-09-17 PROCEDURE — 86901 BLOOD TYPING SEROLOGIC RH(D): CPT | Performed by: NURSE PRACTITIONER

## 2024-09-17 PROCEDURE — 80048 BASIC METABOLIC PNL TOTAL CA: CPT | Performed by: NURSE PRACTITIONER

## 2024-09-17 PROCEDURE — 36415 COLL VENOUS BLD VENIPUNCTURE: CPT

## 2024-09-17 RX ORDER — CHLORHEXIDINE GLUCONATE 40 MG/ML
SOLUTION TOPICAL DAILY PRN
Qty: 473 ML | Refills: 0 | Status: SHIPPED | OUTPATIENT
Start: 2024-09-17 | End: 2024-09-22

## 2024-09-17 RX ORDER — CHLORHEXIDINE GLUCONATE ORAL RINSE 1.2 MG/ML
15 SOLUTION DENTAL AS NEEDED
Qty: 473 ML | Refills: 0 | Status: SHIPPED | OUTPATIENT
Start: 2024-09-17 | End: 2024-09-19

## 2024-09-17 ASSESSMENT — LIFESTYLE VARIABLES: SMOKING_STATUS: SMOKER

## 2024-09-17 ASSESSMENT — DUKE ACTIVITY SCORE INDEX (DASI)
DASI METS SCORE: 4.4
CAN YOU HAVE SEXUAL RELATIONS: NO
CAN YOU WALK INDOORS, SUCH AS AROUND YOUR HOUSE: YES
CAN YOU DO YARD WORK LIKE RAKING LEAVES, WEEDING OR PUSHING A MOWER: NO
CAN YOU TAKE CARE OF YOURSELF (EAT, DRESS, BATHE, OR USE TOILET): NO
CAN YOU PARTICIPATE IN MODERATE RECREATIONAL ACTIVITIES LIKE GOLF, BOWLING, DANCING, DOUBLES TENNIS OR THROWING A BASEBALL OR FOOTBALL: NO
CAN YOU CLIMB A FLIGHT OF STAIRS OR WALK UP A HILL: YES
CAN YOU DO HEAVY WORK AROUND THE HOUSE LIKE SCRUBBING FLOORS OR LIFTING AND MOVING HEAVY FURNITURE: NO
CAN YOU DO MODERATE WORK AROUND THE HOUSE LIKE VACUUMING, SWEEPING FLOORS OR CARRYING GROCERIES: YES
CAN YOU WALK A BLOCK OR TWO ON LEVEL GROUND: NO
CAN YOU PARTICIPATE IN STRENOUS SPORTS LIKE SWIMMING, SINGLES TENNIS, FOOTBALL, BASKETBALL, OR SKIING: NO
CAN YOU DO LIGHT WORK AROUND THE HOUSE LIKE DUSTING OR WASHING DISHES: YES
TOTAL_SCORE: 13.45
CAN YOU RUN A SHORT DISTANCE: NO

## 2024-09-17 ASSESSMENT — ENCOUNTER SYMPTOMS
CONSTITUTIONAL NEGATIVE: 1
EYES NEGATIVE: 1
NEUROLOGICAL NEGATIVE: 1
CARDIOVASCULAR NEGATIVE: 1
GASTROINTESTINAL NEGATIVE: 1
ENDOCRINE NEGATIVE: 1
ARTHRALGIAS: 1
RESPIRATORY NEGATIVE: 1
NECK NEGATIVE: 1

## 2024-09-17 NOTE — PREPROCEDURE INSTRUCTIONS
Fasting Guidelines    Why must I stop eating and drinking near surgery time?  With sedation, food or liquid in your stomach can enter your lungs causing serious complications  Increases nausea and vomiting    When do I need to stop eating and drinking before my surgery?  Do not eat any food or drink any liquids after midnight the night before your surgery/procedure.  You may have sips of water to take medications.    Additional Instructions:     We have sent a prescription for Hibiclens soap and Peridex mouth wash to your preferred pharmacy.  If you have not already, Please  your prescription and start using as directed before surgery.  Follow the instruction sheet provided to you at your CPM/PAT appointment.    Avoid herbal supplements, multivitamins and NSAIDS (non-steroidal anti-inflammatory drugs) such as Advil, Aleve, Ibuprofen, Naproxen, Excedrin, Meloxicam or Celebrex for at least 7 days prior to surgery. May take Tylenol as needed.    Avoid tobacco and alcohol products for 24 hours prior to surgery.    CONTACT SURGEON'S OFFICE IF YOU DEVELOP:  * Fever = 100.4 F   * New respiratory symptoms (e.g. cough, shortness of breath, respiratory distress, sore throat)  * Recent loss of taste or smell  *Flu like symptoms such as headache, fatigue or gastrointestinal symptoms  * You develop any open sores, shingles, burning or painful urination   AND/OR:  * You no longer wish to have the surgery.  * Any other personal circumstances change that may lead to the need to cancel or defer this surgery.  *You were admitted to any hospital within one week of your planned procedure.    Seven/Six Days before Surgery:  Review your medication instructions, stop indicated medications    Day of Surgery:  Review your medication instructions, take indicated medications  Wear comfortable loose fitting clothing  Do not use moisturizers, creams, lotions or perfume  All jewelry and valuables should be left at home      [facility]  [phone]           Patient Information: Pre-Operative Infection Prevention Measures     Why did I have my nose, under my arms, and groin swabbed?  The purpose of the swab is to identify Staphylococcus aureus inside your nose or on your skin.  The swab was sent to the laboratory for culture.  A positive swab/culture for Staphylococcus aureus is called colonization or carriage.      What is Staphylococcus aureus?  Staphylococcus aureus, also known as “staph”, is a germ found on the skin or in the nose of healthy people.  Sometimes Staphylococcus aureus can get into the body and cause an infection.  This can be minor (such as pimples, boils, or other skin problems).  It might also be serious (such as a blood infection, pneumonia, or a surgical site infection).    What is Staphylococcus aureus colonization or carriage?  Colonization or carriage means that a person has the germ but is not sick from it.  These bacteria can be spread on the hands or when breathing or sneezing.    How is Staphylococcus aureus spread?  It is most often spread by close contact with a person or item that carries it.    What happens if my culture is positive for Staphylococcus aureus?  Your doctor/medical team will use this information to guide any antibiotic treatment which may be necessary.  Regardless of the culture results, we will clean the inside of your nose with a betadine swab just before you have your surgery.      Will I get an infection if I have Staphylococcus aureus in my nose or on my skin?  Anyone can get an infection with Staphylococcus aureus.  However, the best way to reduce your risk of infection is to follow the instructions provided to you for the use of your CHG soap and dental rinse.        Patient Information: Oral/Dental Rinse    What is oral/dental rinse?   It is a mouthwash. It is a way of cleaning the mouth with a germ-killing solution before your surgery.  The solution contains  chlorhexidine, commonly known as CHG.   It is used inside the mouth to kill a bacteria known as Staphylococcus aureus.  Let your doctor know if you are allergic to Chlorhexidine.    Why do I need to use CHG oral/dental rinse?  The CHG oral/dental rinse helps to kill a bacteria in your mouth known as Staphylococcus aureus.     This reduces the risk of infection at the surgical site.      Using your CHG oral/dental rinse  STEPS:  Use your CHG oral/dental rinse after you brush your teeth the night before (at bedtime) and the morning of your surgery.  Follow all directions on your prescription label.    Use the cap on the container to measure 15ml   Swish (gargle if you can) the mouthwash in your mouth for at least 30 seconds, (do not swallow) and spit out  After you use your CHG rinse, do not rinse your mouth with water, drink or eat.  Please refer to the prescription label for the appropriate time to resume oral intake      What side effects might I have using the CHG oral/dental rinse?  CHG rinse will stick to plaque on the teeth.  Brush and floss just before use.  Teeth brushing will help avoid staining of plaque during use.      Patient Information: Home Preoperative Antibacterial Shower      What is a home preoperative antibacterial shower?  This shower is a way of cleaning the skin with a germ-killing solution before surgery.  The solution contains chlorhexidine, commonly known as CHG.  CHG is a skin cleanser with germ-killing ability.  Let your doctor know if you are allergic to chlorhexidine.    Why do I need to take a preoperative antibacterial shower?  Skin is not sterile.  It is best to try to make your skin as free of germs as possible before surgery.  Proper cleansing with a germ-killing soap before surgery can lower the number of germs on your skin.  This helps to reduce the risk of infection at the surgical site.  Following the instructions listed below will help you prepare your skin for surgery.       How do I use the solution?  Steps:  Begin using your CHG soap 5 days before your scheduled surgery on ________________________.    First, wash and rinse your hair using the CHG soap. Keep CHG soap away from ear canals and eyes.  Rinse completely, do not condition.  Hair extensions should be removed.  Wash your face with your normal soap and rinse.    Apply the CHG solution to a clean wet washcloth.  Turn the water off or move away from the water spray to avoid premature rinsing of the CHG soap as you are applying.   Firmly lather your entire body from the neck down.  Do not use on your face.  Pay special attention to the area(s) where your incision(s) will be located unless they are on your face.  Avoid scrubbing your skin too hard.  The important point is to have the CHG soap sit on your skin for 3 minutes.    When the 3 minutes are up, turn on the water and rinse the CHG solution off your body completely.   DO NOT wash with regular soap after you have used the CHG soap solution  Pat yourself dry with a clean, freshly-laundered towel.  DO NOT apply powders, deodorants, or lotions.  Dress in clean, freshly laundered nightclothes.    Be sure to sleep with clean, freshly laundered sheets.  Be aware that CHG will cause stains on fabrics; if you wash them with bleach after use.  Rinse your washcloth and other linens that have contact with CHG completely.  Use only non-chlorine detergents to launder the items used.   The morning of surgery is the fifth day.  Repeat the above steps and dress in clean comfortable clothing     Whom should I contact if I have any questions regarding the use of CHG soap?  Call the [facility], [facility] at [phone] if you have any questions.               Preoperative Brain Exercises    What are brain exercises?  A brain exercise is any activity that engages your thinking (cognitive) skills.    What types of activities are  considered brain exercises?  Jigsaw puzzles, crossword puzzles, word jumble, memory games, word search, and many more.  Many can be found free online or on your phone via a mobile roberto.    Why should I do brain exercises before my surgery?  More recent research has shown brain exercise before surgery can lower the risk of postoperative delirium (confusion) which can be especially important for older adults.  Patients who did brain exercises for 5 to 10 hours the days before surgery, cut their risk of postoperative delirium in half up to 1 week after surgery.         [facility]    Preoperative Deep Breathing Exercises    Why it is important to do deep breathing exercises before my surgery?  Deep breathing exercises strengthen your breathing muscles.  This helps you to recover after your surgery and decreases the chance of breathing complications.      How are the deep breathing exercises done?  Sit straight with your back supported.  Breathe in deeply and slowly through your nose. Your lower rib cage should expand and your abdomen may move forward.  Hold that breath for 3 to 5 seconds.  Breathe out through pursed lips, slowly and completely.  Rest and repeat 10 times every hour while awake.  Rest longer if you become dizzy or lightheaded.         Patient and Family Education             Ways You Can Help Prevent Blood Clots             This handout explains some simple things you can do to help prevent blood clots.      Blood clots are blockages that can form in the body's veins. When a blood clot forms in your deep veins, it may be called a deep vein thrombosis, or DVT for short. Blood clots can happen in any part of the body where blood flows, but they are most common in the arms and legs. If a piece of a blood clot breaks free and travels to the lungs, it is called a pulmonary embolus (PE). A PE can be a very serious problem.         Being in the hospital or having surgery can raise your  chances of getting a blood clot because you may not be well enough to move around as much as you normally do.         Ways you can help prevent blood clots in the hospital         Wearing SCDs. SCDs stands for Sequential Compression Devices.   SCDs are special sleeves that wrap around your legs  They attach to a pump that fills them with air to gently squeeze your legs every few minutes.   This helps return the blood in your legs to your heart.   SCDs should only be taken off when walking or bathing.   SCDs may not be comfortable, but they can help save your life.               Wearing compression stockings - if your doctor orders them. These special snug fitting stockings gently squeeze your legs to help blood flow.       Walking. Walking helps move the blood in your legs.   If your doctor says it is ok, try walking the halls at least   5 times a day. Ask us to help you get up, so you don't fall.      Taking any blood thinning medicines your doctor orders.        Page 1 of 2     [facility]; 3/23   Ways you can help prevent blood clots at home       Wearing compression stockings - if your doctor orders them. ? Walking - to help move the blood in your legs.       Taking any blood thinning medicines your doctor orders.      Signs of a blood clot or PE      Tell your doctor or nurse know right away if you have of the problems listed below.    If you are at home, seek medical care right away. Call 911 for chest pain or problems breathing.               Signs of a blood clot (DVT) - such as pain,  swelling, redness or warmth in your arm or leg      Signs of a pulmonary embolism (PE) - such as chest     pain or feeling short of breath

## 2024-09-17 NOTE — H&P (VIEW-ONLY)
CPM/PAT Evaluation       Name: Benjie Waters (Benjie Waters)  /Age: 1955/68 y.o.     Visit Type:   In-Person       Chief Complaint: Lung nodule    HPI  Patient is a 68-year-old male found to have a right upper lobe lung nodule which is increasing in size. Pt is being evaluated in [facility] in anticipation of robotic assisted right upper lobe wedge resection with mediastinal lymphadenectomy on 24 with Dr. Guille Johnson  Past Medical History:   Diagnosis Date    Anemia     Aphakia, unspecified eye 10/04/2022    Aphakia    Arthritis     Cataract     Cerebral palsy (Multi)     COPD (chronic obstructive pulmonary disease) (Multi)     Coronary artery disease     Dry eye syndrome of bilateral lacrimal glands 2017    Dry eye syndrome, bilateral    GERD (gastroesophageal reflux disease)     Glaucoma     Hearing aid worn     HL (hearing loss)     Hyperlipidemia     Ischemic optic neuropathy, unspecified eye 10/04/2022    AION (anterior ischemic optic neuropathy)    Lung nodule     right    Personal history of other infectious and parasitic diseases     History of athlete's foot    Shortness of breath     Skin cancer of nose     Smoker     Tinea unguium     Mycotic toenails    Type 2 diabetes mellitus (Multi)        Past Surgical History:   Procedure Laterality Date    CATARACT EXTRACTION  2023    COLONOSCOPY      OTHER SURGICAL HISTORY  2021    Myringotomy with tube placement       Patient Sexual activity questions deferred to the physician.    Family History   Problem Relation Name Age of Onset    No Known Problems Mother         Allergies   Allergen Reactions    Bacitracin Unknown    Erythromycin Unknown       Prior to Admission medications    Medication Sig Start Date End Date Taking? Authorizing Provider   artificial tears, dextran-hypomel-glycerin, 0.1-0.3-0.2 % ophthalmic solution Administer 1 drop into affected eye(s) 4 times a day.   Yes Historical Provider, MD   ascorbic acid  (Vitamin C) 1,000 mg tablet Take 1 tablet (1,000 mg) by mouth once daily.   Yes Historical Provider, MD   atorvastatin (Lipitor) 40 mg tablet Take 1 tablet (40 mg) by mouth once daily. 8/8/24  Yes Meseret De La Torre PA-C   brimonidine (AlphaGAN) 0.2 % ophthalmic solution Administer 1 drop into both eyes 2 times a day. 9/3/24 12/2/24 Yes Panfilo Ingram MD   empagliflozin (Jardiance) 10 mg Take 1 tablet (10 mg) by mouth once daily. 6/11/24 6/11/25 Yes Man Sandoval MD   fexofenadine (Allegra Allergy) 180 mg tablet Take 1 tablet (180 mg) by mouth once daily as needed. TAKE PER DIRECTED   Yes Historical Provider, MD   flaxseed oiL 1,000 mg capsule Take 1 capsule (1,000 mg) by mouth once daily. TAKE PER DIECTED   Yes Historical Provider, MD   losartan (Cozaar) 25 mg tablet TAKE 1 TABLET BY MOUTH EVERY DAY 8/26/24  Yes Meseret De La Torre PA-C   montelukast (Singulair) 10 mg tablet TAKE 1 TABLET BY MOUTH EVERY DAY 5/28/24  Yes Meseret De La Torre PA-C   pantoprazole (ProtoNix) 40 mg EC tablet Take 1 tablet (40 mg) by mouth once daily. 8/8/24  Yes Meseret De La Torre PA-C   blood sugar diagnostic (FreeStyle Lite Strips) strip Use once daily and as needed 11/16/23   Meseret De La Torre PA-C   chlorhexidine (Hibiclens) 4 % external liquid Apply topically once daily as needed for wound care for up to 5 days. 9/17/24 9/22/24  ISABELLA De La Paz   chlorhexidine (Peridex) 0.12 % solution Use 15 mL in the mouth or throat if needed (Please rinse mouth night before surgey and morning of surgery) for up to 2 days. 9/17/24 9/19/24  ISABELLA De La Paz   metFORMIN  mg 24 hr tablet Take 2 tablets (1,000 mg) by mouth 2 times a day. 3/18/24 9/14/24  Meseret De La Torre PA-C   olopatadine (Pataday) 0.2 % ophthalmic solution Administer 1 drop into affected eye(s) once daily. PER DIRECTED 9/8/14 9/17/24  Historical Provider, MD   prednisoLONE acetate (Pred-Forte) 1 % ophthalmic suspension APPLY 1 DROP 4 TIMES DAILY INTO  SURGICAL EYE STARTING 1 DAY AFTER SURGERY 6/10/24 9/17/24  Panfilo Ingram MD        PAT ROS:   Constitutional:   neg    Neuro/Psych:   neg    Eyes:   neg    Ears:    hearing loss   hearing aides  Nose:   neg    Mouth:   neg    Throat:   neg    Neck:   neg    Cardio:   neg    Respiratory:   neg    Endocrine:   neg    GI:   neg    :   neg    Musculoskeletal:    arthralgias  Hematologic:   neg    Skin:  neg        Physical Exam  Constitutional:       Appearance: Normal appearance.   HENT:      Head: Normocephalic and atraumatic.      Nose: Nose normal.      Mouth/Throat:      Mouth: Mucous membranes are moist.   Cardiovascular:      Rate and Rhythm: Normal rate and regular rhythm.      Pulses: Normal pulses.   Pulmonary:      Effort: Pulmonary effort is normal.   Abdominal:      Palpations: Abdomen is soft.   Musculoskeletal:         General: Normal range of motion.      Cervical back: Normal range of motion.   Skin:     General: Skin is warm.   Neurological:      General: No focal deficit present.      Mental Status: He is alert and oriented to person, place, and time.   Psychiatric:         Mood and Affect: Mood normal.          PAT AIRWAY:   Airway:     Mallampati::  II    TM distance::  >3 FB    Neck ROM::  Full   Edentulous      Visit Vitals  /74   Pulse 68   Temp 36.2 °C (97.2 °F)   Resp 16       DASI Risk Score      Flowsheet Row Most Recent Value   DASI SCORE 13.45   METS Score (Will be calculated only when all the questions are answered) 4.4          Caprini DVT Assessment      Flowsheet Row Most Recent Value   DVT Score 7   Current Status Major surgery planned, lasting 2-3 hours, COPD   Age 60-75 years   BMI 30 or less          Modified Frailty Index      Flowsheet Row Most Recent Value   Modified Frailty Index Calculator .3636          CHADS2 Stroke Risk  Current as of 44 minutes ago        N/A 3 to 100%: High Risk   2 to < 3%: Medium Risk   0 to < 2%: Low Risk     Last Change: N/A          This  score determines the patient's risk of having a stroke if the patient has atrial fibrillation.        This score is not applicable to this patient. Components are not calculated.          Revised Cardiac Risk Index      Flowsheet Row Most Recent Value   Revised Cardiac Risk Calculator 1          Apfel Simplified Score      Flowsheet Row Most Recent Value   Apfel Simplified Score Calculator 1          Risk Analysis Index Results This Encounter    No data found in the last 1 encounters.       Stop Bang Score      Flowsheet Row Most Recent Value   Do you snore loudly? 0   Do you often feel tired or fatigued after your sleep? 1   Has anyone ever observed you stop breathing in your sleep? 0   Do you have or are you being treated for high blood pressure? 0   Recent BMI (Calculated) 24.9   Is BMI greater than 35 kg/m2? 0=No   Age older than 50 years old? 1=Yes   Is your neck circumference greater than 17 inches (Male) or 16 inches (Female)? 0   Gender - Male 1=Yes   STOP-BANG Total Score 3            Assessment and Plan:         Anesthesia:  The patient denies problems with anesthesia in the past such as PONV, prolonged sedation, awareness, dental damage, aspiration, cardiac arrest, difficult intubation, or unexpected hospital admissions.     Neuro:   The patient has diagnoses or significant findings on chart review or clinical presentation and evaluation significant for MRDD and Cerebral Palsy. The patient is at increased risk for postoperative delirium secondary to age 65 or older, sensory Impairment. The patient is at increased risk for perioperative stroke secondary to cardiac disease, increased age, hyperlipidemia, diabetes mellitus, general anesthesia, operative time >2.5 hours.    HEENT/Airway  No diagnoses, significant findings on chart review, clinical presentation, or evaluation. No documented or reported history of airway difficulty.     Cardiovascular  The patient is scheduled for non-cardiac surgery associated  with elevated risk. The patient has no major cardiac contraindications to non- cardiac surgery.  RCRI  The patient meets 0-1 RCRI criteria and therefore has a less than 1% risk of major adverse cardiac complications.  METS  The patient's functional capacity capacity is greater than 4 METS.  EKG  The patient has no EKG or echocardiographic changes concerning for myocardial ischemia.   Heart Failure  The patient has no known history of heart failure.  Additionally, the patient reports no symptoms of heart failure and demonstrates no signs of heart failure.  Hypertension Evaluation  The patient has no known history of hypertension and has a normal blood pressure today.  Heart Rhythm Evaluation  The patient has no history of arrhythmias.  Heart Valve Evaluation  The patient has no known history of valvular heart disease. The patient has no symptoms or physical exam findings to suggest valvular heart disease.  Cardiology Evaluation  The patient follows with cardiology, Dr. Sandoval. Patient was last seen 6-11-24. Per note,  Coronary artery disease. This patient has CT coronary calcium score of 265 when performed on 11/27/2023. EKG performed today 6/11/2024 shows sinus rhythm and is unremarkable. This patient had been on atorvastatin 20 mg daily for many years but the dose was increased to 40 mg daily several years ago. He did have a lipid panel on 3/20/2024 with cholesterol 125 LDL 69 HDL 39 triglyceride 80. He will continue present management. Chronic smoking 1 pack/day. This patient did have a chest CT on 4/18/2024 showing multiple additional pulmonary nodules along with severe coronary artery calcification.   The patient has a 30-day risk for MACE of 1 predictor, 6.0% risk for cardiac death, nonfatal myocardial infarction, and nonfatal cardiac arrest.  ADWOA score which indicates a 0.1% risk of intraoperative or 30-day postoperative MACE (major adverse cardiac event).    Pulmonary   The patient has COPD. The patient is  at increased risk of perioperative pulmonary complications secondary to thoracic surgery, ongoing tobacco abuse, advanced age greater than 60.    The patient has a stop bang score of 3, which places patient at intermediate risk for having KEVIN.    ARISCAT 43, Intermediate, 13.3% risk of in-hospital postoperative pulmonary complications  PRODIGY 16, high risk of respiratory depression episode. Patient given PI sheet for preoperative deep breathing exercises.    Hematology  No diagnoses or significant findings on chart review or clinical presentation and evaluation.  Antiplatelet management   The patient is not currently receiving antiplatelet therapy.  Anticoagulation management  The patient is not currently receiving anticoagulation therapy.    Caprini score 7, high risk of perioperative VTE.     Patient instructed to ambulate as soon as possible postoperatively to decrease thromboembolic risk. Initiate mechanical DVT prophylaxis as soon as possible and initiate chemical prophylaxis when deemed safe from a bleeding standpoint post surgery.     Transfusion Evaluation  A type and screen was obtained given the likelihood for perioperative transfusion of blood or blood products.    Gastrointestinal  The patient has GERD    Eat 10- 0,  self-perceived oropharyngeal dysphagia scale (0-40)     Genitourinary  No diagnoses or significant findings on chart review or clinical presentation and evaluation.    Renal  No renal diagnoses or significant findings on chart review or clinical presentation and evaluation. The patient has specific risk factors associated with increased risk of perioperative renal complications related to age greater than 55, male gender, diabetes mellitus, COPD.    Musculoskeletal  The patient has osteoarthritis    Endocrine  Diabetes Evaluation  The patient has a history of diabetes mellitus that currently appears controlled.  Thyroid Disease Evaluation  The patient has no history of thyroid  disease.    ID  No diagnoses or significant findings on chart review or clinical presentation and evaluation. MRSA screening obtained. Prescriptions and instructions given for Hibiclens and Peridex.    -Preoperative medication instructions were provided and reviewed with the patient.  Any additional testing or evaluation was explained to the patient.  NPO Instructions were discussed, and the patient's questions were answered prior to conclusion of this encounter. Patient verbalized understanding of preoperative instructions. After Visit Summary given.    Recent Results (from the past 168 hour(s))   Staphylococcus aureus/MRSA colonization, Culture    Collection Time: 09/17/24 11:38 AM    Specimen: Nares/Axilla/Groin; Swab   Result Value Ref Range    Staph/MRSA Screen Culture No Staphylococcus aureus isolated    CBC    Collection Time: 09/17/24 11:38 AM   Result Value Ref Range    WBC 6.3 4.4 - 11.3 x10*3/uL    nRBC 0.0 0.0 - 0.0 /100 WBCs    RBC 3.53 (L) 4.50 - 5.90 x10*6/uL    Hemoglobin 11.3 (L) 13.5 - 17.5 g/dL    Hematocrit 34.6 (L) 41.0 - 52.0 %    MCV 98 80 - 100 fL    MCH 32.0 26.0 - 34.0 pg    MCHC 32.7 32.0 - 36.0 g/dL    RDW 14.3 11.5 - 14.5 %    Platelets 204 150 - 450 x10*3/uL   Basic Metabolic Panel    Collection Time: 09/17/24 11:38 AM   Result Value Ref Range    Glucose 142 (H) 74 - 99 mg/dL    Sodium 141 136 - 145 mmol/L    Potassium 5.0 3.5 - 5.3 mmol/L    Chloride 104 98 - 107 mmol/L    Bicarbonate 29 21 - 32 mmol/L    Anion Gap 13 10 - 20 mmol/L    Urea Nitrogen 20 6 - 23 mg/dL    Creatinine 1.26 0.50 - 1.30 mg/dL    eGFR 62 >60 mL/min/1.73m*2    Calcium 10.4 8.6 - 10.6 mg/dL   Type And Screen    Collection Time: 09/17/24 11:38 AM   Result Value Ref Range    ABO TYPE A     Rh TYPE POS     ANTIBODY SCREEN NEG

## 2024-09-17 NOTE — CPM/PAT H&P
CPM/PAT Evaluation       Name: Benjie Waters (Benjie Waters)  /Age: 1955/68 y.o.     Visit Type:   In-Person       Chief Complaint: Lung nodule    HPI  Patient is a 68-year-old male found to have a right upper lobe lung nodule which is increasing in size. Pt is being evaluated in CPM in anticipation of robotic assisted right upper lobe wedge resection with mediastinal lymphadenectomy on 24 with Dr. Guille Johnson  Past Medical History:   Diagnosis Date    Anemia     Aphakia, unspecified eye 10/04/2022    Aphakia    Arthritis     Cataract     Cerebral palsy (Multi)     COPD (chronic obstructive pulmonary disease) (Multi)     Coronary artery disease     Dry eye syndrome of bilateral lacrimal glands 2017    Dry eye syndrome, bilateral    GERD (gastroesophageal reflux disease)     Glaucoma     Hearing aid worn     HL (hearing loss)     Hyperlipidemia     Ischemic optic neuropathy, unspecified eye 10/04/2022    AION (anterior ischemic optic neuropathy)    Lung nodule     right    Personal history of other infectious and parasitic diseases     History of athlete's foot    Shortness of breath     Skin cancer of nose     Smoker     Tinea unguium     Mycotic toenails    Type 2 diabetes mellitus (Multi)        Past Surgical History:   Procedure Laterality Date    CATARACT EXTRACTION  2023    COLONOSCOPY      OTHER SURGICAL HISTORY  2021    Myringotomy with tube placement       Patient Sexual activity questions deferred to the physician.    Family History   Problem Relation Name Age of Onset    No Known Problems Mother         Allergies   Allergen Reactions    Bacitracin Unknown    Erythromycin Unknown       Prior to Admission medications    Medication Sig Start Date End Date Taking? Authorizing Provider   artificial tears, dextran-hypomel-glycerin, 0.1-0.3-0.2 % ophthalmic solution Administer 1 drop into affected eye(s) 4 times a day.   Yes Historical Provider, MD   ascorbic acid  (Vitamin C) 1,000 mg tablet Take 1 tablet (1,000 mg) by mouth once daily.   Yes Historical Provider, MD   atorvastatin (Lipitor) 40 mg tablet Take 1 tablet (40 mg) by mouth once daily. 8/8/24  Yes Meseret De La Torre PA-C   brimonidine (AlphaGAN) 0.2 % ophthalmic solution Administer 1 drop into both eyes 2 times a day. 9/3/24 12/2/24 Yes Panfilo Ingram MD   empagliflozin (Jardiance) 10 mg Take 1 tablet (10 mg) by mouth once daily. 6/11/24 6/11/25 Yes Man Sandoval MD   fexofenadine (Allegra Allergy) 180 mg tablet Take 1 tablet (180 mg) by mouth once daily as needed. TAKE PER DIRECTED   Yes Historical Provider, MD   flaxseed oiL 1,000 mg capsule Take 1 capsule (1,000 mg) by mouth once daily. TAKE PER DIECTED   Yes Historical Provider, MD   losartan (Cozaar) 25 mg tablet TAKE 1 TABLET BY MOUTH EVERY DAY 8/26/24  Yes Meseret De La Torre PA-C   montelukast (Singulair) 10 mg tablet TAKE 1 TABLET BY MOUTH EVERY DAY 5/28/24  Yes Meseret De La Torre PA-C   pantoprazole (ProtoNix) 40 mg EC tablet Take 1 tablet (40 mg) by mouth once daily. 8/8/24  Yes Meseret De La Torre PA-C   blood sugar diagnostic (FreeStyle Lite Strips) strip Use once daily and as needed 11/16/23   Meseret De La Torre PA-C   chlorhexidine (Hibiclens) 4 % external liquid Apply topically once daily as needed for wound care for up to 5 days. 9/17/24 9/22/24  ISABELLA De La Paz   chlorhexidine (Peridex) 0.12 % solution Use 15 mL in the mouth or throat if needed (Please rinse mouth night before surgey and morning of surgery) for up to 2 days. 9/17/24 9/19/24  ISABELLA De La Paz   metFORMIN  mg 24 hr tablet Take 2 tablets (1,000 mg) by mouth 2 times a day. 3/18/24 9/14/24  Mseeret De La Torre PA-C   olopatadine (Pataday) 0.2 % ophthalmic solution Administer 1 drop into affected eye(s) once daily. PER DIRECTED 9/8/14 9/17/24  Historical Provider, MD   prednisoLONE acetate (Pred-Forte) 1 % ophthalmic suspension APPLY 1 DROP 4 TIMES DAILY INTO  SURGICAL EYE STARTING 1 DAY AFTER SURGERY 6/10/24 9/17/24  Panfilo Ingram MD        PAT ROS:   Constitutional:   neg    Neuro/Psych:   neg    Eyes:   neg    Ears:    hearing loss   hearing aides  Nose:   neg    Mouth:   neg    Throat:   neg    Neck:   neg    Cardio:   neg    Respiratory:   neg    Endocrine:   neg    GI:   neg    :   neg    Musculoskeletal:    arthralgias  Hematologic:   neg    Skin:  neg        Physical Exam  Constitutional:       Appearance: Normal appearance.   HENT:      Head: Normocephalic and atraumatic.      Nose: Nose normal.      Mouth/Throat:      Mouth: Mucous membranes are moist.   Cardiovascular:      Rate and Rhythm: Normal rate and regular rhythm.      Pulses: Normal pulses.   Pulmonary:      Effort: Pulmonary effort is normal.   Abdominal:      Palpations: Abdomen is soft.   Musculoskeletal:         General: Normal range of motion.      Cervical back: Normal range of motion.   Skin:     General: Skin is warm.   Neurological:      General: No focal deficit present.      Mental Status: He is alert and oriented to person, place, and time.   Psychiatric:         Mood and Affect: Mood normal.          PAT AIRWAY:   Airway:     Mallampati::  II    TM distance::  >3 FB    Neck ROM::  Full   Edentulous      Visit Vitals  /74   Pulse 68   Temp 36.2 °C (97.2 °F)   Resp 16       DASI Risk Score      Flowsheet Row Most Recent Value   DASI SCORE 13.45   METS Score (Will be calculated only when all the questions are answered) 4.4          Caprini DVT Assessment      Flowsheet Row Most Recent Value   DVT Score 7   Current Status Major surgery planned, lasting 2-3 hours, COPD   Age 60-75 years   BMI 30 or less          Modified Frailty Index      Flowsheet Row Most Recent Value   Modified Frailty Index Calculator .3636          CHADS2 Stroke Risk  Current as of 44 minutes ago        N/A 3 to 100%: High Risk   2 to < 3%: Medium Risk   0 to < 2%: Low Risk     Last Change: N/A          This  score determines the patient's risk of having a stroke if the patient has atrial fibrillation.        This score is not applicable to this patient. Components are not calculated.          Revised Cardiac Risk Index      Flowsheet Row Most Recent Value   Revised Cardiac Risk Calculator 1          Apfel Simplified Score      Flowsheet Row Most Recent Value   Apfel Simplified Score Calculator 1          Risk Analysis Index Results This Encounter    No data found in the last 1 encounters.       Stop Bang Score      Flowsheet Row Most Recent Value   Do you snore loudly? 0   Do you often feel tired or fatigued after your sleep? 1   Has anyone ever observed you stop breathing in your sleep? 0   Do you have or are you being treated for high blood pressure? 0   Recent BMI (Calculated) 24.9   Is BMI greater than 35 kg/m2? 0=No   Age older than 50 years old? 1=Yes   Is your neck circumference greater than 17 inches (Male) or 16 inches (Female)? 0   Gender - Male 1=Yes   STOP-BANG Total Score 3            Assessment and Plan:         Anesthesia:  The patient denies problems with anesthesia in the past such as PONV, prolonged sedation, awareness, dental damage, aspiration, cardiac arrest, difficult intubation, or unexpected hospital admissions.     Neuro:   The patient has diagnoses or significant findings on chart review or clinical presentation and evaluation significant for MRDD and Cerebral Palsy. The patient is at increased risk for postoperative delirium secondary to age 65 or older, sensory Impairment. The patient is at increased risk for perioperative stroke secondary to cardiac disease, increased age, hyperlipidemia, diabetes mellitus, general anesthesia, operative time >2.5 hours.    HEENT/Airway  No diagnoses, significant findings on chart review, clinical presentation, or evaluation. No documented or reported history of airway difficulty.     Cardiovascular  The patient is scheduled for non-cardiac surgery associated  with elevated risk. The patient has no major cardiac contraindications to non- cardiac surgery.  RCRI  The patient meets 0-1 RCRI criteria and therefore has a less than 1% risk of major adverse cardiac complications.  METS  The patient's functional capacity capacity is greater than 4 METS.  EKG  The patient has no EKG or echocardiographic changes concerning for myocardial ischemia.   Heart Failure  The patient has no known history of heart failure.  Additionally, the patient reports no symptoms of heart failure and demonstrates no signs of heart failure.  Hypertension Evaluation  The patient has no known history of hypertension and has a normal blood pressure today.  Heart Rhythm Evaluation  The patient has no history of arrhythmias.  Heart Valve Evaluation  The patient has no known history of valvular heart disease. The patient has no symptoms or physical exam findings to suggest valvular heart disease.  Cardiology Evaluation  The patient follows with cardiology, Dr. Sandoval. Patient was last seen 6-11-24. Per note,  Coronary artery disease. This patient has CT coronary calcium score of 265 when performed on 11/27/2023. EKG performed today 6/11/2024 shows sinus rhythm and is unremarkable. This patient had been on atorvastatin 20 mg daily for many years but the dose was increased to 40 mg daily several years ago. He did have a lipid panel on 3/20/2024 with cholesterol 125 LDL 69 HDL 39 triglyceride 80. He will continue present management. Chronic smoking 1 pack/day. This patient did have a chest CT on 4/18/2024 showing multiple additional pulmonary nodules along with severe coronary artery calcification.   The patient has a 30-day risk for MACE of 1 predictor, 6.0% risk for cardiac death, nonfatal myocardial infarction, and nonfatal cardiac arrest.  ADWOA score which indicates a 0.1% risk of intraoperative or 30-day postoperative MACE (major adverse cardiac event).    Pulmonary   The patient has COPD. The patient is  at increased risk of perioperative pulmonary complications secondary to thoracic surgery, ongoing tobacco abuse, advanced age greater than 60.    The patient has a stop bang score of 3, which places patient at intermediate risk for having KEVIN.    ARISCAT 43, Intermediate, 13.3% risk of in-hospital postoperative pulmonary complications  PRODIGY 16, high risk of respiratory depression episode. Patient given PI sheet for preoperative deep breathing exercises.    Hematology  No diagnoses or significant findings on chart review or clinical presentation and evaluation.  Antiplatelet management   The patient is not currently receiving antiplatelet therapy.  Anticoagulation management  The patient is not currently receiving anticoagulation therapy.    Caprini score 7, high risk of perioperative VTE.     Patient instructed to ambulate as soon as possible postoperatively to decrease thromboembolic risk. Initiate mechanical DVT prophylaxis as soon as possible and initiate chemical prophylaxis when deemed safe from a bleeding standpoint post surgery.     Transfusion Evaluation  A type and screen was obtained given the likelihood for perioperative transfusion of blood or blood products.    Gastrointestinal  The patient has GERD    Eat 10- 0,  self-perceived oropharyngeal dysphagia scale (0-40)     Genitourinary  No diagnoses or significant findings on chart review or clinical presentation and evaluation.    Renal  No renal diagnoses or significant findings on chart review or clinical presentation and evaluation. The patient has specific risk factors associated with increased risk of perioperative renal complications related to age greater than 55, male gender, diabetes mellitus, COPD.    Musculoskeletal  The patient has osteoarthritis    Endocrine  Diabetes Evaluation  The patient has a history of diabetes mellitus that currently appears controlled.  Thyroid Disease Evaluation  The patient has no history of thyroid  disease.    ID  No diagnoses or significant findings on chart review or clinical presentation and evaluation. MRSA screening obtained. Prescriptions and instructions given for Hibiclens and Peridex.    -Preoperative medication instructions were provided and reviewed with the patient.  Any additional testing or evaluation was explained to the patient.  NPO Instructions were discussed, and the patient's questions were answered prior to conclusion of this encounter. Patient verbalized understanding of preoperative instructions. After Visit Summary given.    Recent Results (from the past 168 hour(s))   Staphylococcus aureus/MRSA colonization, Culture    Collection Time: 09/17/24 11:38 AM    Specimen: Nares/Axilla/Groin; Swab   Result Value Ref Range    Staph/MRSA Screen Culture No Staphylococcus aureus isolated    CBC    Collection Time: 09/17/24 11:38 AM   Result Value Ref Range    WBC 6.3 4.4 - 11.3 x10*3/uL    nRBC 0.0 0.0 - 0.0 /100 WBCs    RBC 3.53 (L) 4.50 - 5.90 x10*6/uL    Hemoglobin 11.3 (L) 13.5 - 17.5 g/dL    Hematocrit 34.6 (L) 41.0 - 52.0 %    MCV 98 80 - 100 fL    MCH 32.0 26.0 - 34.0 pg    MCHC 32.7 32.0 - 36.0 g/dL    RDW 14.3 11.5 - 14.5 %    Platelets 204 150 - 450 x10*3/uL   Basic Metabolic Panel    Collection Time: 09/17/24 11:38 AM   Result Value Ref Range    Glucose 142 (H) 74 - 99 mg/dL    Sodium 141 136 - 145 mmol/L    Potassium 5.0 3.5 - 5.3 mmol/L    Chloride 104 98 - 107 mmol/L    Bicarbonate 29 21 - 32 mmol/L    Anion Gap 13 10 - 20 mmol/L    Urea Nitrogen 20 6 - 23 mg/dL    Creatinine 1.26 0.50 - 1.30 mg/dL    eGFR 62 >60 mL/min/1.73m*2    Calcium 10.4 8.6 - 10.6 mg/dL   Type And Screen    Collection Time: 09/17/24 11:38 AM   Result Value Ref Range    ABO TYPE A     Rh TYPE POS     ANTIBODY SCREEN NEG

## 2024-09-18 ENCOUNTER — TELEPHONE (OUTPATIENT)
Dept: CARDIOLOGY | Facility: CLINIC | Age: 69
End: 2024-09-18
Payer: MEDICARE

## 2024-09-18 DIAGNOSIS — E11.9 DIABETES MELLITUS TYPE 2 WITHOUT RETINOPATHY (MULTI): ICD-10-CM

## 2024-09-18 RX ORDER — METFORMIN HYDROCHLORIDE 500 MG/1
1000 TABLET, EXTENDED RELEASE ORAL 2 TIMES DAILY
Qty: 360 TABLET | Refills: 1 | Status: SHIPPED | OUTPATIENT
Start: 2024-09-18

## 2024-09-19 LAB — STAPHYLOCOCCUS SPEC CULT: NORMAL

## 2024-09-19 RX ORDER — ASPIRIN 81 MG/1
81 TABLET ORAL DAILY
COMMUNITY

## 2024-09-25 ENCOUNTER — ANESTHESIA EVENT (OUTPATIENT)
Dept: OPERATING ROOM | Facility: HOSPITAL | Age: 69
End: 2024-09-25
Payer: MEDICARE

## 2024-09-25 NOTE — PROGRESS NOTES
Pharmacy Medication History Review    Benjie Waters is a 68 y.o. male who is planned to be admitted for Lung nodule. Pharmacy called the patient prior to their scheduled procedure and reviewed the patient's [id number] medications for accuracy.    Medications ADDED:  none  Medications CHANGED:  none  Medications REMOVED:   none    Please review updated prior to admission medication list and comments regarding how patient may be taking medications differently by going to Admission tab --> Admission Orders --> Admit Orders / Review prior to admission medications.     Preferred pharmacy and allergies to be confirmed with patient by nursing the day of procedure.     Sources used to complete the med history include spoke with patient's sister (vivienne), surescripts, oarrs, care everywhere    Below are additional concerns with the patient's PTA list.  none    Jocy Brown    [facility]  Please reach out via Secure Chat for questions

## 2024-09-26 ENCOUNTER — ANESTHESIA (OUTPATIENT)
Dept: OPERATING ROOM | Facility: HOSPITAL | Age: 69
End: 2024-09-26
Payer: MEDICARE

## 2024-09-26 ENCOUNTER — HOSPITAL ENCOUNTER (INPATIENT)
Facility: HOSPITAL | Age: 69
LOS: 1 days | Discharge: HOME | End: 2024-09-27
Attending: THORACIC SURGERY (CARDIOTHORACIC VASCULAR SURGERY) | Admitting: THORACIC SURGERY (CARDIOTHORACIC VASCULAR SURGERY)
Payer: MEDICARE

## 2024-09-26 ENCOUNTER — APPOINTMENT (OUTPATIENT)
Dept: RADIOLOGY | Facility: HOSPITAL | Age: 69
End: 2024-09-26
Payer: MEDICARE

## 2024-09-26 ENCOUNTER — APPOINTMENT (OUTPATIENT)
Dept: CARDIOLOGY | Facility: HOSPITAL | Age: 69
End: 2024-09-26
Payer: MEDICARE

## 2024-09-26 DIAGNOSIS — R91.1 LUNG NODULE: Primary | ICD-10-CM

## 2024-09-26 LAB
ABO GROUP (TYPE) IN BLOOD: NORMAL
GLUCOSE BLD MANUAL STRIP-MCNC: 124 MG/DL (ref 74–99)
GLUCOSE BLD MANUAL STRIP-MCNC: 199 MG/DL (ref 74–99)
GLUCOSE BLD MANUAL STRIP-MCNC: 202 MG/DL (ref 74–99)
GLUCOSE BLD MANUAL STRIP-MCNC: 203 MG/DL (ref 74–99)
GLUCOSE BLD MANUAL STRIP-MCNC: 216 MG/DL (ref 74–99)
RH FACTOR (ANTIGEN D): NORMAL

## 2024-09-26 PROCEDURE — 0BBC4ZZ EXCISION OF RIGHT UPPER LUNG LOBE, PERCUTANEOUS ENDOSCOPIC APPROACH: ICD-10-PCS | Performed by: THORACIC SURGERY (CARDIOTHORACIC VASCULAR SURGERY)

## 2024-09-26 PROCEDURE — 1200000002 HC GENERAL ROOM WITH TELEMETRY DAILY

## 2024-09-26 PROCEDURE — 88307 TISSUE EXAM BY PATHOLOGIST: CPT | Performed by: STUDENT IN AN ORGANIZED HEALTH CARE EDUCATION/TRAINING PROGRAM

## 2024-09-26 PROCEDURE — 93005 ELECTROCARDIOGRAM TRACING: CPT

## 2024-09-26 PROCEDURE — 3600000017 HC OR TIME - EACH INCREMENTAL 1 MINUTE - PROCEDURE LEVEL SIX: Performed by: THORACIC SURGERY (CARDIOTHORACIC VASCULAR SURGERY)

## 2024-09-26 PROCEDURE — 94640 AIRWAY INHALATION TREATMENT: CPT

## 2024-09-26 PROCEDURE — 3600000018 HC OR TIME - INITIAL BASE CHARGE - PROCEDURE LEVEL SIX: Performed by: THORACIC SURGERY (CARDIOTHORACIC VASCULAR SURGERY)

## 2024-09-26 PROCEDURE — 2500000004 HC RX 250 GENERAL PHARMACY W/ HCPCS (ALT 636 FOR OP/ED): Performed by: STUDENT IN AN ORGANIZED HEALTH CARE EDUCATION/TRAINING PROGRAM

## 2024-09-26 PROCEDURE — 07T74ZZ RESECTION OF THORAX LYMPHATIC, PERCUTANEOUS ENDOSCOPIC APPROACH: ICD-10-PCS | Performed by: THORACIC SURGERY (CARDIOTHORACIC VASCULAR SURGERY)

## 2024-09-26 PROCEDURE — 32674 THORACOSCOPY LYMPH NODE EXC: CPT | Performed by: PHYSICIAN ASSISTANT

## 2024-09-26 PROCEDURE — A32666 PR THORACOSCOPY W/THERA WEDGE RESEXN INITIAL UNILAT: Performed by: ANESTHESIOLOGY

## 2024-09-26 PROCEDURE — 2720000007 HC OR 272 NO HCPCS: Performed by: THORACIC SURGERY (CARDIOTHORACIC VASCULAR SURGERY)

## 2024-09-26 PROCEDURE — 2500000002 HC RX 250 W HCPCS SELF ADMINISTERED DRUGS (ALT 637 FOR MEDICARE OP, ALT 636 FOR OP/ED): Performed by: STUDENT IN AN ORGANIZED HEALTH CARE EDUCATION/TRAINING PROGRAM

## 2024-09-26 PROCEDURE — 2500000001 HC RX 250 WO HCPCS SELF ADMINISTERED DRUGS (ALT 637 FOR MEDICARE OP): Performed by: STUDENT IN AN ORGANIZED HEALTH CARE EDUCATION/TRAINING PROGRAM

## 2024-09-26 PROCEDURE — 81458 SO GSAP DNA CPY NMBR&MCRSTL: CPT | Performed by: THORACIC SURGERY (CARDIOTHORACIC VASCULAR SURGERY)

## 2024-09-26 PROCEDURE — 36415 COLL VENOUS BLD VENIPUNCTURE: CPT | Performed by: ANESTHESIOLOGY

## 2024-09-26 PROCEDURE — 2500000004 HC RX 250 GENERAL PHARMACY W/ HCPCS (ALT 636 FOR OP/ED): Mod: SE

## 2024-09-26 PROCEDURE — 32666 THORACOSCOPY W/WEDGE RESECT: CPT | Performed by: THORACIC SURGERY (CARDIOTHORACIC VASCULAR SURGERY)

## 2024-09-26 PROCEDURE — 82947 ASSAY GLUCOSE BLOOD QUANT: CPT

## 2024-09-26 PROCEDURE — 8E0W4CZ ROBOTIC ASSISTED PROCEDURE OF TRUNK REGION, PERCUTANEOUS ENDOSCOPIC APPROACH: ICD-10-PCS | Performed by: THORACIC SURGERY (CARDIOTHORACIC VASCULAR SURGERY)

## 2024-09-26 PROCEDURE — 71045 X-RAY EXAM CHEST 1 VIEW: CPT | Performed by: RADIOLOGY

## 2024-09-26 PROCEDURE — 71045 X-RAY EXAM CHEST 1 VIEW: CPT

## 2024-09-26 PROCEDURE — 2780000003 HC OR 278 NO HCPCS: Performed by: THORACIC SURGERY (CARDIOTHORACIC VASCULAR SURGERY)

## 2024-09-26 PROCEDURE — 3700000002 HC GENERAL ANESTHESIA TIME - EACH INCREMENTAL 1 MINUTE: Performed by: THORACIC SURGERY (CARDIOTHORACIC VASCULAR SURGERY)

## 2024-09-26 PROCEDURE — 96372 THER/PROPH/DIAG INJ SC/IM: CPT

## 2024-09-26 PROCEDURE — 2500000005 HC RX 250 GENERAL PHARMACY W/O HCPCS: Mod: SE

## 2024-09-26 PROCEDURE — 88305 TISSUE EXAM BY PATHOLOGIST: CPT | Performed by: STUDENT IN AN ORGANIZED HEALTH CARE EDUCATION/TRAINING PROGRAM

## 2024-09-26 PROCEDURE — 88381 MICRODISSECTION MANUAL: CPT | Performed by: STUDENT IN AN ORGANIZED HEALTH CARE EDUCATION/TRAINING PROGRAM

## 2024-09-26 PROCEDURE — 3700000001 HC GENERAL ANESTHESIA TIME - INITIAL BASE CHARGE: Performed by: THORACIC SURGERY (CARDIOTHORACIC VASCULAR SURGERY)

## 2024-09-26 PROCEDURE — 88342 IMHCHEM/IMCYTCHM 1ST ANTB: CPT | Performed by: STUDENT IN AN ORGANIZED HEALTH CARE EDUCATION/TRAINING PROGRAM

## 2024-09-26 PROCEDURE — 32674 THORACOSCOPY LYMPH NODE EXC: CPT | Performed by: THORACIC SURGERY (CARDIOTHORACIC VASCULAR SURGERY)

## 2024-09-26 PROCEDURE — 32667 THORACOSCOPY W/W RESECT ADDL: CPT | Performed by: THORACIC SURGERY (CARDIOTHORACIC VASCULAR SURGERY)

## 2024-09-26 PROCEDURE — 88307 TISSUE EXAM BY PATHOLOGIST: CPT | Mod: TC,SUR | Performed by: THORACIC SURGERY (CARDIOTHORACIC VASCULAR SURGERY)

## 2024-09-26 PROCEDURE — 32666 THORACOSCOPY W/WEDGE RESECT: CPT | Performed by: PHYSICIAN ASSISTANT

## 2024-09-26 PROCEDURE — 7100000002 HC RECOVERY ROOM TIME - EACH INCREMENTAL 1 MINUTE: Performed by: THORACIC SURGERY (CARDIOTHORACIC VASCULAR SURGERY)

## 2024-09-26 PROCEDURE — A32666 PR THORACOSCOPY W/THERA WEDGE RESEXN INITIAL UNILAT: Performed by: NURSE ANESTHETIST, CERTIFIED REGISTERED

## 2024-09-26 PROCEDURE — 0BJ08ZZ INSPECTION OF TRACHEOBRONCHIAL TREE, VIA NATURAL OR ARTIFICIAL OPENING ENDOSCOPIC: ICD-10-PCS | Performed by: THORACIC SURGERY (CARDIOTHORACIC VASCULAR SURGERY)

## 2024-09-26 PROCEDURE — 88341 IMHCHEM/IMCYTCHM EA ADD ANTB: CPT | Performed by: STUDENT IN AN ORGANIZED HEALTH CARE EDUCATION/TRAINING PROGRAM

## 2024-09-26 PROCEDURE — 2500000005 HC RX 250 GENERAL PHARMACY W/O HCPCS: Mod: SE | Performed by: THORACIC SURGERY (CARDIOTHORACIC VASCULAR SURGERY)

## 2024-09-26 PROCEDURE — 7100000001 HC RECOVERY ROOM TIME - INITIAL BASE CHARGE: Performed by: THORACIC SURGERY (CARDIOTHORACIC VASCULAR SURGERY)

## 2024-09-26 PROCEDURE — G0452 MOLECULAR PATHOLOGY INTERPR: HCPCS | Performed by: THORACIC SURGERY (CARDIOTHORACIC VASCULAR SURGERY)

## 2024-09-26 PROCEDURE — 88331 PATH CONSLTJ SURG 1 BLK 1SPC: CPT | Mod: TC,SUR | Performed by: STUDENT IN AN ORGANIZED HEALTH CARE EDUCATION/TRAINING PROGRAM

## 2024-09-26 PROCEDURE — 32667 THORACOSCOPY W/W RESECT ADDL: CPT | Performed by: PHYSICIAN ASSISTANT

## 2024-09-26 RX ORDER — PHENYLEPHRINE 10 MG/250 ML(40 MCG/ML)IN 0.9 % SOD.CHLORIDE INTRAVENOUS
CONTINUOUS PRN
Status: DISCONTINUED | OUTPATIENT
Start: 2024-09-26 | End: 2024-09-26

## 2024-09-26 RX ORDER — PROPOFOL 10 MG/ML
INJECTION, EMULSION INTRAVENOUS
Status: COMPLETED
Start: 2024-09-26 | End: 2024-09-26

## 2024-09-26 RX ORDER — ARTIFICIAL TEARS 1; 2; 3 MG/ML; MG/ML; MG/ML
1 SOLUTION/ DROPS OPHTHALMIC 3 TIMES DAILY PRN
Status: DISCONTINUED | OUTPATIENT
Start: 2024-09-26 | End: 2024-09-26

## 2024-09-26 RX ORDER — LIDOCAINE HCL/PF 100 MG/5ML
SYRINGE (ML) INTRAVENOUS AS NEEDED
Status: DISCONTINUED | OUTPATIENT
Start: 2024-09-26 | End: 2024-09-26

## 2024-09-26 RX ORDER — OXYCODONE HYDROCHLORIDE 5 MG/1
5 TABLET ORAL EVERY 6 HOURS PRN
Status: DISCONTINUED | OUTPATIENT
Start: 2024-09-26 | End: 2024-09-27 | Stop reason: HOSPADM

## 2024-09-26 RX ORDER — ONDANSETRON HYDROCHLORIDE 2 MG/ML
4 INJECTION, SOLUTION INTRAVENOUS ONCE AS NEEDED
Status: DISCONTINUED | OUTPATIENT
Start: 2024-09-26 | End: 2024-09-26 | Stop reason: HOSPADM

## 2024-09-26 RX ORDER — HYDROMORPHONE HYDROCHLORIDE 1 MG/ML
0.2 INJECTION, SOLUTION INTRAMUSCULAR; INTRAVENOUS; SUBCUTANEOUS EVERY 5 MIN PRN
Status: DISCONTINUED | OUTPATIENT
Start: 2024-09-26 | End: 2024-09-26 | Stop reason: HOSPADM

## 2024-09-26 RX ORDER — SEVOFLURANE 250 ML/250ML
LIQUID RESPIRATORY (INHALATION)
Status: DISCONTINUED
Start: 2024-09-26 | End: 2024-09-27 | Stop reason: HOSPADM

## 2024-09-26 RX ORDER — FENTANYL CITRATE 50 UG/ML
INJECTION, SOLUTION INTRAMUSCULAR; INTRAVENOUS AS NEEDED
Status: DISCONTINUED | OUTPATIENT
Start: 2024-09-26 | End: 2024-09-26

## 2024-09-26 RX ORDER — SODIUM CHLORIDE, SODIUM LACTATE, POTASSIUM CHLORIDE, CALCIUM CHLORIDE 600; 310; 30; 20 MG/100ML; MG/100ML; MG/100ML; MG/100ML
INJECTION, SOLUTION INTRAVENOUS
Status: COMPLETED
Start: 2024-09-26 | End: 2024-09-27

## 2024-09-26 RX ORDER — ACETAMINOPHEN 325 MG/1
975 TABLET ORAL EVERY 8 HOURS
Status: DISCONTINUED | OUTPATIENT
Start: 2024-09-26 | End: 2024-09-27 | Stop reason: HOSPADM

## 2024-09-26 RX ORDER — MIDAZOLAM HYDROCHLORIDE 1 MG/ML
INJECTION INTRAMUSCULAR; INTRAVENOUS
Status: DISCONTINUED
Start: 2024-09-26 | End: 2024-09-27 | Stop reason: HOSPADM

## 2024-09-26 RX ORDER — ONDANSETRON HYDROCHLORIDE 2 MG/ML
INJECTION, SOLUTION INTRAVENOUS AS NEEDED
Status: DISCONTINUED | OUTPATIENT
Start: 2024-09-26 | End: 2024-09-26

## 2024-09-26 RX ORDER — ASCORBIC ACID 500 MG
1000 TABLET ORAL DAILY
Status: DISCONTINUED | OUTPATIENT
Start: 2024-09-26 | End: 2024-09-27 | Stop reason: HOSPADM

## 2024-09-26 RX ORDER — DEXTROSE 50 % IN WATER (D50W) INTRAVENOUS SYRINGE
25
Status: DISCONTINUED | OUTPATIENT
Start: 2024-09-26 | End: 2024-09-27 | Stop reason: HOSPADM

## 2024-09-26 RX ORDER — PHENYLEPHRINE HYDROCHLORIDE 10 MG/ML
INJECTION INTRAVENOUS AS NEEDED
Status: DISCONTINUED | OUTPATIENT
Start: 2024-09-26 | End: 2024-09-26

## 2024-09-26 RX ORDER — DIPHENHYDRAMINE HYDROCHLORIDE 50 MG/ML
25 INJECTION INTRAMUSCULAR; INTRAVENOUS ONCE AS NEEDED
Status: DISCONTINUED | OUTPATIENT
Start: 2024-09-26 | End: 2024-09-26 | Stop reason: HOSPADM

## 2024-09-26 RX ORDER — CETIRIZINE HYDROCHLORIDE 10 MG/1
10 TABLET ORAL DAILY
Status: DISCONTINUED | OUTPATIENT
Start: 2024-09-26 | End: 2024-09-27 | Stop reason: HOSPADM

## 2024-09-26 RX ORDER — CEFAZOLIN 1 G/1
INJECTION, POWDER, FOR SOLUTION INTRAVENOUS
Status: COMPLETED
Start: 2024-09-26 | End: 2024-09-26

## 2024-09-26 RX ORDER — ROCURONIUM BROMIDE 10 MG/ML
INJECTION, SOLUTION INTRAVENOUS AS NEEDED
Status: DISCONTINUED | OUTPATIENT
Start: 2024-09-26 | End: 2024-09-26

## 2024-09-26 RX ORDER — CEFAZOLIN SODIUM 2 G/100ML
2 INJECTION, SOLUTION INTRAVENOUS EVERY 8 HOURS
Status: COMPLETED | OUTPATIENT
Start: 2024-09-26 | End: 2024-09-27

## 2024-09-26 RX ORDER — CEFAZOLIN 1 G/1
INJECTION, POWDER, FOR SOLUTION INTRAVENOUS
Status: DISCONTINUED
Start: 2024-09-26 | End: 2024-09-27 | Stop reason: HOSPADM

## 2024-09-26 RX ORDER — ALBUTEROL SULFATE 0.83 MG/ML
2.5 SOLUTION RESPIRATORY (INHALATION)
Status: DISCONTINUED | OUTPATIENT
Start: 2024-09-26 | End: 2024-09-26

## 2024-09-26 RX ORDER — PROPOFOL 10 MG/ML
INJECTION, EMULSION INTRAVENOUS AS NEEDED
Status: DISCONTINUED | OUTPATIENT
Start: 2024-09-26 | End: 2024-09-26

## 2024-09-26 RX ORDER — DEXTROSE 50 % IN WATER (D50W) INTRAVENOUS SYRINGE
12.5
Status: DISCONTINUED | OUTPATIENT
Start: 2024-09-26 | End: 2024-09-27 | Stop reason: HOSPADM

## 2024-09-26 RX ORDER — SODIUM CHLORIDE 0.9 G/100ML
IRRIGANT IRRIGATION AS NEEDED
Status: DISCONTINUED | OUTPATIENT
Start: 2024-09-26 | End: 2024-09-26 | Stop reason: HOSPADM

## 2024-09-26 RX ORDER — ALBUTEROL SULFATE 0.83 MG/ML
2.5 SOLUTION RESPIRATORY (INHALATION) 3 TIMES DAILY
Status: DISCONTINUED | OUTPATIENT
Start: 2024-09-26 | End: 2024-09-27 | Stop reason: HOSPADM

## 2024-09-26 RX ORDER — INSULIN LISPRO 100 [IU]/ML
0-15 INJECTION, SOLUTION INTRAVENOUS; SUBCUTANEOUS
Status: DISCONTINUED | OUTPATIENT
Start: 2024-09-26 | End: 2024-09-27 | Stop reason: HOSPADM

## 2024-09-26 RX ORDER — CEFAZOLIN 1 G/1
INJECTION, POWDER, FOR SOLUTION INTRAVENOUS AS NEEDED
Status: DISCONTINUED | OUTPATIENT
Start: 2024-09-26 | End: 2024-09-26

## 2024-09-26 RX ORDER — MONTELUKAST SODIUM 10 MG/1
10 TABLET ORAL DAILY
Status: DISCONTINUED | OUTPATIENT
Start: 2024-09-26 | End: 2024-09-27 | Stop reason: HOSPADM

## 2024-09-26 RX ORDER — ONDANSETRON 4 MG/1
4 TABLET, ORALLY DISINTEGRATING ORAL EVERY 8 HOURS PRN
Status: DISCONTINUED | OUTPATIENT
Start: 2024-09-26 | End: 2024-09-27 | Stop reason: HOSPADM

## 2024-09-26 RX ORDER — ONDANSETRON HYDROCHLORIDE 2 MG/ML
INJECTION, SOLUTION INTRAVENOUS
Status: COMPLETED
Start: 2024-09-26 | End: 2024-09-26

## 2024-09-26 RX ORDER — ROCURONIUM BROMIDE 10 MG/ML
INJECTION, SOLUTION INTRAVENOUS
Status: COMPLETED
Start: 2024-09-26 | End: 2024-09-26

## 2024-09-26 RX ORDER — PANTOPRAZOLE SODIUM 40 MG/1
40 TABLET, DELAYED RELEASE ORAL DAILY
Status: DISCONTINUED | OUTPATIENT
Start: 2024-09-26 | End: 2024-09-27 | Stop reason: HOSPADM

## 2024-09-26 RX ORDER — LIDOCAINE HCL/PF 100 MG/5ML
SYRINGE (ML) INTRAVENOUS
Status: COMPLETED
Start: 2024-09-26 | End: 2024-09-26

## 2024-09-26 RX ORDER — HYDROMORPHONE HYDROCHLORIDE 1 MG/ML
0.5 INJECTION, SOLUTION INTRAMUSCULAR; INTRAVENOUS; SUBCUTANEOUS EVERY 5 MIN PRN
Status: DISCONTINUED | OUTPATIENT
Start: 2024-09-26 | End: 2024-09-26 | Stop reason: HOSPADM

## 2024-09-26 RX ORDER — SODIUM CHLORIDE, SODIUM LACTATE, POTASSIUM CHLORIDE, CALCIUM CHLORIDE 600; 310; 30; 20 MG/100ML; MG/100ML; MG/100ML; MG/100ML
100 INJECTION, SOLUTION INTRAVENOUS CONTINUOUS
Status: DISCONTINUED | OUTPATIENT
Start: 2024-09-26 | End: 2024-09-26 | Stop reason: HOSPADM

## 2024-09-26 RX ORDER — HEPARIN SODIUM 5000 [USP'U]/ML
INJECTION, SOLUTION INTRAVENOUS; SUBCUTANEOUS AS NEEDED
Status: DISCONTINUED | OUTPATIENT
Start: 2024-09-26 | End: 2024-09-26

## 2024-09-26 RX ORDER — LIDOCAINE HYDROCHLORIDE 10 MG/ML
0.1 INJECTION, SOLUTION INFILTRATION; PERINEURAL ONCE
Status: DISCONTINUED | OUTPATIENT
Start: 2024-09-26 | End: 2024-09-26 | Stop reason: HOSPADM

## 2024-09-26 RX ORDER — FENTANYL CITRATE 50 UG/ML
INJECTION, SOLUTION INTRAMUSCULAR; INTRAVENOUS
Status: COMPLETED
Start: 2024-09-26 | End: 2024-09-26

## 2024-09-26 RX ORDER — LABETALOL HYDROCHLORIDE 5 MG/ML
5 INJECTION, SOLUTION INTRAVENOUS ONCE AS NEEDED
Status: DISCONTINUED | OUTPATIENT
Start: 2024-09-26 | End: 2024-09-26 | Stop reason: HOSPADM

## 2024-09-26 RX ORDER — BRIMONIDINE TARTRATE 2 MG/ML
1 SOLUTION/ DROPS OPHTHALMIC 2 TIMES DAILY
Status: DISCONTINUED | OUTPATIENT
Start: 2024-09-26 | End: 2024-09-27 | Stop reason: HOSPADM

## 2024-09-26 RX ORDER — ALBUTEROL SULFATE 0.83 MG/ML
2.5 SOLUTION RESPIRATORY (INHALATION) ONCE AS NEEDED
Status: DISCONTINUED | OUTPATIENT
Start: 2024-09-26 | End: 2024-09-26 | Stop reason: HOSPADM

## 2024-09-26 RX ORDER — ASPIRIN 81 MG/1
81 TABLET ORAL DAILY
Status: DISCONTINUED | OUTPATIENT
Start: 2024-09-27 | End: 2024-09-27 | Stop reason: HOSPADM

## 2024-09-26 RX ORDER — SODIUM CHLORIDE, SODIUM LACTATE, POTASSIUM CHLORIDE, CALCIUM CHLORIDE 600; 310; 30; 20 MG/100ML; MG/100ML; MG/100ML; MG/100ML
INJECTION, SOLUTION INTRAVENOUS
Status: COMPLETED
Start: 2024-09-26 | End: 2024-09-26

## 2024-09-26 RX ORDER — HEPARIN SODIUM 5000 [USP'U]/ML
5000 INJECTION, SOLUTION INTRAVENOUS; SUBCUTANEOUS EVERY 8 HOURS SCHEDULED
Status: DISCONTINUED | OUTPATIENT
Start: 2024-09-26 | End: 2024-09-27 | Stop reason: HOSPADM

## 2024-09-26 RX ORDER — ONDANSETRON HYDROCHLORIDE 2 MG/ML
4 INJECTION, SOLUTION INTRAVENOUS EVERY 8 HOURS PRN
Status: DISCONTINUED | OUTPATIENT
Start: 2024-09-26 | End: 2024-09-27 | Stop reason: HOSPADM

## 2024-09-26 RX ORDER — MIDAZOLAM HYDROCHLORIDE 1 MG/ML
INJECTION, SOLUTION INTRAMUSCULAR; INTRAVENOUS CONTINUOUS PRN
Status: DISCONTINUED | OUTPATIENT
Start: 2024-09-26 | End: 2024-09-26

## 2024-09-26 RX ORDER — PHENYLEPHRINE HCL IN 0.9% NACL 0.4MG/10ML
SYRINGE (ML) INTRAVENOUS AS NEEDED
Status: DISCONTINUED | OUTPATIENT
Start: 2024-09-26 | End: 2024-09-26

## 2024-09-26 RX ORDER — OXYCODONE HYDROCHLORIDE 5 MG/1
5 TABLET ORAL EVERY 4 HOURS PRN
Status: DISCONTINUED | OUTPATIENT
Start: 2024-09-26 | End: 2024-09-26 | Stop reason: HOSPADM

## 2024-09-26 RX ORDER — BUPIVACAINE HCL/EPINEPHRINE 0.25-.0005
VIAL (ML) INJECTION AS NEEDED
Status: DISCONTINUED | OUTPATIENT
Start: 2024-09-26 | End: 2024-09-26 | Stop reason: HOSPADM

## 2024-09-26 RX ORDER — AMOXICILLIN 250 MG
2 CAPSULE ORAL 2 TIMES DAILY
Status: DISCONTINUED | OUTPATIENT
Start: 2024-09-26 | End: 2024-09-27 | Stop reason: HOSPADM

## 2024-09-26 RX ORDER — ATORVASTATIN CALCIUM 40 MG/1
40 TABLET, FILM COATED ORAL DAILY
Status: DISCONTINUED | OUTPATIENT
Start: 2024-09-26 | End: 2024-09-27 | Stop reason: HOSPADM

## 2024-09-26 RX ORDER — SODIUM CHLORIDE, SODIUM LACTATE, POTASSIUM CHLORIDE, CALCIUM CHLORIDE 600; 310; 30; 20 MG/100ML; MG/100ML; MG/100ML; MG/100ML
INJECTION, SOLUTION INTRAVENOUS CONTINUOUS PRN
Status: DISCONTINUED | OUTPATIENT
Start: 2024-09-26 | End: 2024-09-26

## 2024-09-26 RX ORDER — PHENYLEPHRINE HCL IN 0.9% NACL 0.4MG/10ML
SYRINGE (ML) INTRAVENOUS
Status: COMPLETED
Start: 2024-09-26 | End: 2024-09-26

## 2024-09-26 SDOH — ECONOMIC STABILITY: FOOD INSECURITY
WITHIN THE PAST 12 MONTHS, YOU WORRIED THAT YOUR FOOD WOULD RUN OUT BEFORE YOU GOT THE MONEY TO BUY MORE.: PATIENT UNABLE TO ANSWER

## 2024-09-26 SDOH — SOCIAL STABILITY: SOCIAL INSECURITY: ABUSE: ADULT

## 2024-09-26 SDOH — ECONOMIC STABILITY: HOUSING INSECURITY: IN THE LAST 12 MONTHS, WAS THERE A TIME WHEN YOU WERE NOT ABLE TO PAY THE MORTGAGE OR RENT ON TIME?: NO

## 2024-09-26 SDOH — SOCIAL STABILITY: SOCIAL INSECURITY: HAVE YOU HAD ANY THOUGHTS OF HARMING ANYONE ELSE?: UNABLE TO ASSESS

## 2024-09-26 SDOH — ECONOMIC STABILITY: FOOD INSECURITY
WITHIN THE PAST 12 MONTHS, THE FOOD YOU BOUGHT JUST DIDN'T LAST AND YOU DIDN'T HAVE MONEY TO GET MORE.: PATIENT UNABLE TO ANSWER

## 2024-09-26 SDOH — SOCIAL STABILITY: SOCIAL INSECURITY
WITHIN THE LAST YEAR, HAVE TO BEEN RAPED OR FORCED TO HAVE ANY KIND OF SEXUAL ACTIVITY BY YOUR PARTNER OR EX-PARTNER?: PATIENT UNABLE TO ANSWER

## 2024-09-26 SDOH — ECONOMIC STABILITY: HOUSING INSECURITY: AT ANY TIME IN THE PAST 12 MONTHS, WERE YOU HOMELESS OR LIVING IN A SHELTER (INCLUDING NOW)?: NO

## 2024-09-26 SDOH — ECONOMIC STABILITY: INCOME INSECURITY: IN THE PAST 12 MONTHS HAS THE ELECTRIC, GAS, OIL, OR WATER COMPANY THREATENED TO SHUT OFF SERVICES IN YOUR HOME?: NO

## 2024-09-26 SDOH — SOCIAL STABILITY: SOCIAL INSECURITY: DO YOU FEEL UNSAFE GOING BACK TO THE PLACE WHERE YOU ARE LIVING?: UNABLE TO ASSESS

## 2024-09-26 SDOH — SOCIAL STABILITY: SOCIAL INSECURITY
WITHIN THE LAST YEAR, HAVE YOU BEEN HUMILIATED OR EMOTIONALLY ABUSED IN OTHER WAYS BY YOUR PARTNER OR EX-PARTNER?: PATIENT UNABLE TO ANSWER

## 2024-09-26 SDOH — ECONOMIC STABILITY: INCOME INSECURITY: HOW HARD IS IT FOR YOU TO PAY FOR THE VERY BASICS LIKE FOOD, HOUSING, MEDICAL CARE, AND HEATING?: NOT HARD AT ALL

## 2024-09-26 SDOH — SOCIAL STABILITY: SOCIAL INSECURITY: DOES ANYONE TRY TO KEEP YOU FROM HAVING/CONTACTING OTHER FRIENDS OR DOING THINGS OUTSIDE YOUR HOME?: UNABLE TO ASSESS

## 2024-09-26 SDOH — SOCIAL STABILITY: SOCIAL INSECURITY: WERE YOU ABLE TO COMPLETE ALL THE BEHAVIORAL HEALTH SCREENINGS?: NO

## 2024-09-26 SDOH — SOCIAL STABILITY: SOCIAL INSECURITY
WITHIN THE LAST YEAR, HAVE YOU BEEN KICKED, HIT, SLAPPED, OR OTHERWISE PHYSICALLY HURT BY YOUR PARTNER OR EX-PARTNER?: PATIENT UNABLE TO ANSWER

## 2024-09-26 SDOH — SOCIAL STABILITY: SOCIAL INSECURITY: WITHIN THE LAST YEAR, HAVE YOU BEEN AFRAID OF YOUR PARTNER OR EX-PARTNER?: PATIENT UNABLE TO ANSWER

## 2024-09-26 SDOH — SOCIAL STABILITY: SOCIAL INSECURITY: HAS ANYONE EVER THREATENED TO HURT YOUR FAMILY OR YOUR PETS?: UNABLE TO ASSESS

## 2024-09-26 SDOH — ECONOMIC STABILITY: HOUSING INSECURITY: IN THE PAST 12 MONTHS, HOW MANY TIMES HAVE YOU MOVED WHERE YOU WERE LIVING?: 0

## 2024-09-26 SDOH — ECONOMIC STABILITY: TRANSPORTATION INSECURITY: IN THE PAST 12 MONTHS, HAS LACK OF TRANSPORTATION KEPT YOU FROM MEDICAL APPOINTMENTS OR FROM GETTING MEDICATIONS?: NO

## 2024-09-26 SDOH — ECONOMIC STABILITY: INCOME INSECURITY: IN THE LAST 12 MONTHS, WAS THERE A TIME WHEN YOU WERE NOT ABLE TO PAY THE MORTGAGE OR RENT ON TIME?: NO

## 2024-09-26 SDOH — SOCIAL STABILITY: SOCIAL INSECURITY: DO YOU FEEL ANYONE HAS EXPLOITED OR TAKEN ADVANTAGE OF YOU FINANCIALLY OR OF YOUR PERSONAL PROPERTY?: UNABLE TO ASSESS

## 2024-09-26 SDOH — SOCIAL STABILITY: SOCIAL INSECURITY: ARE YOU OR HAVE YOU BEEN THREATENED OR ABUSED PHYSICALLY, EMOTIONALLY, OR SEXUALLY BY ANYONE?: UNABLE TO ASSESS

## 2024-09-26 SDOH — SOCIAL STABILITY: SOCIAL INSECURITY: ARE THERE ANY APPARENT SIGNS OF INJURIES/BEHAVIORS THAT COULD BE RELATED TO ABUSE/NEGLECT?: UNABLE TO ASSESS

## 2024-09-26 SDOH — SOCIAL STABILITY: SOCIAL INSECURITY: HAVE YOU HAD THOUGHTS OF HARMING ANYONE ELSE?: UNABLE TO ASSESS

## 2024-09-26 SDOH — ECONOMIC STABILITY: INCOME INSECURITY: IN THE PAST 12 MONTHS, HAS THE ELECTRIC, GAS, OIL, OR WATER COMPANY THREATENED TO SHUT OFF SERVICE IN YOUR HOME?: NO

## 2024-09-26 SDOH — ECONOMIC STABILITY: TRANSPORTATION INSECURITY
IN THE PAST 12 MONTHS, HAS LACK OF TRANSPORTATION KEPT YOU FROM MEETINGS, WORK, OR FROM GETTING THINGS NEEDED FOR DAILY LIVING?: NO

## 2024-09-26 SDOH — ECONOMIC STABILITY: TRANSPORTATION INSECURITY
IN THE PAST 12 MONTHS, HAS THE LACK OF TRANSPORTATION KEPT YOU FROM MEDICAL APPOINTMENTS OR FROM GETTING MEDICATIONS?: NO

## 2024-09-26 SDOH — SOCIAL STABILITY: SOCIAL INSECURITY: WERE YOU ABLE TO COMPLETE ALL THE BEHAVIORAL HEALTH SCREENINGS?: YES

## 2024-09-26 SDOH — SOCIAL STABILITY: SOCIAL INSECURITY
WITHIN THE LAST YEAR, HAVE YOU BEEN RAPED OR FORCED TO HAVE ANY KIND OF SEXUAL ACTIVITY BY YOUR PARTNER OR EX-PARTNER?: PATIENT UNABLE TO ANSWER

## 2024-09-26 SDOH — ECONOMIC STABILITY: FOOD INSECURITY: HOW HARD IS IT FOR YOU TO PAY FOR THE VERY BASICS LIKE FOOD, HOUSING, MEDICAL CARE, AND HEATING?: NOT HARD AT ALL

## 2024-09-26 SDOH — ECONOMIC STABILITY: FOOD INSECURITY
WITHIN THE PAST 12 MONTHS, YOU WORRIED THAT YOUR FOOD WOULD RUN OUT BEFORE YOU GOT MONEY TO BUY MORE.: PATIENT UNABLE TO ANSWER

## 2024-09-26 ASSESSMENT — LIFESTYLE VARIABLES
HOW OFTEN DO YOU HAVE 6 OR MORE DRINKS ON ONE OCCASION: PATIENT UNABLE TO ANSWER
AUDIT-C TOTAL SCORE: -1
AUDIT-C TOTAL SCORE: -1
SKIP TO QUESTIONS 9-10: 0
HOW OFTEN DO YOU HAVE A DRINK CONTAINING ALCOHOL: PATIENT UNABLE TO ANSWER
HOW OFTEN DO YOU HAVE 6 OR MORE DRINKS ON ONE OCCASION: PATIENT UNABLE TO ANSWER
SKIP TO QUESTIONS 9-10: 0
AUDIT-C TOTAL SCORE: -1
HOW OFTEN DO YOU HAVE A DRINK CONTAINING ALCOHOL: PATIENT UNABLE TO ANSWER
HOW MANY STANDARD DRINKS CONTAINING ALCOHOL DO YOU HAVE ON A TYPICAL DAY: PATIENT UNABLE TO ANSWER
AUDIT-C TOTAL SCORE: -1
HOW MANY STANDARD DRINKS CONTAINING ALCOHOL DO YOU HAVE ON A TYPICAL DAY: PATIENT UNABLE TO ANSWER

## 2024-09-26 ASSESSMENT — PAIN - FUNCTIONAL ASSESSMENT
PAIN_FUNCTIONAL_ASSESSMENT: FLACC (FACE, LEGS, ACTIVITY, CRY, CONSOLABILITY)
PAIN_FUNCTIONAL_ASSESSMENT: UNABLE TO SELF-REPORT
PAIN_FUNCTIONAL_ASSESSMENT: 0-10
PAIN_FUNCTIONAL_ASSESSMENT: FLACC (FACE, LEGS, ACTIVITY, CRY, CONSOLABILITY)
PAIN_FUNCTIONAL_ASSESSMENT: 0-10
PAIN_FUNCTIONAL_ASSESSMENT: FLACC (FACE, LEGS, ACTIVITY, CRY, CONSOLABILITY)

## 2024-09-26 ASSESSMENT — ACTIVITIES OF DAILY LIVING (ADL)
HEARING - RIGHT EAR: HEARING AID
HEARING - RIGHT EAR: HEARING AID
LACK_OF_TRANSPORTATION: NO
PATIENT'S MEMORY ADEQUATE TO SAFELY COMPLETE DAILY ACTIVITIES?: NO
BATHING: DEPENDENT
ADEQUATE_TO_COMPLETE_ADL: NO
TOILETING: DEPENDENT
DRESSING YOURSELF: NEEDS ASSISTANCE
PATIENT'S MEMORY ADEQUATE TO SAFELY COMPLETE DAILY ACTIVITIES?: NO
FEEDING YOURSELF: NEEDS ASSISTANCE
TOILETING: DEPENDENT
GROOMING: DEPENDENT
ADEQUATE_TO_COMPLETE_ADL: NO
HEARING - LEFT EAR: HEARING AID
HEARING - LEFT EAR: HEARING AID
GROOMING: DEPENDENT
JUDGMENT_ADEQUATE_SAFELY_COMPLETE_DAILY_ACTIVITIES: NO
WALKS IN HOME: NEEDS ASSISTANCE
LACK_OF_TRANSPORTATION: NO
FEEDING YOURSELF: NEEDS ASSISTANCE
DRESSING YOURSELF: NEEDS ASSISTANCE
BATHING: DEPENDENT
WALKS IN HOME: NEEDS ASSISTANCE
JUDGMENT_ADEQUATE_SAFELY_COMPLETE_DAILY_ACTIVITIES: NO
ASSISTIVE_DEVICE: HEARING AID - LEFT;HEARING AID - RIGHT
ASSISTIVE_DEVICE: HEARING AID - LEFT;HEARING AID - RIGHT

## 2024-09-26 ASSESSMENT — COGNITIVE AND FUNCTIONAL STATUS - GENERAL
TOILETING: A LOT
DRESSING REGULAR LOWER BODY CLOTHING: A LITTLE
MOVING FROM LYING ON BACK TO SITTING ON SIDE OF FLAT BED WITH BEDRAILS: A LITTLE
HELP NEEDED FOR BATHING: A LITTLE
TURNING FROM BACK TO SIDE WHILE IN FLAT BAD: A LITTLE
STANDING UP FROM CHAIR USING ARMS: A LITTLE
DAILY ACTIVITIY SCORE: 17
PATIENT BASELINE BEDBOUND: NO
DRESSING REGULAR UPPER BODY CLOTHING: A LITTLE
PERSONAL GROOMING: A LITTLE
EATING MEALS: A LITTLE
MOVING TO AND FROM BED TO CHAIR: A LITTLE
WALKING IN HOSPITAL ROOM: A LITTLE
MOBILITY SCORE: 18
CLIMB 3 TO 5 STEPS WITH RAILING: A LITTLE

## 2024-09-26 ASSESSMENT — COLUMBIA-SUICIDE SEVERITY RATING SCALE - C-SSRS
2. HAVE YOU ACTUALLY HAD ANY THOUGHTS OF KILLING YOURSELF?: NO
6. HAVE YOU EVER DONE ANYTHING, STARTED TO DO ANYTHING, OR PREPARED TO DO ANYTHING TO END YOUR LIFE?: NO
1. IN THE PAST MONTH, HAVE YOU WISHED YOU WERE DEAD OR WISHED YOU COULD GO TO SLEEP AND NOT WAKE UP?: NO

## 2024-09-26 ASSESSMENT — PAIN SCALES - GENERAL
PAINLEVEL_OUTOF10: 0 - NO PAIN
PAINLEVEL_OUTOF10: 10 - WORST POSSIBLE PAIN

## 2024-09-26 ASSESSMENT — PAIN DESCRIPTION - LOCATION: LOCATION: INCISION

## 2024-09-26 ASSESSMENT — PAIN DESCRIPTION - ORIENTATION: ORIENTATION: RIGHT

## 2024-09-26 NOTE — INTERVAL H&P NOTE
H&P reviewed. The patient was examined and there are no changes to the H&P.  Patient did not take any of his home medications this AM per sister (POA)

## 2024-09-26 NOTE — BRIEF OP NOTE
Date: 2024  OR Location: [facility] OR    Name: Benjie Waters, : 1955, Age: 68 y.o., MRN: [id number], Sex: male    Diagnosis  Pre-op Diagnosis      * Lung nodule [R91.1] Post-op Diagnosis     * Lung nodule [R91.1]     Procedures  Resection Robot-Assisted Lung Wedge; Resection Robot-ssisted Lung Wedge [id number], Excision Tissue Robot-Assisted Mediastinum [id number]  [id number] - WA THORACOSCOPY W/THERA WEDGE RESEXN INITIAL UNILAT      Surgeons      * Cyrus Johnson - Primary    Resident/Fellow/Other Assistant:  Surgeons and Role:     * Leidy Fisher PA-C - BRITTA First Assist  Dawson Gill MD, Fellow  Procedure Summary  Anesthesia: Anesthesia type not filed in the log.  ASA: III  Anesthesia Staff: Anesthesiologist: Naren Dumont MD  CRNA: SADIA Zarco-CRNA  SRNA: Leonardo Vicente  Estimated Blood Loss: 25mL  Intra-op Medications:   Administrations occurring from 0645 to 0940 on 24:   Medication Name Total Dose   sodium chloride 0.9 % irrigation solution 1,000 mL   BUPivacaine-EPINEPHrine (Marcaine w/EPI) 0.25 %-1:200,000 injection 30 mL   ceFAZolin (Ancef) injection  - Omnicell Override Pull Cannot be calculated   fentaNYL PF (Sublimaze) injection  - Omnicell Override Pull Cannot be calculated   lactated Ringer's infusion  - Omnicell Override Pull Cannot be calculated   lidocaine (cardiac) (Xylocaine) injection  - Omnicell Override Pull Cannot be calculated   phenylephrine HCl in 0.9% NaCl syringe  - Omnicell Override Pull Cannot be calculated   propofol (Diprivan) injection  - Omnicell Override Pull Cannot be calculated   rocuronium (ZeMuron) injection  - Omnicell Override Pull Cannot be calculated   rocuronium (ZeMuron) injection  - Omnicell Override Pull Cannot be calculated              Anesthesia Record               Intraprocedure I/O Totals          Intake    Dexmedetomidine 0.00 mL    The total shown is the total volume documented since Anesthesia Start was filed.     Phenylephrine Drip 0.00 mL    The total shown is the total volume documented since Anesthesia Start was filed.    Total Intake 0 mL          Specimen:   ID Type Source Tests Collected by Time   1 : right upper lobe wedge Tissue LUNG WEDGE RESECTION RIGHT (SPECIFY SITE) SURGICAL PATHOLOGY EXAM Cyrus Johnson MD 9/26/2024 0801   2 : level 8 Tissue LYMPH NODE PULMONARY (SPECIFY SITE) SURGICAL PATHOLOGY EXAM Cyrus Johnson MD 9/26/2024 0809   3 : level 9 Tissue LYMPH NODE PULMONARY (SPECIFY SITE) SURGICAL PATHOLOGY EXAM Cyrus Johnson MD 9/26/2024 0810   4 : level 10 Tissue LYMPH NODE PULMONARY (SPECIFY SITE) SURGICAL PATHOLOGY EXAM Cyrus Johnson MD 9/26/2024 0817   5 : level 7 Tissue LYMPH NODE PULMONARY (SPECIFY SITE) SURGICAL PATHOLOGY EXAM Cyrus Johnson MD 9/26/2024 0826   6 : level 4R Tissue LYMPH NODE PULMONARY (SPECIFY SITE) SURGICAL PATHOLOGY EXAM Cyrus Johnson MD 9/26/2024 0834   7 : right upper lobe wedge #2 Tissue LYMPH NODE PULMONARY (SPECIFY SITE) SURGICAL PATHOLOGY EXAM Cyrus Johnson MD 9/26/2024 0848        Staff:   Circulator: Steve  Scrub Person: Jeramy  Circulator: Clair  Scrub Person: Radha          Findings: Successful RUL wedge, additional margin taken based on frozen pathology. Mediastinal LN dissection. Lung reinflated under vision.     Complications:  None; patient tolerated the procedure well.     Disposition: PACU - hemodynamically stable.  Condition: stable  Specimens Collected:   ID Type Source Tests Collected by Time   1 : right upper lobe wedge Tissue LUNG WEDGE RESECTION RIGHT (SPECIFY SITE) SURGICAL PATHOLOGY EXAM Cyrus Johnson MD 9/26/2024 0801   2 : level 8 Tissue LYMPH NODE PULMONARY (SPECIFY SITE) SURGICAL PATHOLOGY EXAM Cyrus Johnson MD 9/26/2024 0809   3 : level 9 Tissue LYMPH NODE PULMONARY (SPECIFY SITE) SURGICAL PATHOLOGY EXAM Cyrus Johnson MD 9/26/2024 0810   4 : level 10 Tissue  LYMPH NODE PULMONARY (SPECIFY SITE) SURGICAL PATHOLOGY EXAM Cyrus Johnson MD 9/26/2024 0817   5 : level 7 Tissue LYMPH NODE PULMONARY (SPECIFY SITE) SURGICAL PATHOLOGY EXAM Cyrus Johnson MD 9/26/2024 0826   6 : level 4R Tissue LYMPH NODE PULMONARY (SPECIFY SITE) SURGICAL PATHOLOGY EXAM Cyrus Johnson MD 9/26/2024 0834   7 : right upper lobe wedge #2 Tissue LYMPH NODE PULMONARY (SPECIFY SITE) SURGICAL PATHOLOGY EXAM Cyrus Johnson MD 9/26/2024 0848     Attending Attestation:     Cyrus Johnson  Phone Number: [phone]

## 2024-09-26 NOTE — ANESTHESIA PREPROCEDURE EVALUATION
Patient: Benjie Waters    Procedure Information       Date/Time: 09/26/24 0645    Procedure: Resection Robot-Assisted Lung Wedge; Resection Robot-ssisted Lung Wedge [id number], Excision Tissue Robot-Assisted Mediastinum [id number] (Right: Chest) - Robotic assisted right upper lobe wedge resection with mediastinal lymphadenectomy; ROBOT approved by Daksha Jaimes.    Location: [facility] OR 14 / Virtual [facility] OR    Surgeons: Cyrus Johnson MD            Relevant Problems   Anesthesia (within normal limits)      Cardiac   (+) Benign essential HTN   (+) High cholesterol   (+) Hypertension   (+) Mixed hyperlipidemia      Pulmonary   (+) Obstructive sleep apnea syndrome   (+) Pulmonary emphysema (Multi)   (+) Pulmonary nodules      GI   (+) Gastroesophageal reflux disease      Endocrine   (+) Diabetes mellitus type 2 without retinopathy (Multi)   (+) Type 2 diabetes mellitus      Hematology   (+) Anemia of chronic disease      HEENT   (+) Glaucoma   (+) Glaucoma of both eyes   (+) Mixed conductive and sensorineural hearing loss of both ears   (+) Primary open angle glaucoma      ID   (+) Onychomycosis of toenail       Clinical information reviewed:   Tobacco  Allergies  Meds   Med Hx  Surg Hx   Fam Hx  Soc Hx        NPO Detail:  NPO/Void Status  Carbohydrate Drink Given Prior to Surgery? : N  Date of Last Liquid: 09/25/24  Time of Last Liquid: 2000  Date of Last Solid: 09/25/24  Time of Last Solid: 2000  Last Intake Type: Food  Time of Last Void: 0500         Physical Exam    Airway  Mallampati: III  TM distance: >3 FB     Cardiovascular - normal exam     Dental    Pulmonary - normal exam     Abdominal            Anesthesia Plan    History of general anesthesia?: yes  History of complications of general anesthesia?: no    ASA 3     general     Anesthetic plan and risks discussed with patient.  Use of blood products discussed with patient who.    Plan discussed with CRNA.

## 2024-09-26 NOTE — ADDENDUM NOTE
Addendum  created 09/26/24 0959 by Leonardo Vicente    Intraprocedure Meds edited, Orders acknowledged in Narrator

## 2024-09-26 NOTE — ANESTHESIA POSTPROCEDURE EVALUATION
Patient: Benjie Waters    Procedure Summary       Date: 09/26/24 Room / Location: [facility] OR 14 / [facility] OR    Anesthesia Start: 0719 Anesthesia Stop: 0916    Procedure: Resection Robot-Assisted Lung Wedge; Resection Robot-ssisted Lung Wedge [id number], Excision Tissue Robot-Assisted Mediastinum [id number] (Right: Chest) Diagnosis:       Lung nodule      (Lung nodule [R91.1])    Surgeons: Cyrus Johnson MD Responsible Provider: Naren Dumont MD    Anesthesia Type: general ASA Status: 3            Anesthesia Type: general    Vitals Value Taken Time   /58 09/26/24 0915   Temp 36 09/26/24 0926   Pulse 81 09/26/24 0922   Resp 12 09/26/24 0922   SpO2 95 % 09/26/24 0922   Vitals shown include unfiled device data.    Anesthesia Post Evaluation    Patient location during evaluation: PACU  Patient participation: complete - patient participated  Level of consciousness: awake  Pain management: adequate  Airway patency: patent  Cardiovascular status: acceptable  Respiratory status: acceptable  Hydration status: acceptable  Postoperative Nausea and Vomiting: none        There were no known notable events for this encounter.

## 2024-09-26 NOTE — PROGRESS NOTES
09/26/24 1459   Discharge Planning   Living Arrangements Family members   Support Systems Family members   Type of Residence Private residence   Number of Stairs to Enter Residence 1   Number of Stairs Within Residence 3   Do you have animals or pets at home? No   Who is requesting discharge planning? Provider   Home or Post Acute Services None   Expected Discharge Disposition Home   Does the patient need discharge transport arranged? No     Met with patient/sister to introduce myself,role and discuss discharge planning.  Patient lives with his sister.  Patient per sister is independent in all adl's.  Requires no assist devices for mobility.  Patient denies active home care or home care needs.  No recent falls.  Patient expressed no issues with making follow up appointments or getting his medications.  Patient expressed no questions and no social work concerns.   PCP:  Meseret Terrell  Pharmacy:  [facility]  Home Care:  N/A  DME:  N/A

## 2024-09-27 ENCOUNTER — APPOINTMENT (OUTPATIENT)
Dept: RADIOLOGY | Facility: HOSPITAL | Age: 69
End: 2024-09-27
Payer: MEDICARE

## 2024-09-27 VITALS
BODY MASS INDEX: 24.79 KG/M2 | RESPIRATION RATE: 18 BRPM | TEMPERATURE: 98.4 F | DIASTOLIC BLOOD PRESSURE: 62 MMHG | OXYGEN SATURATION: 93 % | HEART RATE: 94 BPM | SYSTOLIC BLOOD PRESSURE: 98 MMHG | WEIGHT: 153.56 LBS

## 2024-09-27 LAB
GLUCOSE BLD MANUAL STRIP-MCNC: 160 MG/DL (ref 74–99)
GLUCOSE BLD MANUAL STRIP-MCNC: 161 MG/DL (ref 74–99)

## 2024-09-27 PROCEDURE — 2500000004 HC RX 250 GENERAL PHARMACY W/ HCPCS (ALT 636 FOR OP/ED)

## 2024-09-27 PROCEDURE — 2500000004 HC RX 250 GENERAL PHARMACY W/ HCPCS (ALT 636 FOR OP/ED): Performed by: NURSE PRACTITIONER

## 2024-09-27 PROCEDURE — 94640 AIRWAY INHALATION TREATMENT: CPT

## 2024-09-27 PROCEDURE — 2500000002 HC RX 250 W HCPCS SELF ADMINISTERED DRUGS (ALT 637 FOR MEDICARE OP, ALT 636 FOR OP/ED): Performed by: STUDENT IN AN ORGANIZED HEALTH CARE EDUCATION/TRAINING PROGRAM

## 2024-09-27 PROCEDURE — 71045 X-RAY EXAM CHEST 1 VIEW: CPT | Performed by: RADIOLOGY

## 2024-09-27 PROCEDURE — 71045 X-RAY EXAM CHEST 1 VIEW: CPT

## 2024-09-27 PROCEDURE — 2500000001 HC RX 250 WO HCPCS SELF ADMINISTERED DRUGS (ALT 637 FOR MEDICARE OP): Performed by: STUDENT IN AN ORGANIZED HEALTH CARE EDUCATION/TRAINING PROGRAM

## 2024-09-27 PROCEDURE — 2500000005 HC RX 250 GENERAL PHARMACY W/O HCPCS: Performed by: PHYSICIAN ASSISTANT

## 2024-09-27 PROCEDURE — 2500000004 HC RX 250 GENERAL PHARMACY W/ HCPCS (ALT 636 FOR OP/ED): Mod: JZ | Performed by: STUDENT IN AN ORGANIZED HEALTH CARE EDUCATION/TRAINING PROGRAM

## 2024-09-27 PROCEDURE — 82947 ASSAY GLUCOSE BLOOD QUANT: CPT

## 2024-09-27 PROCEDURE — 97161 PT EVAL LOW COMPLEX 20 MIN: CPT | Mod: GP

## 2024-09-27 PROCEDURE — 99239 HOSP IP/OBS DSCHRG MGMT >30: CPT | Performed by: NURSE PRACTITIONER

## 2024-09-27 RX ORDER — ACETAMINOPHEN 325 MG/1
975 TABLET ORAL EVERY 8 HOURS
Start: 2024-09-27

## 2024-09-27 RX ORDER — OXYCODONE HYDROCHLORIDE 5 MG/1
5 TABLET ORAL EVERY 6 HOURS PRN
Qty: 20 TABLET | Refills: 0 | Status: SHIPPED | OUTPATIENT
Start: 2024-09-27

## 2024-09-27 ASSESSMENT — PAIN - FUNCTIONAL ASSESSMENT
PAIN_FUNCTIONAL_ASSESSMENT: 0-10
PAIN_FUNCTIONAL_ASSESSMENT: 0-10

## 2024-09-27 ASSESSMENT — PAIN SCALES - GENERAL
PAINLEVEL_OUTOF10: 2
PAINLEVEL_OUTOF10: 2
PAINLEVEL_OUTOF10: 5 - MODERATE PAIN
PAINLEVEL_OUTOF10: 0 - NO PAIN

## 2024-09-27 ASSESSMENT — PAIN DESCRIPTION - LOCATION
LOCATION: INCISION
LOCATION: INCISION

## 2024-09-27 ASSESSMENT — COGNITIVE AND FUNCTIONAL STATUS - GENERAL
WALKING IN HOSPITAL ROOM: A LITTLE
MOVING FROM LYING ON BACK TO SITTING ON SIDE OF FLAT BED WITH BEDRAILS: A LITTLE
TURNING FROM BACK TO SIDE WHILE IN FLAT BAD: A LITTLE
STANDING UP FROM CHAIR USING ARMS: A LITTLE
MOVING TO AND FROM BED TO CHAIR: A LITTLE
MOBILITY SCORE: 17
CLIMB 3 TO 5 STEPS WITH RAILING: A LOT

## 2024-09-27 ASSESSMENT — ACTIVITIES OF DAILY LIVING (ADL): ADL_ASSISTANCE: INDEPENDENT

## 2024-09-27 NOTE — DISCHARGE SUMMARY
Discharge Diagnosis  Lung nodule    Issues Requiring Follow-Up  Final pathology    Test Results Pending At Discharge  Pending Labs       Order Current Status    Surgical Pathology Exam In process            Hospital Course   Mr. Benjie Waters is a 68 year old with developmental delay and cerebral palsy who is a smoker. He was found to have a suspicious right upper lobe lung nodule was been increasing in size. This is solid nodule that measures about 11 mm in size. He has been seen by pulmonology. He is now status post Resection Robot-Assisted Lung Wedge; Resection Robot-ssisted Lung Wedge on 09/26/2024. Satisfactory post op course.    The patient has had an uncomplicated surgical course. Post operatively he required a total of 750cc in supplemental IVF in response to inadequate PO intake and hypotension of SBP in 80s.  He maintained good UOP.  His chest tubes were removed on post operative day 1 (after a brief clamp trial) and post pull imaging was without evidence of clinically significant  pneumothorax.     At the time of discharge, his pain was controlled on an oral pain regimen, He was breathing comfortably on room air.  Hewas ambulating independently.  He was tolerating a regular diet without nausea. He was voiding regularly and quantity sufficient.      The patient was educated on post operative activity restrictions, dietary guidelines, and pain management. He will follow up with Dr. Han in the outpatient setting in two weeks with a CXR just prior to this visit. The patient has been instructed to add an OTC stool softeners or laxative while taking oral analgesia.  Deep breathing, coughing, and walking at home encouraged. All questions have been answered and concerns addressed until there were none.       Pertinent Physical Exam At Time of Discharge  Seen sitting in the chair x 2  [place], impaired speech 2/2 DD  .Visit Vitals  BP 96/61 (BP Location: Right arm, Patient Position: Sitting)   Pulse 85   Temp  36.9 °C (98.4 °F) (Temporal)   Resp 16   Wt 69.7 kg (153 lb 9 oz)   SpO2 92%   BMI 24.79 kg/m²   Smoking Status Every Day   BSA 1.8 m²     Breathing comfortably on RA  HRR. NSR per tele  Abdomen soft, NT  Voiding  No LE edema  Stable gait    Home Medications     Medication List      START taking these medications     acetaminophen 325 mg tablet; Commonly known as: Tylenol; Take 3 tablets   (975 mg) by mouth every 8 hours.   oxyCODONE 5 mg immediate release tablet; Commonly known as: Roxicodone;   Take 1 tablet (5 mg) by mouth every 6 hours if needed (pain).     CONTINUE taking these medications     Allegra Allergy 180 mg tablet; Generic drug: fexofenadine   artificial tears (dextran-hypomel-glycerin) 0.1-0.3-0.2 % ophthalmic   solution   ascorbic acid 1,000 mg tablet; Commonly known as: Vitamin C   aspirin 81 mg EC tablet   atorvastatin 40 mg tablet; Commonly known as: Lipitor; Take 1 tablet (40   mg) by mouth once daily.   brimonidine 0.2 % ophthalmic solution; Commonly known as: AlphaGAN;   Administer 1 drop into both eyes 2 times a day.   empagliflozin 10 mg; Commonly known as: Jardiance; Take 1 tablet (10 mg)   by mouth once daily.   flaxseed oiL 1,000 mg capsule   FreeStyle Lite Strips strip; Generic drug: blood sugar diagnostic; Use   once daily and as needed   losartan 25 mg tablet; Commonly known as: Cozaar; TAKE 1 TABLET BY MOUTH   EVERY DAY   metFORMIN  mg 24 hr tablet; Commonly known as: Glucophage-XR; TAKE   2 TABLETS (1000 MG) BY MOUTH TWICE A DAY   montelukast 10 mg tablet; Commonly known as: Singulair; TAKE 1 TABLET BY   MOUTH EVERY DAY   pantoprazole 40 mg EC tablet; Commonly known as: ProtoNix; Take 1 tablet   (40 mg) by mouth once daily.     STOP taking these medications     chlorhexidine 0.12 % solution; Commonly known as: Peridex   chlorhexidine 4 % external liquid; Commonly known as: Hibiclens       Outpatient Follow-Up  Future Appointments   Date Time Provider Department Center    10/15/2024  9:00 AM Lupillo Weinstein MD [id number] [place]   10/15/2024  9:30 AM Theodore Perez [id number] [place]   10/21/2024 10:45 AM Cyrus Johnson MD [id number] [facility]   12/10/2024  9:00 AM Panfilo Ingram MD [id number] [facility]   12/11/2024  2:30 PM Man Sandoval MD [id number] [facility]   12/17/2024  9:30 AM Meseret Hope PA-C [id number] [facility]     Time spent with discharge was >30 minutes and included explanation of discharge instructions, arranging homegoing prescriptions, supplies and follow-up, and creating the discharge summary of the patient's hospialization.      Rhiannon Genao, APRN-CNP

## 2024-09-27 NOTE — PROGRESS NOTES
09/27/24 1516   Discharge Planning   Living Arrangements Family members   Support Systems Family members   Assistance Needed Wheeled Walker   Does the patient need discharge transport arranged? No     Patient is medically ready for discharge.  Wheeled walker provide through [employer].

## 2024-09-27 NOTE — OP NOTE
Resection Robot-Assisted Lung Wedge; Resection Robot-ssisted Lung Wedge [id number], Excision Tissue Robot-Assisted Mediastinum [id number] (R) Operative Note     Date: 2024  OR Location: [facility] OR    Name: Benjie Waters, : 1955, Age: 68 y.o., MRN: [id number], Sex: male    Diagnosis  Pre-op Diagnosis      * Lung nodule [R91.1] Post-op Diagnosis     * Lung nodule [R91.1]     Procedures  Resection Robot-Assisted Lung Wedge; Resection Robot-ssisted Lung Wedge [id number], Excision Tissue Robot-Assisted Mediastinum [id number]  [id number] - [place] THORACOSCOPY W/THERA WEDGE RESEXN INITIAL UNILAT      Surgeons      * Cyrus Johnson - Primary    Resident/Fellow/Other Assistant:  Surgeons and Role:     * Leidy Fisher, PA-C - [facility] Assist     * Corin Mendoza, PA-C - [facility] Assist    Procedure Summary  Anesthesia: General  ASA: III  Anesthesia Staff: Anesthesiologist: Naren Dumont MD  CRNA: SADIA Zarco-CRNA  SRNA: Leonardo Vicente  Estimated Blood Loss: 25 mL  Intra-op Medications:   Administrations occurring from 0645 to 0940 on 24:   Medication Name Total Dose   sodium chloride 0.9 % irrigation solution 1,000 mL   BUPivacaine-EPINEPHrine (Marcaine w/EPI) 0.25 %-1:200,000 injection 30 mL   ceFAZolin (Ancef) injection  - Omnicell Override Pull Cannot be calculated   fentaNYL PF (Sublimaze) injection  - Omnicell Override Pull Cannot be calculated   lactated Ringer's infusion  - Omnicell Override Pull Cannot be calculated   lactated Ringer's infusion  - Omnicell Override Pull Cannot be calculated   lidocaine (cardiac) (Xylocaine) injection  - Omnicell Override Pull Cannot be calculated   ondansetron (Zofran) injection  - Omnicell Override Pull Cannot be calculated   phenylephrine HCl in 0.9% NaCl syringe  - Omnicell Override Pull Cannot be calculated   propofol (Diprivan) injection  - Omnicell Override Pull Cannot be calculated   rocuronium (ZeMuron) injection  - Omnicell  Override Pull Cannot be calculated   rocuronium (ZeMuron) injection  - Omnicell Override Pull Cannot be calculated   sugammadex (Bridion) injection  - Omnicell Override Pull Cannot be calculated              Anesthesia Record               Intraprocedure I/O Totals          Intake    Dexmedetomidine 0.00 mL    The total shown is the total volume documented since Anesthesia Start was filed.    Phenylephrine Drip 0.00 mL    The total shown is the total volume documented since Anesthesia Start was filed.    Total Intake 0 mL       Output    Est. Blood Loss 10 mL    Total Output 10 mL       Net    Net Volume -10 mL          Specimen:   ID Type Source Tests Collected by Time   1 : right upper lobe wedge Tissue LUNG WEDGE RESECTION RIGHT (SPECIFY SITE) SURGICAL PATHOLOGY EXAM Cyrus Johnson MD 9/26/2024 0801   2 : level 8 Tissue LYMPH NODE PULMONARY (SPECIFY SITE) SURGICAL PATHOLOGY EXAM Cyrus Johnson MD 9/26/2024 0809   3 : level 9 Tissue LYMPH NODE PULMONARY (SPECIFY SITE) SURGICAL PATHOLOGY EXAM Cyrus Johnson MD 9/26/2024 0810   4 : level 10 Tissue LYMPH NODE PULMONARY (SPECIFY SITE) SURGICAL PATHOLOGY EXAM Cyrus Johnson MD 9/26/2024 0817   5 : level 7 Tissue LYMPH NODE PULMONARY (SPECIFY SITE) SURGICAL PATHOLOGY EXAM Cyrus Johnson MD 9/26/2024 0826   6 : level 4R Tissue LYMPH NODE PULMONARY (SPECIFY SITE) SURGICAL PATHOLOGY EXAM Cyrus Johnson MD 9/26/2024 0834   7 : right upper lobe wedge #2 Tissue LUNG WEDGE RESECTION RIGHT (SPECIFY SITE) SURGICAL PATHOLOGY EXAM Cyrus Johnson MD 9/26/2024 0848        Staff:   Circulator: Steve Tapia Person: Jeramy  Circulator: Clair Tapia Person: Radha         Drains and/or Catheters:   Chest Tube 1 Right Pleural 28 Fr (Active)   Function -20 cm H2O 09/26/24 1143   Chest Tube Air Leak No 09/26/24 1015   Patency Intervention Tip/tilt 09/26/24 1143   Drainage Description Serosanguineous 09/26/24 1143   Dressing  Status Clean;Dry 09/26/24 1143   Site Assessment Clean;Dry;Intact 09/26/24 1143   Surrounding Skin Unable to view 09/26/24 1015   Output (mL) 55 mL 09/26/24 1756       Tourniquet Times:         Implants:     Findings: Right upper lobe wedge with NSCLC.  Close margin so extra wedge was taken.    Indications: Benjie Waters is an 68 y.o. male who is having surgery for Lung nodule [R91.1]. Lung nodule suspicious for malignancy here for resection.    The patient was seen in the preoperative area. The risks, benefits, complications, treatment options, non-operative alternatives, expected recovery and outcomes were discussed with the patient. The possibilities of reaction to medication, pulmonary aspiration, injury to surrounding structures, bleeding, recurrent infection, the need for additional procedures, failure to diagnose a condition, and creating a complication requiring transfusion or operation were discussed with the patient. The patient concurred with the proposed plan, giving informed consent.  The site of surgery was properly noted/marked if necessary per policy. The patient has been actively warmed in preoperative area. Preoperative antibiotics have been ordered and given within 1 hours of incision. Venous thrombosis prophylaxis have been ordered including bilateral sequential compression devices and chemical prophylaxis    Procedure Details: After identifying the patient in the preoperative area, the patient was brought to the room.  Patient placed in the supine position.  Timeout performed.  General anesthesia induced with a double-lumen tube.  Flexible bronchoscopy performed to assess position of endotracheal tube and also to assess the airway.  No endobronchial lesions were identified.  Patient placed in the left lateral decubitus with the right side up.  All bony prominences were padded.  Chest was prepped and draped in surgical fashion.  A 1 cm incision was performed in the eighth intercostal space  with midaxillary line.  After gaining access to the chest cavity, multilevel rib blocks with Marcaine were done under direct visualization.  1 to 2 cc of quarter percent Marcaine were injected into each interspace from spaces 3-8.  Additional ports were placed in the same interspace 1 anterior and 2 posterior.  An assist port was placed between arms 2 and 3.  Robot was docked. I proceeded to scrub out and moved to the console. Nodule identified in the right upper lobe.  Using a combination of black loads of the robotic stapler a wedge of the upper lobe was taken.  Specimen removed in an endocatch bag.  Frozen section revealing a NSCLC.  Margin close.  Additional wedge taken to secure a better margin.  Attention was turned to the inferior pulmonary ligament which was dissected and Station 9 and 8 lymph nodes were harvested and sent for permanent section.  The dissection was taken posteriorly to the level of the hilum and also the subcarinal space lymph nodes were harvested and sent for permanent section.  Station 4R lymph nodes harvested and sent for permanent section.   Hemostasis was achieved at all times.  28 [place] chest tube placed.  Robot undocked.  Lung insufflated under direct visualization.  All wounds were closed in layers.  Patient tolerated procedure well and transferred to the postanesthesia care unit in adequate conditions.  All counts were correct.  No qualified resident or fellow to bedside assist during this robotic case so Corin Mendoza acted as a first assistant during this robotic case.   Complications:  None; patient tolerated the procedure well.    Disposition: PACU - hemodynamically stable.  Condition: stable           Attending Attestation: I was present and scrubbed for the entire procedure.    Cyrus Johnson  Phone Number: [phone]

## 2024-09-27 NOTE — PROGRESS NOTES
Physical Therapy    Physical Therapy Evaluation    Patient Name: Benjie Waters  MRN: [id number]  Department: [place] [address]  Room: [address] [place] [address]  Today's Date: 9/27/2024   Time Calculation  Start Time: 1357  Stop Time: 1414  Time Calculation (min): 17 min    Assessment/Plan   PT Assessment  PT Assessment Results: Decreased strength, Decreased endurance, Impaired balance, Decreased mobility, Impaired vision, Pain  Rehab Prognosis: Good  Barriers to Discharge: none  Evaluation/Treatment Tolerance: Patient tolerated treatment well  Medical Staff Made Aware: Yes  Strengths: Housing layout, Support and attitude of living partners, Premorbid level of function  End of Session Communication: Bedside nurse  Assessment Comment: Pt. is a 68 yom that presents with impairments including visual deficits (baseline), increased back pain, decreased functional strength, decreased activity tolerance/endurance, impaired balance, and increased difficulty with functional mobility compared to baseline level of function. Pt. will benefit from skilled PT intervention while inpatient to address the above deficits and maximize return to PLOF. Anticipate pt is near baseline level of function and will benefit from LOW intensity PT with 24/7 assist from sister. Educated family on use of FWW to improve stability and safety throughout functional mobility.  End of Session Patient Position: Up in chair, Alarm off, not on at start of session (Sister at bedside)    IP OR SWING BED PT PLAN  Inpatient or Swing Bed: Inpatient  PT Plan  Treatment/Interventions: Bed mobility, Transfer training, Gait training, Stair training, Balance training, Strengthening, Endurance training, Range of motion, Therapeutic exercise, Therapeutic activity, Home exercise program  PT Plan: Ongoing PT  PT Frequency: 3 times per week  PT Discharge Recommendations: Low intensity level of continued care  Equipment Recommended upon Discharge: Wheeled walker  PT Recommended Transfer  Status: Assistive device, Contact guard  PT - OK to Discharge: Yes      Subjective   General Visit Information:  General  Reason for Referral: S/p robotic assisted lung wedge resection on 9/26  Past Medical History Relevant to Rehab: developmental delay and cerebral palsy  Family/Caregiver Present: Yes  Caregiver Feedback: sister present, assisting with PLOF and home set up, supportive  Prior to Session Communication: Bedside nurse  Patient Position Received: Up in chair, Alarm off, not on at start of session  Preferred Learning Style: auditory, verbal  General Comment: Pt received seated in bedside chair, pleasant and agreeable to participate in session    Home Living:  Home Living  Type of Home: House  Lives With: Siblings (Sister)  Home Adaptive Equipment: None  Home Layout: One level  Home Access: Other (Comment) (1 DENIS with no rails + additional 3 DENIS with unilateral rail)  Bathroom Shower/Tub: Tub/shower unit  Bathroom Equipment: None  Home Living Comments: Sister present 24/7    Prior Level of Function:  Prior Function Per Pt/Caregiver Report  Level of Snow: Independent with ADLs and functional transfers, Needs assistance with homemaking  Receives Help From: Family  ADL Assistance: Independent (Per sister, pt IND with bathing, needs assist to set up clothing 2/2 visual limitations though pt able to dress self)  Homemaking Assistance: Needs assistance  Ambulatory Assistance: Independent (Per sister, pt IND within the home, furniture walks 2/2 vision deficitis. 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; jemma hearing aids  Medical Precautions: Fall precautions    Vital Signs (Past 2hrs)        Date/Time Vitals Session Patient Position Pulse Resp SpO2 BP MAP  (mmHg)    09/27/24 1357 Pre PT  --  92  --  --  --  --                   Vital Signs Comment: Max  with ambulation     Objective   Pain:  Pain Assessment  Pain Assessment: 0-10  0-10 (Numeric) Pain Score: 5 - Moderate pain  Pain Type: Acute pain  Pain Location: Back    Cognition:  Cognition  Overall Cognitive Status: Impaired at baseline  Orientation Level:  (Oriented to self)    General Assessments:  Activity Tolerance  Endurance: Tolerates 10 - 20 min exercise with multiple rests  Early Mobility/Exercise Safety Screen: Proceed with mobilization - No exclusion criteria met    Sensation  Light Touch: No apparent deficits  Sensation Comment: Pt denied N/T    Postural Control  Postural Control: Within Functional Limits    Static Sitting Balance  Static Sitting-Level of Assistance: Close supervision  Dynamic Sitting Balance  Dynamic Sitting-Level of Assistance: Close supervision    Static Standing Balance  Static Standing-Level of Assistance: Contact guard  Dynamic Standing Balance  Dynamic Standing-Level of Assistance: Contact guard    Functional Assessments:  Bed Mobility  Bed Mobility: No (Pt in chair pre/post session)    Transfers  Transfer: Yes  Transfer 1  Transfer From 1: Sit to, Stand to  Transfer to 1: Stand, Sit  Technique 1: Sit to stand, Stand to sit  Transfer Device 1:  (none)  Transfer Level of Assistance 1: Contact guard  Trials/Comments 1: Pt karen COLLIER, immediately following ascent pt reaching for bed to steady self  Transfers 2  Transfer From 2: Chair with arms to  Transfer to 2: Stand  Technique 2: Sit to stand  Transfer Device 2: Walker  Transfer Level of Assistance 2: Contact guard, Minimal verbal cues  Trials/Comments 2: Cues for proper UE placement and technique  Transfers 3  Transfer From 3: Stand to  Transfer to 3: Chair with arms  Technique 3: Stand to sit  Transfer Device 3:  (none)  Transfer Level of Assistance 3: Contact guard  Trials/Comments 3: Pt required verbal and tactile  cues for proper set up prior to transferring to chair to maximize safety. Pt attempting to sit prematurely.    Ambulation/Gait Training  Ambulation/Gait Training Performed: Yes  Ambulation/Gait Training 1  Surface 1: Level tile  Device 1: No device (Arm in arm assist on R)  Assistance 1: Contact guard  Quality of Gait 1: Decreased step length, Narrow base of support, Shuffling gait (slow gurpreet, demos minor instability and reaching out to feel for objects 2/2 visual deficits)  Comments/Distance (ft) 1: 8 ft, seated rest break 2/2 pain  Ambulation/Gait Training 2  Surface 2: Level tile  Device 2: Rolling walker  Assistance 2: Contact guard, Minimal verbal cues  Quality of Gait 2: Decreased step length (slow gurpreet, demos improved stability compared to no AD)  Comments/Distance (ft) 2: 30 ft, cues for AD management and sequencing  Ambulation/Gait Training 3  Surface 3: Level tile  Device 3: No device (arm in arm assist on L)  Assistance 3: Contact guard, Minimal verbal cues  Quality of Gait 3: Decreased step length, Shuffling gait, Narrow base of support (slow gurpreet, increased lateral instability though no acute LOB)  Comments/Distance (ft) 3: 30 ft    Stairs  Stairs: No (Pt declined to attempt this date)    Extremity/Trunk Assessments:  RLE   RLE : Within Functional Limits  LLE   LLE : Exceptions to WFL  AROM LLE (degrees)  LLE AROM Comment: appears WFL  Strength LLE  LLE Overall Strength: Greater than or equal to 3/5 as evidenced by functional mobility  L Ankle Dorsiflexion: 4-/5    Outcome Measures:  [facility] Basic Mobility  Turning from your back to your side while in a flat bed without using bedrails: A little  Moving from lying on your back to sitting on the side of a flat bed without using bedrails: A little  Moving to and from bed to chair (including a wheelchair): A little  Standing up from a chair using your arms (e.g. wheelchair or bedside chair): A little  To walk in hospital room: A little  Climbing 3-5  steps with railing: A lot  Basic Mobility - Total Score: 17    Encounter Problems       Encounter Problems (Active)       Balance       Patient to demo static standing with unilateral UE support, performing single UE task with SBA, no sway or LOB x 2 mins for functional carryover        Start:  09/27/24    Expected End:  10/11/24               Mobility       STG - Patient will ambulate >/= 100 ft Caryn with LRAD and no acute LOB, only verbal cues 2/2 visual deficits       Start:  09/27/24    Expected End:  10/11/24            Patient to ascend/descend >/= 4 stairs with HR and CGAx1 to demo ability to safely traverse DENIS        Start:  09/27/24    Expected End:  10/11/24               PT Transfers       STG - Patient will perform bed mobility Caryn       Start:  09/27/24    Expected End:  10/11/24            STG - Patient will transfer sit to and from stand Cayrn with LRAD and no acute LOB       Start:  09/27/24    Expected End:  10/11/24               Pain - Adult              Education Documentation  Body Mechanics, taught by Jade Gonsalez PT at 9/27/2024  2:52 PM.  Learner: Family, Patient  Readiness: Acceptance  Method: Explanation, Demonstration  Response: Verbalizes Understanding, Needs Reinforcement  Comment: AD set up, transfer techniques, importance of utilizing FWW to improve stability and safety    Mobility Training, taught by Jade Gonsalez PT at 9/27/2024  2:52 PM.  Learner: Family, Patient  Readiness: Acceptance  Method: Explanation, Demonstration  Response: Verbalizes Understanding, Needs Reinforcement  Comment: AD set up, transfer techniques, importance of utilizing FWW to improve stability and safety    Education Comments  No comments found.        09/27/24 at 2:55 PM - Jade Gonsalez, PT

## 2024-09-27 NOTE — CARE PLAN
Problem: Skin  Goal: Decreased wound size/increased tissue granulation at next dressing change  Outcome: Progressing  Goal: Participates in plan/prevention/treatment measures  Outcome: Progressing  Goal: Prevent/manage excess moisture  Outcome: Progressing  Goal: Prevent/minimize sheer/friction injuries  Outcome: Progressing  Goal: Promote/optimize nutrition  Outcome: Progressing  Goal: Promote skin healing  Outcome: Progressing     Problem: Diabetes  Goal: Achieve decreasing blood glucose levels by end of shift  Outcome: Progressing  Goal: Increase stability of blood glucose readings by end of shift  Outcome: Progressing  Goal: Decrease in ketones present in urine by end of shift  Outcome: Progressing  Goal: Maintain electrolyte levels within acceptable range throughout shift  Outcome: Progressing  Goal: Maintain glucose levels >70mg/dl to <250mg/dl throughout shift  Outcome: Progressing  Goal: No changes in neurological exam by end of shift  Outcome: Progressing  Goal: Learn about and adhere to nutrition recommendations by end of shift  Outcome: Progressing  Goal: Vital signs within normal range for age by end of shift  Outcome: Progressing  Goal: Increase self care and/or family involovement by end of shift  Outcome: Progressing  Goal: Receive DSME education by end of shift  Outcome: Progressing     Problem: Pain - Adult  Goal: Verbalizes/displays adequate comfort level or baseline comfort level  Outcome: Progressing     Problem: Safety - Adult  Goal: Free from fall injury  Outcome: Progressing     Problem: Discharge Planning  Goal: Discharge to home or other facility with appropriate resources  Outcome: Progressing     Problem: Chronic Conditions and Co-morbidities  Goal: Patient's chronic conditions and co-morbidity symptoms are monitored and maintained or improved  Outcome: Progressing   The patient's goals for the shift include      The clinical goals for the shift include Patient will remain HDS throughout  this shift

## 2024-09-30 ENCOUNTER — APPOINTMENT (OUTPATIENT)
Dept: PRIMARY CARE | Facility: CLINIC | Age: 69
End: 2024-09-30
Payer: MEDICARE

## 2024-09-30 ENCOUNTER — DOCUMENTATION (OUTPATIENT)
Dept: PRIMARY CARE | Facility: CLINIC | Age: 69
End: 2024-09-30

## 2024-09-30 ENCOUNTER — PATIENT OUTREACH (OUTPATIENT)
Dept: PRIMARY CARE | Facility: CLINIC | Age: 69
End: 2024-09-30

## 2024-09-30 NOTE — PROGRESS NOTES
Discharge Facility: [facility]  Discharge Diagnosis: Lung Nodule  Admission Date:  09/26/2024  Discharge Date: 09/27/2024    PCP Appointment Date: Patients sister Jesica ruffin  Specialist Appointment Date: ENT 10/15/2024, Cardiac Surg 10/21/2024, Cardio 12/11/2024, Hem/Onc 12/17/2024  Hospital Encounter and Summary Linked: Yes    See discharge assessment below for further details    Medications  Medications reviewed with patient/caregiver?: Yes (9/30/2024 12:47 PM)  Is the patient having any side effects they believe may be caused by any medication additions or changes?: No (9/30/2024 12:47 PM)  Does the patient have all medications ordered at discharge?: Yes (9/30/2024 12:47 PM)  Prescription Comments: Scripts given at discharge for Tylenol and Oxycodone (9/30/2024 12:47 PM)  Is the patient taking all medications as directed (includes completed medication regime)?: Yes (9/30/2024 12:47 PM)  Medication Comments: Patients sister denies any issues obtainin or affording medication (9/30/2024 12:47 PM)    Appointments  Does the patient have a primary care provider?: Yes (9/30/2024 12:47 PM)  Care Management Interventions: -- (Patients sister Jesica declined) (9/30/2024 12:47 PM)  Has the patient kept scheduled appointments due by today?: Yes (9/30/2024 12:47 PM)    Self Management  What is the home health agency?: N/A (9/30/2024 12:47 PM)  What Durable Medical Equipment (DME) was ordered?: N/A (9/30/2024 12:47 PM)    Patient Teaching  Does the patient have access to their discharge instructions?: Yes (9/30/2024 12:47 PM)  Care Management Interventions: Reviewed instructions with patient (9/30/2024 12:47 PM)  What is the patient's perception of their health status since discharge?: Improving (9/30/2024 12:47 PM)  Is the patient/caregiver able to teach back the hierarchy of who to call/visit for symptoms/problems? PCP, Specialist, Home Health nurse, Urgent Care, ED, 911: Yes (9/30/2024 12:47 PM)  Patient/Caregiver Education  Comments: CURTIS spoke to patients arleen Mooney via phone. She states that he is doing well at home. His post-op pain is tolerable and managed well with Tylenol. She has concerns of elevated blood glucose levels stating that they are in the low 200;s now. She will be calling Ednocrinology today for recommendations. Jesica has declined PCP follow up appointment at this time. She has no questions at this time and was thankful for this call. (9/30/2024 12:47 PM)

## 2024-10-02 ENCOUNTER — TELEPHONE (OUTPATIENT)
Dept: CARDIOLOGY | Facility: CLINIC | Age: 69
End: 2024-10-02
Payer: MEDICARE

## 2024-10-03 DIAGNOSIS — E11.9 DIABETES MELLITUS TYPE 2 WITHOUT RETINOPATHY (MULTI): ICD-10-CM

## 2024-10-03 DIAGNOSIS — E11.9 TYPE 2 DIABETES MELLITUS: Primary | ICD-10-CM

## 2024-10-03 RX ORDER — GLIMEPIRIDE 4 MG/1
4 TABLET ORAL
Qty: 90 TABLET | Refills: 3 | Status: SHIPPED | OUTPATIENT
Start: 2024-10-03 | End: 2025-10-03

## 2024-10-03 RX ORDER — GLIMEPIRIDE 4 MG/1
4 TABLET ORAL
COMMUNITY
End: 2024-10-03 | Stop reason: SDUPTHER

## 2024-10-04 ENCOUNTER — OFFICE VISIT (OUTPATIENT)
Dept: PRIMARY CARE | Facility: CLINIC | Age: 69
End: 2024-10-04
Payer: MEDICARE

## 2024-10-04 ENCOUNTER — TELEPHONE (OUTPATIENT)
Dept: PRIMARY CARE | Facility: CLINIC | Age: 69
End: 2024-10-04

## 2024-10-04 VITALS
HEART RATE: 93 BPM | SYSTOLIC BLOOD PRESSURE: 94 MMHG | WEIGHT: 151 LBS | OXYGEN SATURATION: 95 % | BODY MASS INDEX: 24.37 KG/M2 | DIASTOLIC BLOOD PRESSURE: 52 MMHG

## 2024-10-04 DIAGNOSIS — G80.8 OTHER CEREBRAL PALSY: ICD-10-CM

## 2024-10-04 DIAGNOSIS — J43.8 OTHER EMPHYSEMA (MULTI): ICD-10-CM

## 2024-10-04 DIAGNOSIS — E11.65 TYPE 2 DIABETES MELLITUS WITH HYPERGLYCEMIA, WITHOUT LONG-TERM CURRENT USE OF INSULIN: Primary | ICD-10-CM

## 2024-10-04 DIAGNOSIS — L03.313 CELLULITIS OF CHEST WALL: Primary | ICD-10-CM

## 2024-10-04 LAB — POC HEMOGLOBIN A1C: 6.9 % (ref 4.2–6.5)

## 2024-10-04 PROCEDURE — 99496 TRANSJ CARE MGMT HIGH F2F 7D: CPT | Performed by: PHYSICIAN ASSISTANT

## 2024-10-04 PROCEDURE — 3074F SYST BP LT 130 MM HG: CPT | Performed by: PHYSICIAN ASSISTANT

## 2024-10-04 PROCEDURE — 1159F MED LIST DOCD IN RCRD: CPT | Performed by: PHYSICIAN ASSISTANT

## 2024-10-04 PROCEDURE — 3078F DIAST BP <80 MM HG: CPT | Performed by: PHYSICIAN ASSISTANT

## 2024-10-04 PROCEDURE — 4010F ACE/ARB THERAPY RXD/TAKEN: CPT | Performed by: PHYSICIAN ASSISTANT

## 2024-10-04 PROCEDURE — 3048F LDL-C <100 MG/DL: CPT | Performed by: PHYSICIAN ASSISTANT

## 2024-10-04 PROCEDURE — 4004F PT TOBACCO SCREEN RCVD TLK: CPT | Performed by: PHYSICIAN ASSISTANT

## 2024-10-04 PROCEDURE — 1111F DSCHRG MED/CURRENT MED MERGE: CPT | Performed by: PHYSICIAN ASSISTANT

## 2024-10-04 PROCEDURE — 1125F AMNT PAIN NOTED PAIN PRSNT: CPT | Performed by: PHYSICIAN ASSISTANT

## 2024-10-04 PROCEDURE — 1123F ACP DISCUSS/DSCN MKR DOCD: CPT | Performed by: PHYSICIAN ASSISTANT

## 2024-10-04 PROCEDURE — 1157F ADVNC CARE PLAN IN RCRD: CPT | Performed by: PHYSICIAN ASSISTANT

## 2024-10-04 PROCEDURE — 83036 HEMOGLOBIN GLYCOSYLATED A1C: CPT | Performed by: PHYSICIAN ASSISTANT

## 2024-10-04 RX ORDER — CEPHALEXIN 500 MG/1
500 CAPSULE ORAL EVERY 12 HOURS SCHEDULED
Status: SHIPPED | OUTPATIENT
Start: 2024-10-04 | End: 2024-10-09

## 2024-10-04 ASSESSMENT — ENCOUNTER SYMPTOMS
WHEEZING: 0
CHILLS: 0
LIGHT-HEADEDNESS: 0
SHORTNESS OF BREATH: 0
FEVER: 0
COUGH: 1
DIFFICULTY URINATING: 0
FATIGUE: 1
FREQUENCY: 1

## 2024-10-04 ASSESSMENT — PAIN SCALES - GENERAL: PAINLEVEL: 3

## 2024-10-04 NOTE — PROGRESS NOTES
"Patient: Benjie Wtaers  : 1955  PCP: Meseret De La Torre PA-C  MRN: [id number]  Program: Transitional Care Management  Status: Enrolled  Effective Dates: 2024 - present  Responsible Staff: Diamond Corral  Social Determinants to be Addressed: Alcohol Use, Physical Activity, Social Connections, Stress, Tobacco Use    Is on Jardiance  and just started  Glimepiride yesterday. Was told to stop the Jardiance and take the Metformin  and glimepiride.   sister states he has been more tired even with trying to walk to go to the bathroom.  Blood pressure has been on the low side which has been checking in the evening.  Morning sugars are around 200-212.  Benjie Waters is a 68 y.o. male presenting today for follow-up after being discharged from the hospital 7 days ago. The main problem requiring admission was lung surgery. The discharge summary and/or Transitional Care Management documentation was reviewed. Medication reconciliation was performed as indicated via the \"Andrés as Reviewed\" timestamp.   He also has not been smoking since his surgery  Benjie Waters was contacted by Transitional Care Management services two days after his discharge. This encounter and supporting documentation was reviewed.  Cut Losartan in half.  Review of Systems   Constitutional:  Positive for fatigue. Negative for chills and fever.   Respiratory:  Positive for cough. Negative for shortness of breath and wheezing.    Genitourinary:  Positive for frequency and urgency. Negative for difficulty urinating.   Neurological:  Negative for light-headedness.       BP 94/52   Pulse 93   Wt 68.5 kg (151 lb)   SpO2 95%   BMI 24.37 kg/m²     Physical Exam  Constitutional:       Comments: Is blind   Cardiovascular:      Rate and Rhythm: Normal rate and regular rhythm.      Pulses: Normal pulses.   Pulmonary:      Effort: Pulmonary effort is normal. No respiratory distress.      Breath sounds: No wheezing.   Neurological:      General: No " focal deficit present.      Mental Status: He is alert. Mental status is at baseline.         The complexity of medical decision making for this patient's transitional care is moderate.    Assessment/Plan   Diagnoses and all orders for this visit:  Type 2 diabetes mellitus with hyperglycemia, without long-term current use of insulin  -     POCT glycosylated hemoglobin (Hb A1C) manually resulted  Other emphysema (Multi)  Other cerebral palsy  Other orders  -     Follow Up In Primary Care - Established; Future  He will follow-up with his pulmonologist here in the next few weeks.  Also gave him a sample of a Dexcom sensor have him follow-up with Hien here in a few weeks for recheck of that.  Will initiate the glimepiride with metformin in the morning and Jardiance in the afternoon or with dinner.  Also discussed monitoring carb intake as well.  Will decrease his losartan in half but if it is not an option we will switch to a different medication for his kidney protection.

## 2024-10-04 NOTE — TELEPHONE ENCOUNTER
Called his sister to get more information/clarify. His blood pressure is low not his O2. 98/64 yesterday, 98/62 today. His blood sugars have also been elevated. Dr. Sandoval, his cardiologist, started him back on Glimepiride yesterday and had him stop Jardiance. He is on losartan for his proteinuria, but it is dropping his BP. Patient is not in any acute distress at this time per sister.

## 2024-10-04 NOTE — PROGRESS NOTES
CARDIOTHORACIC SURGERY FOLLOW-UP PROGRESS NOTE      Patient is s/p robot assisted RUL wedge biopsy with lymph node sampling on 9/26/24.  Patients sister called stating his site where the ct was is red and warm with some drainage on his [place] shirt. No fevers.   I had her send me a picture. It is erythematous around the incision. Reviewed with  and will place order for keflex 500 po BID x 5 days. Will also provide betadine swabs to cleanse area BID for 7 days.   Requested sister call me Monday with report on wound.       Assessment/Plan   Diagnoses and all orders for this visit:  Cellulitis of chest wall  -     cephalexin (Keflex) capsule 500 mg      Meseret Weir PA-C

## 2024-10-05 ENCOUNTER — TELEPHONE (OUTPATIENT)
Dept: CARDIOTHORACIC SURGERY | Facility: HOSPITAL | Age: 69
End: 2024-10-05
Payer: MEDICARE

## 2024-10-05 NOTE — TELEPHONE ENCOUNTER
Spoke with patient's family member, Jesica Justice, on the phone around 2:30 PM. Reports patient has some superficial redness and drainage at his  prior chest tube site. She had sent pictures and talked with a provider named Meseret (per chart review, Meseret Weir) at the Thoracic surgery office who had instructed that Benjie be started on Keflex 500 BID for 5 days. Unfortunately, antibiotic was never routed to patient's pharmacy.    Discussed precautions with Jesica that should prompt her taking Benjie to an ED including increasing redness or drainage, an odor from the wound, or any signs of systemic illness (fevers, lethargy or chills). She agreed. Was encouraged to reach back out to the office early in the week.     Keflex 500 BID for 5 days called into patient's pharmacy.     Camryn Grover, PGY2  General Surgery

## 2024-10-08 ENCOUNTER — PATIENT OUTREACH (OUTPATIENT)
Dept: PRIMARY CARE | Facility: CLINIC | Age: 69
End: 2024-10-08
Payer: MEDICARE

## 2024-10-08 NOTE — PROGRESS NOTES
Call regarding appt. with PCP on 10/04/2024 after hospitalization.  At time of outreach call the patients sister Jesica feels as if their condition has improved since last visit.  Reviewed the PCP appointment with Jesica and addressed any questions or concerns.

## 2024-10-11 ENCOUNTER — TELEPHONE (OUTPATIENT)
Dept: CARDIAC SURGERY | Facility: CLINIC | Age: 69
End: 2024-10-11
Payer: MEDICARE

## 2024-10-11 LAB
ELECTRONICALLY SIGNED BY: NORMAL
FOCUSED SOLID TUMOR DNA/RNA RESULTS: NORMAL

## 2024-10-11 NOTE — TELEPHONE ENCOUNTER
Patients sister called reporting on right chest incision. Sent picture. Wound has some superficial dehiscence. Discussed with . Continue to clean wound with betadine and cover with dry dressing. Sister will call if wound worsens.

## 2024-10-14 LAB
LAB AP ASR DISCLAIMER: NORMAL
LAB AP BLOCK FOR ADDITIONAL STUDIES: NORMAL
LABORATORY COMMENT REPORT: NORMAL
Lab: NORMAL
PATH REPORT.COMMENTS IMP SPEC: NORMAL
PATH REPORT.FINAL DX SPEC: NORMAL
PATH REPORT.GROSS SPEC: NORMAL
PATH REPORT.RELEVANT HX SPEC: NORMAL
PATH REPORT.TOTAL CANCER: NORMAL
PATHOLOGY SYNOPTIC REPORT: NORMAL

## 2024-10-15 ENCOUNTER — APPOINTMENT (OUTPATIENT)
Dept: OTOLARYNGOLOGY | Facility: CLINIC | Age: 69
End: 2024-10-15
Payer: MEDICARE

## 2024-10-15 ENCOUNTER — OFFICE VISIT (OUTPATIENT)
Dept: CARDIAC SURGERY | Facility: CLINIC | Age: 69
End: 2024-10-15
Payer: MEDICARE

## 2024-10-15 ENCOUNTER — APPOINTMENT (OUTPATIENT)
Dept: AUDIOLOGY | Facility: CLINIC | Age: 69
End: 2024-10-15
Payer: MEDICARE

## 2024-10-15 VITALS — BODY MASS INDEX: 24.59 KG/M2 | HEIGHT: 66 IN | WEIGHT: 153 LBS

## 2024-10-15 VITALS
BODY MASS INDEX: 24.17 KG/M2 | HEART RATE: 84 BPM | HEIGHT: 67 IN | SYSTOLIC BLOOD PRESSURE: 102 MMHG | OXYGEN SATURATION: 97 % | WEIGHT: 154 LBS | DIASTOLIC BLOOD PRESSURE: 71 MMHG | RESPIRATION RATE: 18 BRPM

## 2024-10-15 DIAGNOSIS — H90.6 MIXED CONDUCTIVE AND SENSORINEURAL HEARING LOSS OF BOTH EARS: ICD-10-CM

## 2024-10-15 DIAGNOSIS — F79 INTELLECTUAL DISABILITY: ICD-10-CM

## 2024-10-15 DIAGNOSIS — Z98.890 HISTORY OF LUNG SURGERY: ICD-10-CM

## 2024-10-15 DIAGNOSIS — H90.A31 MIXED CONDUCTIVE AND SENSORINEURAL HEARING LOSS OF RIGHT EAR WITH RESTRICTED HEARING OF LEFT EAR: Primary | ICD-10-CM

## 2024-10-15 DIAGNOSIS — H69.93 DYSFUNCTION OF BOTH EUSTACHIAN TUBES: Primary | ICD-10-CM

## 2024-10-15 DIAGNOSIS — H61.23 BILATERAL IMPACTED CERUMEN: ICD-10-CM

## 2024-10-15 DIAGNOSIS — C34.11 MALIGNANT NEOPLASM OF UPPER LOBE OF RIGHT LUNG (MULTI): Primary | ICD-10-CM

## 2024-10-15 PROCEDURE — 1125F AMNT PAIN NOTED PAIN PRSNT: CPT | Performed by: THORACIC SURGERY (CARDIOTHORACIC VASCULAR SURGERY)

## 2024-10-15 PROCEDURE — 99211 OFF/OP EST MAY X REQ PHY/QHP: CPT | Mod: 24 | Performed by: THORACIC SURGERY (CARDIOTHORACIC VASCULAR SURGERY)

## 2024-10-15 PROCEDURE — 1111F DSCHRG MED/CURRENT MED MERGE: CPT | Performed by: THORACIC SURGERY (CARDIOTHORACIC VASCULAR SURGERY)

## 2024-10-15 PROCEDURE — 4004F PT TOBACCO SCREEN RCVD TLK: CPT | Performed by: OTOLARYNGOLOGY

## 2024-10-15 PROCEDURE — 99213 OFFICE O/P EST LOW 20 MIN: CPT | Performed by: OTOLARYNGOLOGY

## 2024-10-15 PROCEDURE — 1159F MED LIST DOCD IN RCRD: CPT | Performed by: OTOLARYNGOLOGY

## 2024-10-15 PROCEDURE — 3008F BODY MASS INDEX DOCD: CPT | Performed by: OTOLARYNGOLOGY

## 2024-10-15 PROCEDURE — 3074F SYST BP LT 130 MM HG: CPT | Performed by: THORACIC SURGERY (CARDIOTHORACIC VASCULAR SURGERY)

## 2024-10-15 PROCEDURE — 1157F ADVNC CARE PLAN IN RCRD: CPT | Performed by: THORACIC SURGERY (CARDIOTHORACIC VASCULAR SURGERY)

## 2024-10-15 PROCEDURE — 1157F ADVNC CARE PLAN IN RCRD: CPT | Performed by: OTOLARYNGOLOGY

## 2024-10-15 PROCEDURE — 1123F ACP DISCUSS/DSCN MKR DOCD: CPT | Performed by: OTOLARYNGOLOGY

## 2024-10-15 PROCEDURE — 3078F DIAST BP <80 MM HG: CPT | Performed by: THORACIC SURGERY (CARDIOTHORACIC VASCULAR SURGERY)

## 2024-10-15 PROCEDURE — 4010F ACE/ARB THERAPY RXD/TAKEN: CPT | Performed by: THORACIC SURGERY (CARDIOTHORACIC VASCULAR SURGERY)

## 2024-10-15 PROCEDURE — 3048F LDL-C <100 MG/DL: CPT | Performed by: OTOLARYNGOLOGY

## 2024-10-15 PROCEDURE — 1159F MED LIST DOCD IN RCRD: CPT | Performed by: THORACIC SURGERY (CARDIOTHORACIC VASCULAR SURGERY)

## 2024-10-15 PROCEDURE — 3008F BODY MASS INDEX DOCD: CPT | Performed by: THORACIC SURGERY (CARDIOTHORACIC VASCULAR SURGERY)

## 2024-10-15 PROCEDURE — 1111F DSCHRG MED/CURRENT MED MERGE: CPT | Performed by: OTOLARYNGOLOGY

## 2024-10-15 PROCEDURE — 4004F PT TOBACCO SCREEN RCVD TLK: CPT | Performed by: THORACIC SURGERY (CARDIOTHORACIC VASCULAR SURGERY)

## 2024-10-15 PROCEDURE — 3048F LDL-C <100 MG/DL: CPT | Performed by: THORACIC SURGERY (CARDIOTHORACIC VASCULAR SURGERY)

## 2024-10-15 PROCEDURE — 4010F ACE/ARB THERAPY RXD/TAKEN: CPT | Performed by: OTOLARYNGOLOGY

## 2024-10-15 PROCEDURE — 1123F ACP DISCUSS/DSCN MKR DOCD: CPT | Performed by: THORACIC SURGERY (CARDIOTHORACIC VASCULAR SURGERY)

## 2024-10-15 ASSESSMENT — ENCOUNTER SYMPTOMS
CONSTIPATION: 0
STRIDOR: 0
NAUSEA: 0
UNEXPECTED WEIGHT CHANGE: 0
VOMITING: 0
CHOKING: 0
DIAPHORESIS: 0
WHEEZING: 0
ABDOMINAL DISTENTION: 0
NEUROLOGICAL NEGATIVE: 1
APPETITE CHANGE: 0
LOSS OF SENSATION IN FEET: 0
SHORTNESS OF BREATH: 0
FATIGUE: 0
CHEST TIGHTNESS: 0
ABDOMINAL PAIN: 0
COUGH: 0
DIARRHEA: 0
OCCASIONAL FEELINGS OF UNSTEADINESS: 0
DEPRESSION: 0
EYES NEGATIVE: 1
FEVER: 0
ALLERGIC/IMMUNOLOGIC NEGATIVE: 1
CHILLS: 0
PSYCHIATRIC NEGATIVE: 1
ENDOCRINE NEGATIVE: 1
MUSCULOSKELETAL NEGATIVE: 1
PALPITATIONS: 0
HEMATOLOGIC/LYMPHATIC NEGATIVE: 1

## 2024-10-15 ASSESSMENT — LIFESTYLE VARIABLES
HOW OFTEN DO YOU HAVE SIX OR MORE DRINKS ON ONE OCCASION: NEVER
AUDIT-C TOTAL SCORE: 0
HOW MANY STANDARD DRINKS CONTAINING ALCOHOL DO YOU HAVE ON A TYPICAL DAY: PATIENT DOES NOT DRINK
HOW OFTEN DO YOU HAVE A DRINK CONTAINING ALCOHOL: NEVER
SKIP TO QUESTIONS 9-10: 1

## 2024-10-15 ASSESSMENT — PATIENT HEALTH QUESTIONNAIRE - PHQ9
SUM OF ALL RESPONSES TO PHQ9 QUESTIONS 1 AND 2: 0
1. LITTLE INTEREST OR PLEASURE IN DOING THINGS: NOT AT ALL
2. FEELING DOWN, DEPRESSED OR HOPELESS: NOT AT ALL

## 2024-10-15 ASSESSMENT — PAIN SCALES - GENERAL: PAINLEVEL: 3

## 2024-10-15 NOTE — PROGRESS NOTES
Chief Complaint   Patient presents with    Cerumen Impaction     LOV: 7/2024 EAR CLEANING, LEFT TUBE, RIGHT TYM/PERF      HPI  He is here for every 3 month ear cleaning.  He had recent pulmonary wedge resection and lymph node sampling for what turned out to be a lung cancer.  Benjie Waters is a 69 y.o. male with recurrent cerumen impactions. He is s/p left tube placed March 23, 2022 and a right perforation. 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   History:  with severe MR. He is status post left myringotomy that has healed and right tympanic membrane perforation. He has a chronic eustachian tube dysfunction bilaterally. He smokes. He is using Allegra for his allergies      Past Medical History:   Diagnosis Date    Anemia     Aphakia, unspecified eye 10/04/2022    Aphakia    Arthritis     Cataract     Cerebral palsy     COPD (chronic obstructive pulmonary disease) (Multi)     Coronary artery disease     Dry eye syndrome of bilateral lacrimal glands 12/27/2017    Dry eye syndrome, bilateral    GERD (gastroesophageal reflux disease)     Glaucoma     Hearing aid worn     HL (hearing loss)     Hyperlipidemia     Ischemic optic neuropathy, unspecified eye 10/04/2022    AION (anterior ischemic optic neuropathy)    Lung nodule     right    Personal history of other infectious and parasitic diseases     History of athlete's foot    Shortness of breath     Skin cancer of nose     Smoker     Tinea unguium     Mycotic toenails    Type 2 diabetes mellitus             Medications:     Current Outpatient Medications:     acetaminophen (Tylenol) 325 mg tablet, Take 3 tablets (975 mg) by mouth every 8 hours., Disp: , Rfl:     artificial tears, dextran-hypomel-glycerin, 0.1-0.3-0.2 % ophthalmic solution, Administer 1 drop into affected eye(s) 4 times a day., Disp: , Rfl:     ascorbic acid (Vitamin C) 1,000 mg tablet, Take 1 tablet (1,000 mg) by mouth once daily., Disp: , Rfl:     aspirin 81 mg EC  "tablet, Take 1 tablet (81 mg) by mouth once daily., Disp: , Rfl:     atorvastatin (Lipitor) 40 mg tablet, Take 1 tablet (40 mg) by mouth once daily., Disp: 90 tablet, Rfl: 1    blood sugar diagnostic (FreeStyle Lite Strips) strip, Use once daily and as needed, Disp: 100 each, Rfl: 3    brimonidine (AlphaGAN) 0.2 % ophthalmic solution, Administer 1 drop into both eyes 2 times a day., Disp: 15 mL, Rfl: 3    empagliflozin (Jardiance) 10 mg, Take 1 tablet (10 mg) by mouth once daily., Disp: 90 tablet, Rfl: 3    fexofenadine (Allegra Allergy) 180 mg tablet, Take 1 tablet (180 mg) by mouth once daily as needed. TAKE PER DIRECTED, Disp: , Rfl:     flaxseed oiL 1,000 mg capsule, Take 1 capsule (1,000 mg) by mouth once daily. TAKE PER DIECTED, Disp: , Rfl:     glimepiride (Amaryl) 4 mg tablet, Take 1 tablet (4 mg) by mouth once daily in the morning. Take before meals., Disp: 90 tablet, Rfl: 3    losartan (Cozaar) 25 mg tablet, TAKE 1 TABLET BY MOUTH EVERY DAY, Disp: 90 tablet, Rfl: 1    metFORMIN  mg 24 hr tablet, TAKE 2 TABLETS (1000 MG) BY MOUTH TWICE A DAY, Disp: 360 tablet, Rfl: 1    montelukast (Singulair) 10 mg tablet, TAKE 1 TABLET BY MOUTH EVERY DAY, Disp: 90 tablet, Rfl: 1    oxyCODONE (Roxicodone) 5 mg immediate release tablet, Take 1 tablet (5 mg) by mouth every 6 hours if needed (pain)., Disp: 20 tablet, Rfl: 0    pantoprazole (ProtoNix) 40 mg EC tablet, Take 1 tablet (40 mg) by mouth once daily., Disp: 90 tablet, Rfl: 0     Allergies:  Allergies   Allergen Reactions    Bacitracin Unknown    Erythromycin Unknown    Latex Unknown        Physical Exam:  Last Recorded Vitals  Height 1.676 m (5' 6\"), weight 69.4 kg (153 lb).  []General appearance: Well-developed, well-nourished in no acute distress, conversant with normal voice quality    Head/face: No erythema or edema or facial tenderness, and normal facial nerve function bilaterally    External ear: Clear external auditory canals with normal pinnae, " bilateral cerumen impactions removed  Tube status: Left T-tube in place  Middle ear: Large right tympanic membrane perforation.  Left middle ear mucosa looks normal  Tympanic membrane perforation: N/A  Mastoid bowl: N/A  Hearing: Normal conversational awareness at normal speech thresholds    Nose visualized using: Anterior rhinoscopy  Nasal dorsum: Nontraumatic midline appearance  Septum: Midline, nonobstructing  Inferior turbinates: Normal, pink  Secretions: Dry    Oral cavity and oropharynx: Normal  Teeth: Good condition  Floor of mouth: without lesions  Palate: Normal hard palate, soft palate and uvula  Oropharynx: Clear, no lesions present  Buccal mucosa: Normal without masses or lesions  Lips: Normal    Nasopharynx: Inadequate mirror exam secondary to gag/anatomy    Neck:  Salivary glands: Normal bilateral parotid and submandibular glands by inspection and palpation.  Non-thyroid masses: No palpable masses or significant lymphadenopathy  Trachea: Midline  Thyroid: No thyromegaly or palpable nodules  Temporomandibular joint: Nontender  Cervical range of motion: Normal    Neurologic exam: Alert and oriented x3, appropriate affect.  Cranial nerves II-XII normal bilaterally  Extraocular movement: Extraocular movement intact, normal gaze alignment    Assessment/Plan   Encounter Diagnoses   Name Primary?    Dysfunction of both eustachian tubes Yes    Mixed conductive and sensorineural hearing loss of both ears     Intellectual disability     Bilateral impacted cerumen                Ears cleaned.  Recheck in 3 months.  Tube in good position.  Continue with hearing aid use    Lupillo Weinstein MD

## 2024-10-15 NOTE — PROGRESS NOTES
Oozing, warmth at incision site. Tylenol being used for discomfort. Has been cleaning it with betadine swabs twice daiy per last visit instruction

## 2024-10-15 NOTE — PROGRESS NOTES
Subjective   Patient ID: Benjie Waters is a 69 y.o. male who presents for Wound Check (incision).  Wound Check        68-year-old male with developmental delay and cerebral palsy who is a smoker.  He was found to have a suspicious right upper lobe lung nodule was been increasing in size.  This is solid nodule that measures about 11 mm in size.  He has been seen by pulmonology.  Pulmonary function test was attempted but it is hard for him to follow instructions.  It is also hard for him to stay still so obtaining a PET scan is not possible.  Same issues with obtaining an IR guided biopsy of this nodule.  I had a candid discussion with his sister about the options.  1 option is to do nothing and continue follow-up with imaging which she is not interested in the other option is to perform a wedge resection of this lesion.  He does not endorse any shortness of breath and has not complain about anything but I think there is a risk of doing this without having more objective data.  However, I think the nodules and a reasonable location for a wedge resection.  I discussed with her the mechanics of a robotic assisted right upper lobe wedge resection with mediastinal lymph nodes.  [facility].    Update 10/15/2024  He is recovering well from his lung resection.  This lung nodule turned out to be a lung cancer.  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  He had some issues with his most anterior wound that opened up and looked a little red initially.  He was on a short course of antibiotics for this.  The wound today mildly open but does not have any signs of infection there is no redness and no drainage.  Instructed the sister to continue with dry dressings.  I will see him again in 6 months with a CT of the chest.    Review of Systems   Constitutional:  Negative for appetite change, chills,  diaphoresis, fatigue, fever and unexpected weight change.   HENT: Negative.     Eyes: Negative.    Respiratory:  Negative for cough, choking, chest tightness, shortness of breath, wheezing and stridor.    Cardiovascular:  Negative for chest pain, palpitations and leg swelling.   Gastrointestinal:  Negative for abdominal distention, abdominal pain, constipation, diarrhea, nausea and vomiting.   Endocrine: Negative.    Genitourinary: Negative.    Musculoskeletal: Negative.    Skin: Negative.    Allergic/Immunologic: Negative.    Neurological: Negative.    Hematological: Negative.    Psychiatric/Behavioral: Negative.     All other systems reviewed and are negative.      Objective   Physical Exam  Constitutional:       Appearance: Normal appearance.   HENT:      Head: Normocephalic and atraumatic.      Nose: Nose normal.      Mouth/Throat:      Mouth: Mucous membranes are moist.      Pharynx: Oropharynx is clear.   Eyes:      Extraocular Movements: Extraocular movements intact.      Conjunctiva/sclera: Conjunctivae normal.      Pupils: Pupils are equal, round, and reactive to light.   Cardiovascular:      Rate and Rhythm: Normal rate and regular rhythm.      Pulses: Normal pulses.      Heart sounds: Normal heart sounds.   Pulmonary:      Effort: Pulmonary effort is normal. No respiratory distress.      Breath sounds: Normal breath sounds. No stridor. No wheezing, rhonchi or rales.   Chest:      Chest wall: No tenderness.   Abdominal:      General: Abdomen is flat. Bowel sounds are normal.      Palpations: Abdomen is soft.   Musculoskeletal:         General: Normal range of motion.      Cervical back: Normal range of motion and neck supple.   Skin:     General: Skin is warm and dry.      Capillary Refill: Capillary refill takes less than 2 seconds.   Neurological:      General: No focal deficit present.      Mental Status: He is alert and oriented to person, place, and time.         Assessment/Plan   Diagnoses and all  orders for this visit:  Lung nodule  -     New diagnosis of lung cancer after wedge resection.  Follow-up in 6 months with a CT of the chest         Cyrus Johnson MD 10/15/24 10:57 AM

## 2024-10-15 NOTE — PROGRESS NOTES
HEARING AID CHECK     RIGHT: RESOUND OMNIA 588 RECHARGEABLE BTE WITH METAL EARHOOK AND STANDARD EARMOLD  S.N.: [id number]  LEFT: RESOUND OMNIA 588 RECHARGEABLE BTE WITH PLASTIC EARHOOK AND STANDARD EARMOLD  S.N.: [id number]  WARRANTY EXPIRES: 6/26/2023     The patient is here today for a hearing aid clean and check.  His sister reported that Benjie seems to be hearing well with his hearing aids.  Cleaned and dehumidified both hearing aids and vacuumed the microphones.  Replaced the tubing in both earmolds.  The post cleaning listening check revealed good sound quality in both hearing aids.  The patient reported that he could hear well with his hearing aids after today's cleaning.  Recall in six months for a hearing evaluation and hearing aid check.  $30.00.     APPOINTMENT TIME: 9:30-10:00

## 2024-10-21 ENCOUNTER — APPOINTMENT (OUTPATIENT)
Dept: CARDIAC SURGERY | Facility: CLINIC | Age: 69
End: 2024-10-21
Payer: MEDICARE

## 2024-10-22 ENCOUNTER — APPOINTMENT (OUTPATIENT)
Dept: RADIOLOGY | Facility: HOSPITAL | Age: 69
End: 2024-10-22
Payer: MEDICARE

## 2024-10-22 ENCOUNTER — TELEPHONE (OUTPATIENT)
Dept: PRIMARY CARE | Facility: CLINIC | Age: 69
End: 2024-10-22
Payer: MEDICARE

## 2024-10-22 ENCOUNTER — HOSPITAL ENCOUNTER (EMERGENCY)
Facility: HOSPITAL | Age: 69
Discharge: HOME | End: 2024-10-22
Payer: MEDICARE

## 2024-10-22 VITALS
WEIGHT: 154 LBS | HEIGHT: 67 IN | HEART RATE: 81 BPM | SYSTOLIC BLOOD PRESSURE: 130 MMHG | BODY MASS INDEX: 24.17 KG/M2 | OXYGEN SATURATION: 98 % | TEMPERATURE: 97.4 F | RESPIRATION RATE: 16 BRPM | DIASTOLIC BLOOD PRESSURE: 79 MMHG

## 2024-10-22 DIAGNOSIS — E16.2 HYPOGLYCEMIA: Primary | ICD-10-CM

## 2024-10-22 LAB
ANION GAP SERPL CALCULATED.3IONS-SCNC: 11 MMOL/L (ref 10–20)
APPEARANCE UR: CLEAR
BACTERIA #/AREA URNS AUTO: ABNORMAL /HPF
BASOPHILS # BLD AUTO: 0.03 X10*3/UL (ref 0–0.1)
BASOPHILS NFR BLD AUTO: 0.4 %
BILIRUB UR STRIP.AUTO-MCNC: NEGATIVE MG/DL
BUN SERPL-MCNC: 17 MG/DL (ref 6–23)
CALCIUM SERPL-MCNC: 10.6 MG/DL (ref 8.6–10.3)
CHLORIDE SERPL-SCNC: 102 MMOL/L (ref 98–107)
CO2 SERPL-SCNC: 29 MMOL/L (ref 21–32)
COLOR UR: COLORLESS
CREAT SERPL-MCNC: 1.05 MG/DL (ref 0.5–1.3)
EGFRCR SERPLBLD CKD-EPI 2021: 77 ML/MIN/1.73M*2
EOSINOPHIL # BLD AUTO: 0.18 X10*3/UL (ref 0–0.7)
EOSINOPHIL NFR BLD AUTO: 2.6 %
ERYTHROCYTE [DISTWIDTH] IN BLOOD BY AUTOMATED COUNT: 14.5 % (ref 11.5–14.5)
GLUCOSE BLD MANUAL STRIP-MCNC: 103 MG/DL (ref 74–99)
GLUCOSE BLD MANUAL STRIP-MCNC: 57 MG/DL (ref 74–99)
GLUCOSE SERPL-MCNC: 107 MG/DL (ref 74–99)
GLUCOSE UR STRIP.AUTO-MCNC: ABNORMAL MG/DL
HCT VFR BLD AUTO: 34.7 % (ref 41–52)
HGB BLD-MCNC: 11.2 G/DL (ref 13.5–17.5)
IMM GRANULOCYTES # BLD AUTO: 0.02 X10*3/UL (ref 0–0.7)
IMM GRANULOCYTES NFR BLD AUTO: 0.3 % (ref 0–0.9)
KETONES UR STRIP.AUTO-MCNC: NEGATIVE MG/DL
LEUKOCYTE ESTERASE UR QL STRIP.AUTO: ABNORMAL
LYMPHOCYTES # BLD AUTO: 1.56 X10*3/UL (ref 1.2–4.8)
LYMPHOCYTES NFR BLD AUTO: 22.5 %
MCH RBC QN AUTO: 32.6 PG (ref 26–34)
MCHC RBC AUTO-ENTMCNC: 32.3 G/DL (ref 32–36)
MCV RBC AUTO: 101 FL (ref 80–100)
MONOCYTES # BLD AUTO: 0.83 X10*3/UL (ref 0.1–1)
MONOCYTES NFR BLD AUTO: 12 %
MUCOUS THREADS #/AREA URNS AUTO: ABNORMAL /LPF
NEUTROPHILS # BLD AUTO: 4.3 X10*3/UL (ref 1.2–7.7)
NEUTROPHILS NFR BLD AUTO: 62.2 %
NITRITE UR QL STRIP.AUTO: NEGATIVE
NRBC BLD-RTO: 0 /100 WBCS (ref 0–0)
PH UR STRIP.AUTO: 6.5 [PH]
PLATELET # BLD AUTO: 233 X10*3/UL (ref 150–450)
POTASSIUM SERPL-SCNC: 4.4 MMOL/L (ref 3.5–5.3)
PROT UR STRIP.AUTO-MCNC: NEGATIVE MG/DL
RBC # BLD AUTO: 3.44 X10*6/UL (ref 4.5–5.9)
RBC # UR STRIP.AUTO: NEGATIVE /UL
RBC #/AREA URNS AUTO: ABNORMAL /HPF
SODIUM SERPL-SCNC: 138 MMOL/L (ref 136–145)
SP GR UR STRIP.AUTO: 1.01
UROBILINOGEN UR STRIP.AUTO-MCNC: NORMAL MG/DL
WBC # BLD AUTO: 6.9 X10*3/UL (ref 4.4–11.3)
WBC #/AREA URNS AUTO: ABNORMAL /HPF

## 2024-10-22 PROCEDURE — 87086 URINE CULTURE/COLONY COUNT: CPT | Mod: WESLAB

## 2024-10-22 PROCEDURE — 81001 URINALYSIS AUTO W/SCOPE: CPT

## 2024-10-22 PROCEDURE — 85025 COMPLETE CBC W/AUTO DIFF WBC: CPT

## 2024-10-22 PROCEDURE — 82947 ASSAY GLUCOSE BLOOD QUANT: CPT

## 2024-10-22 PROCEDURE — 36415 COLL VENOUS BLD VENIPUNCTURE: CPT

## 2024-10-22 PROCEDURE — 99283 EMERGENCY DEPT VISIT LOW MDM: CPT

## 2024-10-22 PROCEDURE — 71045 X-RAY EXAM CHEST 1 VIEW: CPT | Performed by: RADIOLOGY

## 2024-10-22 PROCEDURE — 80048 BASIC METABOLIC PNL TOTAL CA: CPT

## 2024-10-22 PROCEDURE — 71045 X-RAY EXAM CHEST 1 VIEW: CPT

## 2024-10-22 ASSESSMENT — PAIN SCALES - GENERAL
PAINLEVEL_OUTOF10: 0 - NO PAIN
PAINLEVEL_OUTOF10: 0 - NO PAIN

## 2024-10-22 ASSESSMENT — PAIN - FUNCTIONAL ASSESSMENT: PAIN_FUNCTIONAL_ASSESSMENT: 0-10

## 2024-10-22 NOTE — ED NOTES
Repeat accucheck=57, patient given glass of orange juice.  Wife concerned because Dexcom reading BS=75.     Radha Garg RN  10/22/24 [id number]

## 2024-10-22 NOTE — TELEPHONE ENCOUNTER
Pts daughter called to report that  pts BS was 52 at 1:00, she gave him candy and it did go up to 72. He is having back pain and is leaning when walking . I triaged the call to [facility] , was advised to have daughter call the squad to check him out . She was advised, and will do so .

## 2024-10-23 LAB
BACTERIA UR CULT: ABNORMAL
HOLD SPECIMEN: NORMAL

## 2024-10-23 NOTE — ED PROVIDER NOTES
HPI   Chief Complaint   Patient presents with    Hypoglycemia       Patient is a 69-year-old male with past medical history of type 2 diabetes presenting with concerns for hypoglycemia.  Patient brought in via ambulance.  Report is that he recently started utilizing a Dexcom yesterday in the morning states that it seems to not be in sync because it is constantly alerting that the blood sugar as well as an annual checkup is not low.  She states they only get 1 test strip per day due to insurance issues.  Patient is not having any specific complaints.  Wife feels as if the patient is walking and favoring his left side but patient is not endorsing pain.  Patient denies fevers, chills, cough, sore throat, runny nose, chest pain, shortness of breath, abdominal pain, nausea, vomiting, diarrhea or urinary complaints.              Patient History   Past Medical History:   Diagnosis Date    Anemia     Aphakia, unspecified eye 10/04/2022    Aphakia    Arthritis     Cataract     Cerebral palsy     COPD (chronic obstructive pulmonary disease) (Multi)     Coronary artery disease     Dry eye syndrome of bilateral lacrimal glands 12/27/2017    Dry eye syndrome, bilateral    GERD (gastroesophageal reflux disease)     Glaucoma     Hearing aid worn     HL (hearing loss)     Hyperlipidemia     Ischemic optic neuropathy, unspecified eye 10/04/2022    AION (anterior ischemic optic neuropathy)    Lung nodule     right    Personal history of other infectious and parasitic diseases     History of athlete's foot    Shortness of breath     Skin cancer of nose     Smoker     Tinea unguium     Mycotic toenails    Type 2 diabetes mellitus      Past Surgical History:   Procedure Laterality Date    CATARACT EXTRACTION  04/23/2023    COLONOSCOPY  2021    OTHER SURGICAL HISTORY  04/06/2021    Myringotomy with tube placement     Family History   Problem Relation Name Age of Onset    No Known Problems Mother       Social History     Tobacco Use     Smoking status: Some Days     Average packs/day: 1 pack/day for 52.7 years (52.7 ttl pk-yrs)     Types: Cigarettes     Start date: 1972     Passive exposure: Current    Smokeless tobacco: Never   Vaping Use    Vaping status: Never Used   Substance Use Topics    Alcohol use: Never    Drug use: Never       Physical Exam   ED Triage Vitals   Temperature Heart Rate Respirations BP   10/22/24 1425 10/22/24 1425 10/22/24 1425 10/22/24 1425   36.7 °C (98 °F) 82 16 121/80      Pulse Ox Temp Source Heart Rate Source Patient Position   10/22/24 1425 10/22/24 1425 10/22/24 1535 10/22/24 1535   100 % Axillary Monitor Lying      BP Location FiO2 (%)     10/22/24 1535 --     Right arm        Physical Exam  Vitals and nursing note reviewed.   Constitutional:       Appearance: He is well-developed.      Comments: Awake, sitting in examination bed   HENT:      Head: Normocephalic and atraumatic.      Nose: Nose normal.      Mouth/Throat:      Mouth: Mucous membranes are moist.      Pharynx: Oropharynx is clear.   Eyes:      Extraocular Movements: Extraocular movements intact.      Conjunctiva/sclera: Conjunctivae normal.      Pupils: Pupils are equal, round, and reactive to light.   Cardiovascular:      Rate and Rhythm: Normal rate and regular rhythm.      Pulses: Normal pulses.      Heart sounds: Normal heart sounds. No murmur heard.  Pulmonary:      Effort: Pulmonary effort is normal. No respiratory distress.      Breath sounds: Normal breath sounds.   Abdominal:      General: Abdomen is flat.      Palpations: Abdomen is soft.      Tenderness: There is no abdominal tenderness.   Musculoskeletal:         General: No swelling. Normal range of motion.      Cervical back: Normal range of motion and neck supple.   Skin:     General: Skin is warm and dry.      Capillary Refill: Capillary refill takes less than 2 seconds.   Neurological:      General: No focal deficit present.      Mental Status: He is alert and oriented to person,  place, and time.   Psychiatric:         Mood and Affect: Mood normal.         Behavior: Behavior normal.           ED Course & MDM   ED Course as of 10/23/24 0852   Tue Oct 22, 2024   1733 Patient's sugar was 57, oranges given.  The family states they do not want a wait for a recheck.  Patient will be discharged [AR]      ED Course User Index  [AR] Patrick Leach PA-C         Diagnoses as of 10/23/24 0852   Hypoglycemia                 No data recorded                                 Medical Decision Making  Patient is a 69-year-old male with past medical history of type 2 diabetes presenting with concerns for hyperglycemia.  Lab work, urine, imaging ordered.  Condition considered include but are not limited to: Dexcom malfunction, infection.    CBC is without leukocytosis or signs of anemia with hemoglobin of 11.2.  BMP without significant electrolyte abnormality or renal impairment.  Initial sugar of 103.  UA with micro is without infection.  Chest x-ray without acute cardiopulmonary process.  Repeat sugar was ordered to the patient's Dexcom alerting him of low sugar.  It was 57.  Patient was given orange juice.  I did discuss with the wife that we should recheck show prior to discharge which she states that the low sugars likely related to the fact that the patient has not eaten and she wants to take him home to have lunch.    I believe this patient is at low risk for complication, and a disposition of discharge is acceptable.  Return to the Emergency Department if new or worsening symptoms including headache, fever, chills, chest pain, shortness of breath, syncope, near syncope, abdominal pain, nausea, vomiting,  diarrhea, or worsening pain.  Endocrinology referral given to have patient follow-up with Dexcom.  Patient is agreeable to a disposition of discharge and follow-up with primary care and endocrinology within the next several days.    Portions of this note made with Dragon software, please be mindful  of potential grammatical errors.        Labs Reviewed   CBC WITH AUTO DIFFERENTIAL - Abnormal       Result Value    WBC 6.9      nRBC 0.0      RBC 3.44 (*)     Hemoglobin 11.2 (*)     Hematocrit 34.7 (*)      (*)     MCH 32.6      MCHC 32.3      RDW 14.5      Platelets 233      Neutrophils % 62.2      Immature Granulocytes %, Automated 0.3      Lymphocytes % 22.5      Monocytes % 12.0      Eosinophils % 2.6      Basophils % 0.4      Neutrophils Absolute 4.30      Immature Granulocytes Absolute, Automated 0.02      Lymphocytes Absolute 1.56      Monocytes Absolute 0.83      Eosinophils Absolute 0.18      Basophils Absolute 0.03     BASIC METABOLIC PANEL - Abnormal    Glucose 107 (*)     Sodium 138      Potassium 4.4      Chloride 102      Bicarbonate 29      Anion Gap 11      Urea Nitrogen 17      Creatinine 1.05      eGFR 77      Calcium 10.6 (*)    URINALYSIS WITH REFLEX CULTURE AND MICROSCOPIC - Abnormal    Color, Urine Colorless (*)     Appearance, Urine Clear      Specific Gravity, Urine 1.008      pH, Urine 6.5      Protein, Urine NEGATIVE      Glucose, Urine OVER (4+) (*)     Blood, Urine NEGATIVE      Ketones, Urine NEGATIVE      Bilirubin, Urine NEGATIVE      Urobilinogen, Urine Normal      Nitrite, Urine NEGATIVE      Leukocyte Esterase, Urine 75 [place]/µL (*)     Narrative:     OVER is reported when the result is greater than the clinically reportable range.   MICROSCOPIC ONLY, URINE - Abnormal    WBC, Urine 11-20 (*)     RBC, Urine 1-2      Bacteria, Urine 1+ (*)     Mucus, Urine FEW     POCT GLUCOSE - Abnormal    POCT Glucose 103 (*)    POCT GLUCOSE - Abnormal    POCT Glucose 57 (*)    URINE CULTURE   URINALYSIS WITH REFLEX CULTURE AND MICROSCOPIC    Narrative:     The following orders were created for panel order Urinalysis with Reflex Culture and Microscopic.  Procedure                               Abnormality         Status                     ---------                               -----------          ------                     Urinalysis with Reflex C...[[id number]]  Abnormal            Final result               Extra Urine Gray Tube[[id number]]                            Final result                 Please view results for these tests on the individual orders.   EXTRA URINE GRAY TUBE    Extra Tube Hold for add-ons.     POCT GLUCOSE METER     XR chest 1 view   Final Result   Postsurgical changes right upper lobe. No evidence of acute   intrathoracic abnormality.        Signed by: Quintin Linares 10/22/2024 2:48 PM   Dictation workstation:   [id number]            Procedure  Procedures     Patrick Leach PA-C  10/23/24 0852

## 2024-10-29 ENCOUNTER — OFFICE VISIT (OUTPATIENT)
Dept: ENDOCRINOLOGY | Facility: CLINIC | Age: 69
End: 2024-10-29
Payer: MEDICARE

## 2024-10-29 VITALS
SYSTOLIC BLOOD PRESSURE: 101 MMHG | DIASTOLIC BLOOD PRESSURE: 71 MMHG | TEMPERATURE: 96.9 F | WEIGHT: 152.1 LBS | HEART RATE: 93 BPM | BODY MASS INDEX: 24.44 KG/M2 | HEIGHT: 66 IN

## 2024-10-29 DIAGNOSIS — E11.649 TYPE 2 DIABETES MELLITUS WITH HYPOGLYCEMIA WITHOUT COMA, WITHOUT LONG-TERM CURRENT USE OF INSULIN: Primary | ICD-10-CM

## 2024-10-29 PROCEDURE — 3078F DIAST BP <80 MM HG: CPT | Performed by: NURSE PRACTITIONER

## 2024-10-29 PROCEDURE — 1160F RVW MEDS BY RX/DR IN RCRD: CPT | Performed by: NURSE PRACTITIONER

## 2024-10-29 PROCEDURE — 1159F MED LIST DOCD IN RCRD: CPT | Performed by: NURSE PRACTITIONER

## 2024-10-29 PROCEDURE — 4010F ACE/ARB THERAPY RXD/TAKEN: CPT | Performed by: NURSE PRACTITIONER

## 2024-10-29 PROCEDURE — 3048F LDL-C <100 MG/DL: CPT | Performed by: NURSE PRACTITIONER

## 2024-10-29 PROCEDURE — 3008F BODY MASS INDEX DOCD: CPT | Performed by: NURSE PRACTITIONER

## 2024-10-29 PROCEDURE — 99204 OFFICE O/P NEW MOD 45 MIN: CPT | Performed by: NURSE PRACTITIONER

## 2024-10-29 PROCEDURE — 3074F SYST BP LT 130 MM HG: CPT | Performed by: NURSE PRACTITIONER

## 2024-10-29 PROCEDURE — 4004F PT TOBACCO SCREEN RCVD TLK: CPT | Performed by: NURSE PRACTITIONER

## 2024-10-29 PROCEDURE — 1157F ADVNC CARE PLAN IN RCRD: CPT | Performed by: NURSE PRACTITIONER

## 2024-10-29 PROCEDURE — 1123F ACP DISCUSS/DSCN MKR DOCD: CPT | Performed by: NURSE PRACTITIONER

## 2024-10-29 PROCEDURE — G2211 COMPLEX E/M VISIT ADD ON: HCPCS | Performed by: NURSE PRACTITIONER

## 2024-10-29 PROCEDURE — 95251 CONT GLUC MNTR ANALYSIS I&R: CPT | Performed by: NURSE PRACTITIONER

## 2024-10-29 RX ORDER — LANCETS 33 GAUGE
EACH MISCELLANEOUS
Qty: 100 EACH | Refills: 3 | Status: SHIPPED | OUTPATIENT
Start: 2024-10-29

## 2024-10-29 RX ORDER — BLOOD-GLUCOSE METER
EACH MISCELLANEOUS
Qty: 100 STRIP | Refills: 3 | Status: SHIPPED | OUTPATIENT
Start: 2024-10-29

## 2024-11-01 ENCOUNTER — PATIENT OUTREACH (OUTPATIENT)
Dept: PRIMARY CARE | Facility: CLINIC | Age: 69
End: 2024-11-01

## 2024-11-01 ENCOUNTER — APPOINTMENT (OUTPATIENT)
Dept: PRIMARY CARE | Facility: CLINIC | Age: 69
End: 2024-11-01
Payer: MEDICARE

## 2024-11-06 DIAGNOSIS — K21.9 CHRONIC GERD: ICD-10-CM

## 2024-11-06 RX ORDER — PANTOPRAZOLE SODIUM 40 MG/1
40 TABLET, DELAYED RELEASE ORAL DAILY
Qty: 90 TABLET | Refills: 2 | Status: SHIPPED | OUTPATIENT
Start: 2024-11-06

## 2024-11-12 ENCOUNTER — APPOINTMENT (OUTPATIENT)
Dept: DERMATOLOGY | Facility: CLINIC | Age: 69
End: 2024-11-12
Payer: MEDICARE

## 2024-11-13 NOTE — PROGRESS NOTES
Patient is sent at the request of Shreya Mcnamara, APR* for my opinion regarding diabetes.  My recommendations below will be communicated back to the requesting provider by way of shared medical record.    Recommendations:   No changes made today  ________________________________________________________________________    Subjective   Past Medical History:  Patient Active Problem List   Diagnosis    Allergic conjunctivitis    Bilateral impacted cerumen    Nuclear sclerotic cataract    Cerebral palsy    Corneal edema    Diabetes mellitus type 2 without retinopathy (Multi)    Tear film insufficiency    Dysfunction of both eustachian tubes    Glaucoma of both eyes    High cholesterol    Intellectual disability    Mixed conductive and sensorineural hearing loss of both ears    Myopic degeneration, left eye    Perforation of left tympanic membrane    Primary open angle glaucoma    Gastroesophageal reflux disease    Benign essential HTN    Pulmonary emphysema (Multi)    Hypercalcemia    Anemia of chronic disease    Anterior ischemic optic neuropathy    Snoring    Glaucoma    Hypertension    Intellectual functioning disability    Lipids abnormal    Low back pain    Mixed dyslipidemia    Nicotine dependence, cigarettes, uncomplicated    Obstructive sleep apnea syndrome    Onychomycosis of toenail    Pulmonary nodules    Type 2 diabetes mellitus    Mixed hyperlipidemia    Hyperopia of both eyes    Dyslipidemia    Lung nodule     Interim:  Benjie Waters is a 69 y.o. male presents today for new patient visit with endo PharmD for Type 2 Diabetes Mellitus. Last seen by Shreya Mcnamara on 10/29 where glimepiride stopped d/t frequent lows. Today the patient presents with his sister/caregiver. Reports that glimepiride has been stopped and low blood sugars are much less frequent now. No acute concerns noted.    Diabetes Pharmacotherapy:    Metformin 500 mg - 2 tablets twice daily   Jardiance 10 mg once daily     Previously  trialed meds:   Glimepiride - hypoglycemia    Social:  Current diet: on average, 3 meals per day + snacks  Breakfast - 1 egg + toast or rarely pancakes  Lunch - [employer] sandwich  Dinner - leftover [employer]; chicken breast, stuffing, mashed potatoes, and green beans  Snack - bologna slice, small pack of mini muffins  Fluids - 50% sugar OJ, coffee (black), water, coke zero  Nicotine: pt smoking 3/4 PPD, not interested in smoking cessation at this time    Allergies:  Bacitracin, Erythromycin, and Latex    Medication list:  Current Outpatient Medications   Medication Instructions    acetaminophen (TYLENOL) 975 mg, oral, Every 8 hours    artificial tears, dextran-hypomel-glycerin, 0.1-0.3-0.2 % ophthalmic solution 1 drop, 4 times daily    ascorbic acid (VITAMIN C) 1,000 mg, Daily    aspirin 81 mg, Daily    atorvastatin (LIPITOR) 40 mg, oral, Daily    blood sugar diagnostic (OneTouch Verio test strips) strip Use to check blood sugar once daily    brimonidine (AlphaGAN) 0.2 % ophthalmic solution 1 drop, Both Eyes, 2 times daily    empagliflozin (JARDIANCE) 10 mg, oral, Daily    fexofenadine (ALLEGRA ALLERGY) 180 mg, Daily PRN    flaxseed oiL 1,000 mg, Daily    lancets (OneTouch Delica Plus Lancet) 33 gauge misc Use to check blood sugar once daily    losartan (COZAAR) 25 mg, oral, Daily    metFORMIN XR (GLUCOPHAGE-XR) 1,000 mg, oral, 2 times daily    montelukast (SINGULAIR) 10 mg, oral, Daily    oxyCODONE (ROXICODONE) 5 mg, oral, Every 6 hours PRN    pantoprazole (PROTONIX) 40 mg, oral, Daily        Objective   Last Recorded Vitals:  BP Readings from Last 3 Encounters:   10/29/24 101/71   10/22/24 130/79   10/15/24 102/71     Wt Readings from Last 3 Encounters:   10/29/24 69 kg (152 lb 1.6 oz)   10/22/24 69.9 kg (154 lb)   10/15/24 69.9 kg (154 lb)     BMI Readings from Last 1 Encounters:   10/29/24 24.55 kg/m²      Labs  A1C  Lab Results   Component Value Date    HGBA1C 6.9 (A) 10/04/2024    HGBA1C 6.5 03/18/2024     "HGBA1C 6.8 (H) 10/04/2023     BMP/LFTs  Lab Results   Component Value Date    CREATININE 1.05 10/22/2024    CREATININE 1.26 09/17/2024    CREATININE 1.13 06/11/2024    EGFR 77 10/22/2024    EGFR 62 09/17/2024    EGFR 71 06/11/2024    GLUCOSE 107 (H) 10/22/2024     10/22/2024    K 4.4 10/22/2024     10/22/2024    CALCIUM 10.6 (H) 10/22/2024    CO2 29 10/22/2024    BUN 17 10/22/2024    ALT 12 06/11/2024    AST 14 06/11/2024    ALKPHOS 55 06/11/2024    BILITOT 0.4 06/11/2024     Lipids  Lab Results   Component Value Date    TRIG 80 03/20/2024    CHOL 125 03/20/2024    LDLF 62 06/13/2023    LDLCALC 69 03/20/2024    HDL 39.9 03/20/2024     Urine Albumin Creatinine Ratio  Lab Results   Component Value Date    MICROALBCREA 52.6 (H) 06/13/2023    MICROALBCREA 30.2 (H) 08/10/2022    MICROALBCREA 18.7 02/11/2021     ASCVD risk  The ASCVD Risk score (Andi DK, et al., 2019) failed to calculate for the following reasons:    The valid total cholesterol range is 130 to 320 mg/dL    Additional labs:  No results found for: \"FRUCTOSAMINE\", \"CPEPTIDE\", \"HQL27WP\", \"NTIB\", \"ZNT8A\", \"INSAB\"    Home glucose monitoring:  Hypoglycemia:  <1% low, treats with OJ or fun sized candy        Assessment/Plan   Type 2 Diabetes Mellitus  Goal A1C <7.5% (h/o lows, age, and comorbidities), at goal for the past year. TIR and GMI well controlled, low blood sugars have essentially resolved since stopping glimepiride. Some post prandial spikes noted, discussed with endo at last appt but discussed further today. Given control will continue current regimen for now, however if needed in the future could increase Jardiance. Pt does not like Dexcom CGM, and it is likely not needed at this point so will have pt return to fingersticks.  Plan:  Continue current regimen  Home glucose monitoring:   Patient encouraged to continue SMBG at least 2-3x/week, alternating FBG and 2hr PPBG  A minimum of 72 hours of CGM data was reviewed and used to make " therapy changes.   Education Provided to Patient:   Diet review and educations   Lipid management:   Therapy: High intensity statin   LDL 69 (3/2024)  Smoking cessation:  Pt currently smoking 3/4 PPD  No interest in quitting at this time  Renal:  CKD: stage 2 - GFR 60-89  ACR:  (6/2023)  Renal protective agents: ACEi/ARB and SGLT2i  DM medications are dosed appropriately for renal function  Labs: ACR ordered today  PharmD follow-up: 3 months  Endo follow-up: 4/29/25    Patient agreeable to plan as above, contact information provided for any future questions or concerns.    Noah Handy, PharmD    Type of encounter: in person  Provider on site: Zandra Curiel    Continue all meds under the continuation of care with the referring provider and clinical pharmacy team.

## 2024-11-15 ENCOUNTER — APPOINTMENT (OUTPATIENT)
Dept: ENDOCRINOLOGY | Facility: CLINIC | Age: 69
End: 2024-11-15
Payer: MEDICARE

## 2024-11-15 DIAGNOSIS — E11.649 TYPE 2 DIABETES MELLITUS WITH HYPOGLYCEMIA WITHOUT COMA, WITHOUT LONG-TERM CURRENT USE OF INSULIN: ICD-10-CM

## 2024-11-15 PROCEDURE — 99211 OFF/OP EST MAY X REQ PHY/QHP: CPT

## 2024-11-15 PROCEDURE — 95251 CONT GLUC MNTR ANALYSIS I&R: CPT

## 2024-11-19 DIAGNOSIS — J43.9 PULMONARY EMPHYSEMA, UNSPECIFIED EMPHYSEMA TYPE (MULTI): ICD-10-CM

## 2024-11-19 RX ORDER — MONTELUKAST SODIUM 10 MG/1
10 TABLET ORAL DAILY
Qty: 90 TABLET | Refills: 1 | Status: SHIPPED | OUTPATIENT
Start: 2024-11-19

## 2024-12-10 ENCOUNTER — APPOINTMENT (OUTPATIENT)
Dept: OPHTHALMOLOGY | Facility: CLINIC | Age: 69
End: 2024-12-10
Payer: MEDICARE

## 2024-12-10 DIAGNOSIS — H40.10X2 OPEN-ANGLE GLAUCOMA OF LEFT EYE, MODERATE STAGE, UNSPECIFIED OPEN-ANGLE GLAUCOMA TYPE: Primary | ICD-10-CM

## 2024-12-10 PROCEDURE — 99214 OFFICE O/P EST MOD 30 MIN: CPT | Performed by: OPHTHALMOLOGY

## 2024-12-10 RX ORDER — BRIMONIDINE TARTRATE 2 MG/ML
1 SOLUTION/ DROPS OPHTHALMIC 2 TIMES DAILY
Qty: 10 ML | Refills: 3 | Status: SHIPPED | OUTPATIENT
Start: 2024-12-10 | End: 2025-03-10

## 2024-12-10 RX ORDER — BRIMONIDINE TARTRATE 2 MG/ML
SOLUTION/ DROPS OPHTHALMIC
COMMUNITY
Start: 2024-12-09 | End: 2024-12-10 | Stop reason: SDUPTHER

## 2024-12-10 ASSESSMENT — CONF VISUAL FIELD
OS_INFERIOR_TEMPORAL_RESTRICTION: 1
OD_INFERIOR_NASAL_RESTRICTION: 1
OD_INFERIOR_TEMPORAL_RESTRICTION: 1
OD_SUPERIOR_TEMPORAL_RESTRICTION: 1
OS_INFERIOR_NASAL_RESTRICTION: 1
OS_SUPERIOR_NASAL_RESTRICTION: 1
OD_SUPERIOR_NASAL_RESTRICTION: 1
OS_SUPERIOR_TEMPORAL_RESTRICTION: 1

## 2024-12-10 ASSESSMENT — ENCOUNTER SYMPTOMS
CARDIOVASCULAR NEGATIVE: 0
MUSCULOSKELETAL NEGATIVE: 0
RESPIRATORY NEGATIVE: 0
ALLERGIC/IMMUNOLOGIC NEGATIVE: 0
NEUROLOGICAL NEGATIVE: 0
ENDOCRINE NEGATIVE: 0
PSYCHIATRIC NEGATIVE: 0
CONSTITUTIONAL NEGATIVE: 0
HEMATOLOGIC/LYMPHATIC NEGATIVE: 0
GASTROINTESTINAL NEGATIVE: 0
EYES NEGATIVE: 1

## 2024-12-10 ASSESSMENT — PACHYMETRY
OS_CT(UM): 556
OD_CT(UM): 557

## 2024-12-10 ASSESSMENT — SLIT LAMP EXAM - LIDS
COMMENTS: NORMAL
COMMENTS: NORMAL

## 2024-12-10 ASSESSMENT — VISUAL ACUITY
OD_SC: NLP
OS_SC: HM
METHOD: SNELLEN - LINEAR

## 2024-12-10 ASSESSMENT — CUP TO DISC RATIO
OS_RATIO: 95
OD_RATIO: 99

## 2024-12-10 ASSESSMENT — TONOMETRY
OS_IOP_MMHG: 11
OD_IOP_MMHG: 18

## 2024-12-11 ENCOUNTER — LAB (OUTPATIENT)
Dept: LAB | Facility: CLINIC | Age: 69
End: 2024-12-11
Payer: MEDICARE

## 2024-12-11 ENCOUNTER — OFFICE VISIT (OUTPATIENT)
Dept: CARDIOLOGY | Facility: CLINIC | Age: 69
End: 2024-12-11
Payer: MEDICARE

## 2024-12-11 ENCOUNTER — PATIENT OUTREACH (OUTPATIENT)
Dept: PRIMARY CARE | Facility: CLINIC | Age: 69
End: 2024-12-11
Payer: MEDICARE

## 2024-12-11 DIAGNOSIS — D63.8 ANEMIA OF CHRONIC DISEASE: ICD-10-CM

## 2024-12-11 DIAGNOSIS — I10 BENIGN ESSENTIAL HTN: Primary | ICD-10-CM

## 2024-12-11 LAB
ALBUMIN SERPL BCP-MCNC: 4.2 G/DL (ref 3.4–5)
ALP SERPL-CCNC: 68 U/L (ref 33–136)
ALT SERPL W P-5'-P-CCNC: 12 U/L (ref 10–52)
ANION GAP SERPL CALC-SCNC: 12 MMOL/L (ref 10–20)
AST SERPL W P-5'-P-CCNC: 11 U/L (ref 9–39)
BASOPHILS # BLD AUTO: 0.02 X10*3/UL (ref 0–0.1)
BASOPHILS NFR BLD AUTO: 0.4 %
BILIRUB SERPL-MCNC: 0.5 MG/DL (ref 0–1.2)
BUN SERPL-MCNC: 20 MG/DL (ref 6–23)
CALCIUM SERPL-MCNC: 10.9 MG/DL (ref 8.6–10.3)
CHLORIDE SERPL-SCNC: 102 MMOL/L (ref 98–107)
CO2 SERPL-SCNC: 30 MMOL/L (ref 21–32)
CREAT SERPL-MCNC: 1.11 MG/DL (ref 0.5–1.3)
EGFRCR SERPLBLD CKD-EPI 2021: 72 ML/MIN/1.73M*2
EOSINOPHIL # BLD AUTO: 0.14 X10*3/UL (ref 0–0.7)
EOSINOPHIL NFR BLD AUTO: 2.5 %
ERYTHROCYTE [DISTWIDTH] IN BLOOD BY AUTOMATED COUNT: 13.7 % (ref 11.5–14.5)
FERRITIN SERPL-MCNC: 41 NG/ML (ref 20–300)
GLUCOSE SERPL-MCNC: 158 MG/DL (ref 74–99)
HCT VFR BLD AUTO: 38.1 % (ref 41–52)
HGB BLD-MCNC: 12.4 G/DL (ref 13.5–17.5)
IMM GRANULOCYTES # BLD AUTO: 0.03 X10*3/UL (ref 0–0.7)
IMM GRANULOCYTES NFR BLD AUTO: 0.5 % (ref 0–0.9)
IRON SATN MFR SERPL: 34 % (ref 25–45)
IRON SERPL-MCNC: 108 UG/DL (ref 35–150)
LYMPHOCYTES # BLD AUTO: 1.52 X10*3/UL (ref 1.2–4.8)
LYMPHOCYTES NFR BLD AUTO: 27 %
MCH RBC QN AUTO: 32.5 PG (ref 26–34)
MCHC RBC AUTO-ENTMCNC: 32.5 G/DL (ref 32–36)
MCV RBC AUTO: 100 FL (ref 80–100)
MONOCYTES # BLD AUTO: 0.71 X10*3/UL (ref 0.1–1)
MONOCYTES NFR BLD AUTO: 12.6 %
NEUTROPHILS # BLD AUTO: 3.22 X10*3/UL (ref 1.2–7.7)
NEUTROPHILS NFR BLD AUTO: 57 %
NRBC BLD-RTO: 0 /100 WBCS (ref 0–0)
PLATELET # BLD AUTO: 215 X10*3/UL (ref 150–450)
POTASSIUM SERPL-SCNC: 4.4 MMOL/L (ref 3.5–5.3)
PROT SERPL-MCNC: 7.4 G/DL (ref 6.4–8.2)
RBC # BLD AUTO: 3.81 X10*6/UL (ref 4.5–5.9)
SODIUM SERPL-SCNC: 140 MMOL/L (ref 136–145)
TIBC SERPL-MCNC: 318 UG/DL (ref 240–445)
UIBC SERPL-MCNC: 210 UG/DL (ref 110–370)
VIT B12 SERPL-MCNC: >2000 PG/ML (ref 211–911)
WBC # BLD AUTO: 5.6 X10*3/UL (ref 4.4–11.3)

## 2024-12-11 PROCEDURE — 4010F ACE/ARB THERAPY RXD/TAKEN: CPT | Performed by: INTERNAL MEDICINE

## 2024-12-11 PROCEDURE — 82607 VITAMIN B-12: CPT

## 2024-12-11 PROCEDURE — 3008F BODY MASS INDEX DOCD: CPT | Performed by: INTERNAL MEDICINE

## 2024-12-11 PROCEDURE — 3078F DIAST BP <80 MM HG: CPT | Performed by: INTERNAL MEDICINE

## 2024-12-11 PROCEDURE — 80053 COMPREHEN METABOLIC PANEL: CPT

## 2024-12-11 PROCEDURE — 99214 OFFICE O/P EST MOD 30 MIN: CPT | Performed by: INTERNAL MEDICINE

## 2024-12-11 PROCEDURE — 1159F MED LIST DOCD IN RCRD: CPT | Performed by: INTERNAL MEDICINE

## 2024-12-11 PROCEDURE — 85025 COMPLETE CBC W/AUTO DIFF WBC: CPT

## 2024-12-11 PROCEDURE — 1160F RVW MEDS BY RX/DR IN RCRD: CPT | Performed by: INTERNAL MEDICINE

## 2024-12-11 PROCEDURE — 3048F LDL-C <100 MG/DL: CPT | Performed by: INTERNAL MEDICINE

## 2024-12-11 PROCEDURE — 82728 ASSAY OF FERRITIN: CPT

## 2024-12-11 PROCEDURE — 1157F ADVNC CARE PLAN IN RCRD: CPT | Performed by: INTERNAL MEDICINE

## 2024-12-11 PROCEDURE — 3074F SYST BP LT 130 MM HG: CPT | Performed by: INTERNAL MEDICINE

## 2024-12-11 PROCEDURE — 83540 ASSAY OF IRON: CPT

## 2024-12-11 PROCEDURE — 84075 ASSAY ALKALINE PHOSPHATASE: CPT

## 2024-12-11 PROCEDURE — 1126F AMNT PAIN NOTED NONE PRSNT: CPT | Performed by: INTERNAL MEDICINE

## 2024-12-11 PROCEDURE — 36415 COLL VENOUS BLD VENIPUNCTURE: CPT

## 2024-12-11 PROCEDURE — 1123F ACP DISCUSS/DSCN MKR DOCD: CPT | Performed by: INTERNAL MEDICINE

## 2024-12-11 ASSESSMENT — ENCOUNTER SYMPTOMS
OCCASIONAL FEELINGS OF UNSTEADINESS: 0
DEPRESSION: 0
LOSS OF SENSATION IN FEET: 0

## 2024-12-11 ASSESSMENT — PAIN SCALES - GENERAL: PAINLEVEL_OUTOF10: 0-NO PAIN

## 2024-12-11 NOTE — PROGRESS NOTES
Patient has met target of no readmission for 90 days post hospital discharge and is graduated from Transitional Care Management program at this time.

## 2024-12-11 NOTE — PROGRESS NOTES
Primary Care Physician: Meseret De La Torre PA-C  Date of Visit: 12/11/2024  2:30 PM EST  Location of visit: [facility]     Chief Complaint:   Chief Complaint   Patient presents with    Follow-up    Hyperlipidemia    Hypertension     HPI / Summary:   Benjie Waters is a 69 y.o. male presents for new patient    ROS    Medical History:   He has a past medical history of Anemia, Aphakia, unspecified eye (10/04/2022), Arthritis, Cataract, Cerebral palsy, COPD (chronic obstructive pulmonary disease) (Multi), Coronary artery disease, Dry eye syndrome of bilateral lacrimal glands (12/27/2017), GERD (gastroesophageal reflux disease), Glaucoma, Hearing aid worn, HL (hearing loss), Hyperlipidemia, Ischemic optic neuropathy, unspecified eye (10/04/2022), Lung nodule, Personal history of other infectious and parasitic diseases, Shortness of breath, Skin cancer of nose, Smoker, Tinea unguium, and Type 2 diabetes mellitus.  Surgical Hx:   He has a past surgical history that includes Other surgical history (04/06/2021); Cataract extraction (04/23/2023); and Colonoscopy (2021).   Social Hx:   He reports that he has been smoking cigarettes. He started smoking about 52 years ago. He has a 52.7 pack-year smoking history. He has been exposed to tobacco smoke. He has never used smokeless tobacco. He reports that he does not drink alcohol and does not use drugs.  Family Hx:   His family history includes No Known Problems in his mother.   Allergies:  Allergies   Allergen Reactions    Bacitracin Unknown    Erythromycin Unknown    Latex Unknown     Outpatient Medications:  Current Outpatient Medications   Medication Instructions    acetaminophen (TYLENOL) 975 mg, oral, Every 8 hours    artificial tears, dextran-hypomel-glycerin, 0.1-0.3-0.2 % ophthalmic solution 1 drop, 4 times daily    ascorbic acid (VITAMIN C) 1,000 mg, Daily    aspirin 81 mg, Daily    atorvastatin (LIPITOR) 40 mg, oral, Daily    blood sugar diagnostic  "(OneTouch Verio test strips) strip Use to check blood sugar once daily    brimonidine (AlphaGAN) 0.2 % ophthalmic solution 1 drop, Both Eyes, 2 times daily    empagliflozin (JARDIANCE) 10 mg, oral, Daily    fexofenadine (ALLEGRA ALLERGY) 180 mg, Daily PRN    flaxseed oiL 1,000 mg, Daily    lancets (OneTouch Delica Plus Lancet) 33 gauge misc Use to check blood sugar once daily    losartan (COZAAR) 25 mg, oral, Daily    metFORMIN XR (GLUCOPHAGE-XR) 1,000 mg, oral, 2 times daily    montelukast (SINGULAIR) 10 mg, oral, Daily    oxyCODONE (ROXICODONE) 5 mg, oral, Every 6 hours PRN    pantoprazole (PROTONIX) 40 mg, oral, Daily     Physical Exam:  Vitals:    12/11/24 1433   BP: 103/71   BP Location: Right arm   Patient Position: Sitting   BP Cuff Size: Adult   Pulse: 80   SpO2: 97%   Weight: 66.5 kg (146 lb 9 oz)   Height: 1.676 m (5' 6\")     Wt Readings from Last 5 Encounters:   12/11/24 66.5 kg (146 lb 9 oz)   10/29/24 69 kg (152 lb 1.6 oz)   10/22/24 69.9 kg (154 lb)   10/15/24 69.9 kg (154 lb)   10/15/24 69.4 kg (153 lb)     Physical Exam  JVP not elevated. Carotid impulses are 2+ without overlying bruit.   Chest exhibits fair to good air movement with completely clear breath sounds.   The cardiac rhythm is regular with no premature beats.   Normal S1 and S2. No gallop, murmur or rub, or click.   Abdomen is soft and benign without focal tenderness.   With no lower leg edema. The pedal pulses are intact.     Last Labs:  Lab on 12/11/2024   Component Date Value    WBC 12/11/2024 5.6     nRBC 12/11/2024 0.0     RBC 12/11/2024 3.81 (L)     Hemoglobin 12/11/2024 12.4 (L)     Hematocrit 12/11/2024 38.1 (L)     MCV 12/11/2024 100     MCH 12/11/2024 32.5     MCHC 12/11/2024 32.5     RDW 12/11/2024 13.7     Platelets 12/11/2024 215     Neutrophils % 12/11/2024 57.0     Immature Granulocytes %,* 12/11/2024 0.5     Lymphocytes % 12/11/2024 27.0     Monocytes % 12/11/2024 12.6     Eosinophils % 12/11/2024 2.5     Basophils % " 12/11/2024 0.4     Neutrophils Absolute 12/11/2024 3.22     Immature Granulocytes Ab* 12/11/2024 0.03     Lymphocytes Absolute 12/11/2024 1.52     Monocytes Absolute 12/11/2024 0.71     Eosinophils Absolute 12/11/2024 0.14     Basophils Absolute 12/11/2024 0.02     Glucose 12/11/2024 158 (H)     Sodium 12/11/2024 140     Potassium 12/11/2024 4.4     Chloride 12/11/2024 102     Bicarbonate 12/11/2024 30     Anion Gap 12/11/2024 12     Urea Nitrogen 12/11/2024 20     Creatinine 12/11/2024 1.11     eGFR 12/11/2024 72     Calcium 12/11/2024 10.9 (H)     Albumin 12/11/2024 4.2     Alkaline Phosphatase 12/11/2024 68     Total Protein 12/11/2024 7.4     AST 12/11/2024 11     Bilirubin, Total 12/11/2024 0.5     ALT 12/11/2024 12     Ferritin 12/11/2024 41     Iron 12/11/2024 108     UIBC 12/11/2024 210     TIBC 12/11/2024 318     % Saturation 12/11/2024 34     Vitamin B12 12/11/2024 >2,000 (H)    Admission on 10/22/2024, Discharged on 10/22/2024   Component Date Value    WBC 10/22/2024 6.9     nRBC 10/22/2024 0.0     RBC 10/22/2024 3.44 (L)     Hemoglobin 10/22/2024 11.2 (L)     Hematocrit 10/22/2024 34.7 (L)     MCV 10/22/2024 101 (H)     MCH 10/22/2024 32.6     MCHC 10/22/2024 32.3     RDW 10/22/2024 14.5     Platelets 10/22/2024 233     Neutrophils % 10/22/2024 62.2     Immature Granulocytes %,* 10/22/2024 0.3     Lymphocytes % 10/22/2024 22.5     Monocytes % 10/22/2024 12.0     Eosinophils % 10/22/2024 2.6     Basophils % 10/22/2024 0.4     Neutrophils Absolute 10/22/2024 4.30     Immature Granulocytes Ab* 10/22/2024 0.02     Lymphocytes Absolute 10/22/2024 1.56     Monocytes Absolute 10/22/2024 0.83     Eosinophils Absolute 10/22/2024 0.18     Basophils Absolute 10/22/2024 0.03     Glucose 10/22/2024 107 (H)     Sodium 10/22/2024 138     Potassium 10/22/2024 4.4     Chloride 10/22/2024 102     Bicarbonate 10/22/2024 29     Anion Gap 10/22/2024 11     Urea Nitrogen 10/22/2024 17     Creatinine 10/22/2024 1.05     eGFR  10/22/2024 77     Calcium 10/22/2024 10.6 (H)     Color, Urine 10/22/2024 Colorless (N)     Appearance, Urine 10/22/2024 Clear     Specific Gravity, Urine 10/22/2024 1.008     pH, Urine 10/22/2024 6.5     Protein, Urine 10/22/2024 NEGATIVE     Glucose, Urine 10/22/2024 OVER (4+) (A)     Blood, Urine 10/22/2024 NEGATIVE     Ketones, Urine 10/22/2024 NEGATIVE     Bilirubin, Urine 10/22/2024 NEGATIVE     Urobilinogen, Urine 10/22/2024 Normal     Nitrite, Urine 10/22/2024 NEGATIVE     Leukocyte Esterase, Urine 10/22/2024 75 [place]/µL (A)     Extra Tube 10/22/2024 Hold for add-ons.     POCT Glucose 10/22/2024 103 (H)     WBC, Urine 10/22/2024 11-20 (A)     RBC, Urine 10/22/2024 1-2     Bacteria, Urine 10/22/2024 1+ (A)     Mucus, Urine 10/22/2024 FEW     Urine Culture 10/22/2024 Multiple organisms present, probable contamination. Repeat culture if clinically indicated. (A)     POCT Glucose 10/22/2024 57 (L)    Office Visit on 10/04/2024   Component Date Value    POC HEMOGLOBIN A1c 10/04/2024 6.9 (A)    Admission on 09/26/2024, Discharged on 09/27/2024   Component Date Value    POCT Glucose 09/26/2024 202 (H)     ABO TYPE 09/26/2024 A     Rh TYPE 09/26/2024 POS     Case Report 09/26/2024                      Value:Surgical Pathology                                Case: [id number]                                  Authorizing Provider:  Cyrus Johnson MD Collected:           09/26/2024 0801              Ordering Location:     [facility]       Received:            09/26/2024 0809                                     [facility] OR                                                             Pathologist:           Meghan Peguero DO                                                          Intraop:               Milla Youngblood MD                                                            Specimens:   A) - LUNG WEDGE RESECTION RIGHT (SPECIFY SITE), right upper lobe wedge                              B) - LYMPH  NODE PULMONARY (SPECIFY SITE), level 8                                                   C) - LYMPH NODE PULMONARY (SPECIFY SITE), level 9                                                   D) - LYMPH NODE PULMONARY (SPECIFY SITE), level 10                                                                            E) - LYMPH NODE PULMONARY (SPECIFY SITE), level 7                                                   F) - LYMPH NODE PULMONARY (SPECIFY SITE), level 4R                                                  G) - LUNG WEDGE RESECTION RIGHT (SPECIFY SITE), right upper lobe wedge #2                  FINAL DIAGNOSIS 09/26/2024                      Value:  A.  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  See note and cancer synoptic report.  - Tumor extends to inked parenchymal margin, see additional lung wedge resection (Part G).  - One lymph node with no evidence of carcinoma (0/1).  - Background lung with emphysematous changes, respiratory bronchiolitis and occasional foci or organizing pneumonia.     Note: Tumor extending to margin is best seen in frozen section slide (A1). By immunohistochemical staining, tumor cells are positive for p40 and negative for TTF-1. PD-L1 immunostain results are reported below. Molecular studies will be ordered and results will be reported in an addendum.    B.  LYMPH NODE, LEVEL 8, EXCISION:   - One lymph node with no evidence of carcinoma (0/1).    C.  LYMPH NODE, LEVEL 9, EXCISION:   - One lymph node with no evidence of carcinoma (0/1).    D.  LYMPH NODE, LEVEL 10, EXCISION:   - Fragments of lymph node with                           no evidence of carcinoma (0/1).    E.  LYMPH NODE, LEVEL 7, EXCISION:   - Fragments of lymph node with no evidence of carcinoma (0/1).    F.  LYMPH NODE, LEVEL 4R, EXCISION:   - One lymph node with no evidence of carcinoma (0/1).    G.  LUNG, RIGHT UPPER LOBE, #2  WEDGE RESECTION:  - Negative for carcinoma; lung parenchyma.    Johnathan          09/26/2024                      Value:By the signature on this report, the individual or group listed as making the Final Interpretation/Diagnosis certifies that they have reviewed this case.       Comment 09/26/2024                      Value:PD-L1 22C3  by Immunohistochemistry with Interpretation, pembrolizumab  (KEYTRUDA)    Block used:  A4    Interpretation: Low Expression    Tumor Proportion Score (TPS): 5%      Intended use:  PD-L1 22C3 by IHC with Interpretation is a qualitative immunohistochemical assay using Monoclonal Mouse Anti-PD-L1, Clone 22C3 intended for use in the detection of PD-L1 protein in formalin-fixed, paraffin-embedded (FFPE) non-small cell lung cancer (NSCLC) tissue using the Optiview detection on a [employer] BenchMark Ultra. The specimen submitted for testing should contain at least 100 viable tumor cells to be considered adequate for evaluation. This assay is indicated as an aid in identifying NSCLC patients for treatment with pembrolizumab (KEYTRUDA).    Methodology:  PD-L1 protein expression is determined by using Tumor Proportion Score (TPS), which is the percentage of viable tumor cells showing partial or complete membrane staining at any intensity. In the clinical setting of first-line therapy                           (treatment-naïve patients), the specimen is considered PD-L1 positive if the TPS is equal to or greater than 50 percent. In the setting of second-line therapy, the specimen is considered positive if the TPS is equal to or greater than 1 percent.    Reference Range:  High Expression >=50% TPS  Low Expression 1-49% TPS  No Expression <1% TPS    This assay is validated and FDA-approved for lung cancer specimens only. For all other specimen types, results should be interpreted with caution and within the appropriate clinical context. The use of this assay on decalcified tissues has not been validated  and is not recommended.   One or more of the reagents used to perform assays on this specimen MAY have contained components considered to be Laboratory Developed Tests (LDT).  LDT's have not been cleared or approved by the U.S. Food and Drug Administration.  These assays/tests were developed and their performance characteristics determined by the Department of Pathology Immunohistochemistry                           Lab at [facility]. The FDA does not require this test to go through premarket FDA review. This test is used for clinical purposes. It should not be regarded as investigational or for research. This laboratory is certified under the Clinical Laboratory Improvement Amendments (CLIA) as qualified to perform high complexity clinical laboratory testing.  The assays/tests were performed with appropriate positive and negative controls which stained appropriately.       Case Summary Report 09/26/2024                      Value:LUNG                            LUNG - All Specimens                            8th Edition - Protocol posted: 9/21/2022                                                        SPECIMEN                               Procedure:    Wedge resection                                Specimen Laterality:    Right                                                         TUMOR                               Tumor Focality:    Single focus                                Tumor Site:    Upper lobe of lung                                Tumor Size:                                     Total Tumor Size (size of entire tumor):    Greatest Dimension (Centimeters): 1.1 cm                               Histologic Type:    Invasive squamous cell carcinoma, keratinizing                                Histologic Grade:    G2, moderately differentiated                                Spread Through Air Spaces (DINA):    Present                                Visceral Pleura  Invasion:    Not identified                                Direct Invasion of Adjacent Structures:    Not applicable (no adjacent structures present)                                Treatment Effect:    No known presurgical therapy                                Lymphovascular Invasion:    Not identified                                                         MARGINS                               Margin Status for Invasive Carcinoma:    All margins negative for invasive carcinoma                                  Closest Margin(s) to Invasive Carcinoma:    Parenchymal                                  Distance from Invasive Carcinoma to Closest Margin:    At least: 1.2 cm                               Margin Status for Non-Invasive Tumor:    Not applicable                                                         REGIONAL LYMPH NODES                               Lymph Node(s) from Prior Procedures:    No known prior lymph node sampling performed                                Regional Lymph Node Status:                                     :    All regional lymph nodes negative for tumor                                  Number of Lymph Nodes Examined:    At least: 6                                    Nico Site(s) Examined:    4R: Lower paratracheal                                    Nico Site(s) Examined:    8R: Para-esophageal (below suman)                                    Nico Site(s) Examined:    9R: Pulmonary ligament                                    Nico Site(s) Examined:    10R: Hilar                                    Nico Site(s) Examined:    13R: Segmental                                    Nico Site(s) Examined:    7: Subcarinal                                                         PATHOLOGIC STAGE CLASSIFICATION (pTNM, AJCC 8th Edition)                               Reporting of pT, pN, and (when applicable) pM categories is based on information available to the pathologist at the time the report  "is issued. As per the AJCC (Chapter 1, 8th Ed.) it is the managing physician’s responsibility to establish the final pathologic stage based upon all pertinent information, including but potentially not limited to this pathology report.                               pT Category:    pT1b                                pN Category:    pN0                                                         ADDITIONAL FINDINGS                               Additional Findings:    Emphysema       Block for Additional Bio* 09/26/2024                      Value:Normal Block: A8    Tumor Block:  A4  Preliminary Assessment of Specimen Adequacy for Ancillary Testing:  Adequate   Viable tumor nuclei: 30%      Clinical History 09/26/2024                      Value:Pre-op diagnosis:  Lung nodule [R91.1]      Gross Description 09/26/2024                      Value:A. Received fresh for intraoperative consultation, labeled with the patient´s name and hospital number and \"right upper lobe wedge\", is a lung wedge that measures 8.5 X 2.6 X 1.4 cm, and weighs 7.76 g. The stapled parenchymal margin measures 9.8 cm in length.  The staple line is shaved and the parenchymal margin is inked blue. The pleura is focally thickened and puckered overlying a palpable mass. The pleural surface overlying the mass is inked yellow.  The remainder of the pleura is red-gray, mottled lightly with black streaks, smooth, and glistening. The specimen is serially sectioned to reveal an ill-defined 1.1 x 1.0 x 0.7 cm, well demarcated, gray-white, firm parenchymal nodule. The nodule is 0.1 cm from the parenchymal margin and 0.3 cm from the pleura. The remainder of the lung parenchyma is red-pink and unremarkable.  A segment of the nodule to closest parenchymal margin has been submitted in one cassette for frozen analysis. Photographs have been taken. The remainder of                           specimen is submitted entirely in 9 cassettes, for a total of 10 cassettes. The " "staple line is retained.  AUDREY/RXH    Summary of Cassettes:  Specimen Label Site    1 FSC, Representative of the nodule, Slice 4     2 Slice 1    3 Slice 2     4 Slice 3    5-6 Slice 5    7 Slice 6    8-9 Slice 7    10  Slice 8  B. Received in formalin, labeled with the patient's name and hospital number and \"level 8\", is a possible lymph node measuring 0.5 x 0.3 x 0.2 cm.  The specimen is submitted in toto in one cassette.  YTL  C. Received in formalin, labeled with the patient's name and hospital number and \"level 9\", is a possible lymph node measuring 0.4 x 0.3 x 0.2 cm. The specimen is submitted in toto in one cassette.  YTL  D. Received in formalin, labeled with the patient's name and hospital number and \"level 10\", are multiple possible lymph nodes aggregating to 0.6 x 0.6 x 0.2 cm. The specimen is submitted in toto in one cassette.  YTL  E. Received in formalin, labeled with the patient's name and                           hospital number and \"level 7\", are multiple possible lymph nodes aggregating to 1.8 x 1.8 x 0.5 cm. The specimen is serially sectioned and entirely submitted in one cassette.  YTL  F. Received in formalin, labeled with the patient's name and hospital number and \"level 4R\", is a possible lymph node measuring 2.0 x 1.2 x 0.4 cm.  The specimen is serially sectioned and entirely submitted in one cassette.  YTL  G. Received in formalin, labeled with the patient´s name and hospital number and \"right upper lobe wedge #2\", is a lung wedge that measures 8.5 x 1.5 x 1.2 cm, and weighs 5.78 g. The stapled parenchymal margins aggregate to 17 cm in length.  The staple line is shaved and the parenchymal margin is inked blue.  The pleura is red-gray, mottled lightly with black streaks, smooth, and glistening. The specimen is serially sectioned and no distinct nodule is identified. The serial sections reveals a red-pink and unremarkable cut surface Photographs have been taken. The specimen is entirely       "                     submitted in 12 cassettes. The staple line is retained.  YTL    Summary of Cassettes:  Specimen Label Site  G  1 Slice  1    2 Slice  2    3 Slice  3    4 Slice  4    5 Slice  5    6 Slice  6    7 Slice  7    8 Slice  8    9 Slice  9    10 Slice  10    11 Slice  11    12 Slice  12 & 13      Intraoperative Consultat* 09/26/2024                      Value:A: Received Date and Time: 09/26/24 8:05 AM        Called Date and Time: 09/26/24 8:40 AM     Intraoperative Diagnosis:  Right upper lobe, lung wedge:  --Positive for non-small cell carcinoma  --Carcinoma at the parenchymal margin    Staff consult Dr. Merchant    Intraoperative Consult Pathologist(s):  Milla Youngblood MD       Disclaimer 09/26/2024                      Value:One or more of the reagents used to perform assays on this specimen MAY have contained components considered to be analyte specific reagents (ASR's).  ASR's have not been cleared or approved by the U.S. Food and Drug Administration.  These assays were developed and their performance characteristics determined by the Department of Pathology at [facility]. The FDA does not require this test to go through premarket FDA review. This test is used for clinical purposes. It should not be regarded as investigational or for research. This laboratory is certified under the Clinical Laboratory Improvement Amendments (CLIA) as qualified to perform high complexity clinical laboratory testing.  The assays were performed with appropriate positive and negative controls which stained appropriately.      POCT Glucose 09/26/2024 216 (H)     POCT Glucose 09/26/2024 203 (H)     POCT Glucose 09/26/2024 199 (H)     POCT Glucose 09/26/2024 124 (H)     POCT Glucose 09/27/2024 161 (H)     POCT Glucose 09/27/2024 160 (H)     Focused Solid Tumor DNA/* 09/26/2024                      Value:Specimen: FFPE, [id number] A4  Estimated Tumor Content: 25%    MICROSATELLITE STATUS:  Microsatellite Instability-High (MSI-H) is NOT DETECTED.      DISEASE ASSOCIATED GENOMIC FINDINGS:   CDKN2A p.E88Rfs*58 ([id number] c.[id number])  KIT amplification  PDGFRA amplification  TP53 p.P278S ([id number] c.832C>T)    DISEASE RELEVANT ALTERATIONS NOT DETECTED:   Negative for ALK fusion.  Negative for BRAF V600E.  Negative for EGFR sensitizing mutation.  Negative for ERBB2 activating mutation  Negative for KRAS G12C.  Negative for MET exon 14 skipping mutation.  Negative for NTRK fusion.  Negative for RET fusion.  Negative for ROS1 fusion.        Amplicons with coverage <300x (Gene:Exon): (AKT1:12), (APC:14), (APC:16), (APC:7), (ARAF:7), (B2M:1), (CDK4:1), (CDKN2A:2), (CREBBP:6), (:26), (ERBB2:25), (ESR1:5), (ESR1:7), (FBXW7:1), (FBXW7:2), (FBXW7:8), (FGFR1:16), (FGFR1:3), (FGFR3:14), (FGFR3:16), (FGFR3:18), (FGFR3:3), (FGFR3:7), (FGFR3:9), (GNA11:5), (HRAS:3), (HRAS:4), (KEAP1:4), (MTOR:16),                           (MYCN:1), (NFE2L2:4), (POLE:39), (RET:11), (SMAD4:10), (SMAD4:4), (SMO:4), (STK11:3), (STK11:5), (STK11:6), (STK11:7), (TP53:4), (TP53:6), (TP53:7), (TP53:8), (TP53:9).  A false negative result cannot be excluded in these regions, especially in samples of low neoplastic cell content.      Archival material from this surgical specimen has been reviewed by the pathologist in order to determine the most appropriate tumor tissue for further molecular testing. The identification and selection of this tumor tissue is critical to the success of subsequent molecular testing and analysis. After review, the pathologist has determined that this specimen requires microdissection to extract the targeted tumor area for optimal molecular testing and analysis. This targeted area has been recorded by the pathologist.    DISCLAIMER:   This assay is designed to detect targeted clinically-relevant single nucleotide variants, insertion and deletions (<30bp), whole gene high copy number amplifications, and                            translocations in a select group of genes. This assay does not distinguish between somatic and germline alterations in analyzed regions. A negative result (mutation not identified) does not rule out the presence of a mutation below the limit of detection of this assay due to low neoplastic cell content, tumor heterogeneity, or the presence of additional mutations in the listed genes which are outside of the target regions in this assay. General population polymorphisms, promoter, synonymous and intronic variants (with the exception of splice variants) are not generally included in this report. The MSI-H/YA designation is based on analysis of up to 10 mononucleotide repeat loci and not based on the 5 or 7 MSI described in current clinical practice guidelines. The threshold for MSI-H/YA was determined by analytical concordance to dMMR IHC assays using mainly colorectal and uterine FFPE tissue. The clinical validity of the qualitative MSI designation has not been                           established.    Identification or absence of cancer-associated mutations does not necessarily indicate a response to therapy.  Decisions on patient care and treatment must be based on the independent medical judgment of the treating physician, taking into account all applicable information concerning the patient's condition such as clinical and histopathologic findings, other laboratory findings, and patient preferences. This report includes information from public sources, including scientific and medical literature to better characterize the significance of alterations detected.    This laboratory developed test was developed and its analytical performance characteristics have been determined by [facility]. This test has not been cleared or approved by the FDA; however, the FDA has determined that such approval is not necessary. The [facility] is certified under the Clinical Laboratory Improvement  Amendments of 1988 (CLIA-88) as qualified to perform high complexity                           testing.    PANEL GENE LIST:  Hotspot Mutations: AKT1, ALK, APC, ARAF, B2M, BRAF, CCND1, CDK4, CDKN2A, CREBBP, CTNNB1, EGFR, , ERBB2, ERBB3, ERBB4, ESR1, FBXW7, FGFR2, FGFR3, GNA11, GNAQ, H3F3A, NQJB3F9I, HRAS, IDH1, IDH2, JAK1, JAK2, JAK3, KEAP1, KIT, KRAS, MAP2K1, MAP2K2, MET, MTOR, NFE2L2, NRAS, PDGFRA, PIK3CA, POLE, PTEN, RET, ROS1, SMAD4, STK11, SMO, TP53, U2AF1.  Copy Number Variants: ALK, AR, BRAF, CCND1, CDK4, CDK6, EGFR, ERBB2, FGFR1, FGFR2, FGFR3, FGFR4, KIT, KRAS, MET, MYC, MYCN, PDGFRA, PIK3CA.  Fusion Drivers: ABL1, ALK, AKT3, GERRI, BRAF, EGFR, ERBB2, ERG, ETV1, ETV4, ETV5, FGFR1, FGFR2, FGFR3, MET, NTRK1, NTRK2, NTRK3, PDGFRA, PPARG, RAF1, RET, ROS1.    MSI Status Markers: mononucleotide repeat loci.  Not all exons of all genes are sequenced. Genome assembly (hg19) was used for alignment and variant calling.          Electronically signed an* 09/26/2024                      Value:Matty Contreras MD PhD     Pre-Admission Testing on 09/17/2024   Component Date Value    Staph/MRSA Screen Culture 09/17/2024 No Staphylococcus aureus isolated     WBC 09/17/2024 6.3     nRBC 09/17/2024 0.0     RBC 09/17/2024 3.53 (L)     Hemoglobin 09/17/2024 11.3 (L)     Hematocrit 09/17/2024 34.6 (L)     MCV 09/17/2024 98     MCH 09/17/2024 32.0     MCHC 09/17/2024 32.7     RDW 09/17/2024 14.3     Platelets 09/17/2024 204     Glucose 09/17/2024 142 (H)     Sodium 09/17/2024 141     Potassium 09/17/2024 5.0     Chloride 09/17/2024 104     Bicarbonate 09/17/2024 29     Anion Gap 09/17/2024 13     Urea Nitrogen 09/17/2024 20     Creatinine 09/17/2024 1.26     eGFR 09/17/2024 62     Calcium 09/17/2024 10.4     ABO TYPE 09/17/2024 A     Rh TYPE 09/17/2024 POS     ANTIBODY SCREEN 09/17/2024 NEG         Assessment/Plan   1.  Type 2 diabetes.  The patient has been on oral therapy including metformin 1000 mg twice daily glimepiride 4 mg  every morning.  Patient is also on losartan 25 mg daily despite blood pressure in the lower range of normal due to proteinuria.  The patient's UACR was 52 when checked on 6/13/2023.  The patient's Amaryl will be discontinued in favor of Jardiance 10 mg daily.  The patient was taken to the [facility] emergency room 10/22/2024 due to low blood glucose readings.  Patient does remain on Jardiance and glimepiride has been discontinued.  Patient is also on metformin 1000 mg twice daily.  The patient does have a Dexcom device which she is worn twice.  He is scheduled to see endocrinology.  Lab work 12/11/2024 includes a CBC hematocrit 38.1 SMA panel glucose 158 calcium 10.9 serum iron 108.  Vitamin B12 level greater than 2000.  2.  Hyperlipidemia.  This patient had been on atorvastatin 20 mg daily for many years but the dose was increased to 40 mg daily several years ago.  He did have a lipid panel on 3/20/2024 with cholesterol 125 LDL 69 HDL 39 triglyceride 80.  He will continue present management.  3.  Chronic smoking 1 pack/day.  This patient did have a chest CT on 4/18/2024 showing multiple additional pulmonary nodules along with severe coronary artery calcification.  4.  Coronary artery disease.  This patient has CT coronary calcium score of 265 when performed on 11/27/2023.  EKG performed today 6/11/2024 shows sinus rhythm and is unremarkable.  Patient will be scheduled for a pharmacological nuclear stress test.  The patient did have a pharmacological nuclear stress test done on 7/8/2024 which showed normal myocardial perfusion LV ejection fraction of greater than 65%.  Patient denies any chest discomfort.  5.  History of cerebral palsy and MRDD.  6.  COPD/squamous cell lung carcinoma.  This patient was admitted to [facility] 9/26/2024 - 9/27/2024 for robotic assisted right upper lobe wedge resection which was positive for an invasive squamous cell carcinoma lymph nodes being negative.  Repeat chest  x-ray 10/22/2024 showed clear lung fields status post right upper lobe postsurgical change.  7.  Status post bilateral cataract extractions.  8.  Diminished hearing.        Orders:  No orders of the defined types were placed in this encounter.     Followup Appts:  Future Appointments   Date Time Provider Department Center   12/17/2024  9:30 AM Meseret Hope PA-C [id number] [facility]   1/14/2025  9:00 AM MD EFRAIN [id number]   2/4/2025 11:00 AM Noah Handy, PharmD [id number] [facility]   4/15/2025  9:00 AM Lupillo Weinstein MD [id number]   4/15/2025  9:30 AM [id number]   4/15/2025 10:00 AM Theodore Perez [id number] [place]   4/15/2025 11:15 AM EDY CT 1 WESCT [facility]   4/22/2025  9:15 AM Cyrus Johnson MD [id number] [facility]   4/29/2025 10:30 AM Shreya Mcnamara, APRN-CNP [id number] [facility]   6/3/2025  8:15 AM Panfilo Ingram MD [id number] [facility]           ____________________________________________________________  Man Sandoval MD  [facility]  Assistant Clinical Professor, [employer]  [facility]

## 2024-12-12 VITALS
BODY MASS INDEX: 23.55 KG/M2 | OXYGEN SATURATION: 97 % | HEIGHT: 66 IN | SYSTOLIC BLOOD PRESSURE: 90 MMHG | DIASTOLIC BLOOD PRESSURE: 56 MMHG | WEIGHT: 146.56 LBS | HEART RATE: 84 BPM

## 2024-12-17 ENCOUNTER — OFFICE VISIT (OUTPATIENT)
Dept: HEMATOLOGY/ONCOLOGY | Facility: CLINIC | Age: 69
End: 2024-12-17
Payer: MEDICARE

## 2024-12-17 VITALS
HEART RATE: 97 BPM | BODY MASS INDEX: 23.7 KG/M2 | RESPIRATION RATE: 18 BRPM | OXYGEN SATURATION: 99 % | SYSTOLIC BLOOD PRESSURE: 110 MMHG | DIASTOLIC BLOOD PRESSURE: 70 MMHG | WEIGHT: 146.83 LBS | TEMPERATURE: 96.1 F

## 2024-12-17 DIAGNOSIS — D63.8 ANEMIA OF CHRONIC DISEASE: Primary | ICD-10-CM

## 2024-12-17 PROCEDURE — 1126F AMNT PAIN NOTED NONE PRSNT: CPT | Performed by: NURSE PRACTITIONER

## 2024-12-17 PROCEDURE — 1123F ACP DISCUSS/DSCN MKR DOCD: CPT | Performed by: NURSE PRACTITIONER

## 2024-12-17 PROCEDURE — 1157F ADVNC CARE PLAN IN RCRD: CPT | Performed by: NURSE PRACTITIONER

## 2024-12-17 PROCEDURE — 99215 OFFICE O/P EST HI 40 MIN: CPT | Performed by: NURSE PRACTITIONER

## 2024-12-17 PROCEDURE — 4010F ACE/ARB THERAPY RXD/TAKEN: CPT | Performed by: NURSE PRACTITIONER

## 2024-12-17 PROCEDURE — 3048F LDL-C <100 MG/DL: CPT | Performed by: NURSE PRACTITIONER

## 2024-12-17 PROCEDURE — 3078F DIAST BP <80 MM HG: CPT | Performed by: NURSE PRACTITIONER

## 2024-12-17 PROCEDURE — 3074F SYST BP LT 130 MM HG: CPT | Performed by: NURSE PRACTITIONER

## 2024-12-17 PROCEDURE — 1159F MED LIST DOCD IN RCRD: CPT | Performed by: NURSE PRACTITIONER

## 2024-12-17 ASSESSMENT — ENCOUNTER SYMPTOMS
MUSCULOSKELETAL NEGATIVE: 1
CARDIOVASCULAR NEGATIVE: 1
LEG SWELLING: 0
FATIGUE: 0
DIARRHEA: 0
CONSTIPATION: 0
EXTREMITY WEAKNESS: 0
BACK PAIN: 0
GASTROINTESTINAL NEGATIVE: 1
FLANK PAIN: 0
EYE PROBLEMS: 1
ARTHRALGIAS: 0
NEUROLOGICAL NEGATIVE: 1
ABDOMINAL PAIN: 0
ADENOPATHY: 0
COUGH: 0
LIGHT-HEADEDNESS: 0
SHORTNESS OF BREATH: 0
CHILLS: 0
HEMATOLOGIC/LYMPHATIC NEGATIVE: 1
BLOOD IN STOOL: 0
DIAPHORESIS: 0
FEVER: 0
BRUISES/BLEEDS EASILY: 0
PALPITATIONS: 0
RESPIRATORY NEGATIVE: 1
UNEXPECTED WEIGHT CHANGE: 1
NAUSEA: 0
HEADACHES: 0
TROUBLE SWALLOWING: 1
SORE THROAT: 1
HEMOPTYSIS: 0

## 2024-12-17 ASSESSMENT — PAIN SCALES - GENERAL: PAINLEVEL_OUTOF10: 0-NO PAIN

## 2024-12-17 NOTE — PROGRESS NOTES
Patient here for 6 month follow up visit Anemia.      No concerns or complaints noted at this time.   Patient here with sister Jesica  Labs completed 12/11/24  Patient is a heavy smoker  Taking jardiance and having intended weight loss.   Cancer side of nose removed in July   Wedge surgery upper right lung  Right side and lymph nodes Sept 2024. Cancer finding.     Medications and Allergies reviewed and reconciled this visit.    Follow up per MD request.    Patient reports availability and use of mychart, Reviewed this is a good place to communicate with the team as well as review labs and upcoming orders.      No barriers to education noted, patient agrees to current plan and verbalized understanding using teach back method.

## 2024-12-17 NOTE — PROGRESS NOTES
"Patient ID: Benjie Waters is a 69 y.o. male.  Referring Physician: Meseret Hope PA-C  [id number]  [facility] [address]  [place] [address]  Primary Care Provider: Meseret De La Torre PA-C  Visit Type: Follow Up      Subjective    HPI  Mr Waters is mentally disabled 69yo who presents with his sister, Jesica Justice, for evaluation of anemia. Labs done 10/4/23 showed WBC 5.3, hgb 11.6 nand plt 222. There was noted macrocytosis with  with normal B12 549 and monocytosis 11.7%. CMP was normal with exception of hypercalcemia of 11.   He is heavy smoker and CT scan  4/18/24 showed spiculated RUL lesion 0.7cm with multiple smaller nodules, emphysema and CAD. No reported cough, congestion or shortness of breath. He had colonoscopy 2021 reportedly normal. Iron levels were good in 2021 and HCV negative 8/7/21.   He is not taking oral iron.  Sister was told to stop his B12 when it got to 500, but currently has resumed it.  He was found to be EBV+.  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  He has repeat CT scheduled in March and will follow with Dr Han for this.  .  He has been noted to have 10 pound weight loss over the past month but this is in postoperative lung wedge resection and taking jardiance and metformin at the same time.  His hgb is 12 range and monocytosis is resolved.  Current iron levels are good, but B12 is low   He states he has difficulty swallowing     Review of Systems   Constitutional:  Positive for unexpected weight change. Negative for chills, diaphoresis, fatigue and fever.   HENT:   Positive for hearing loss, sore throat and trouble swallowing. Negative for lump/mass, mouth sores and nosebleeds.    Eyes:  Positive for eye problems.        Follows with ophthalmology, sister states he has \"calcium deposits on his eyes\"   Respiratory: Negative.  Negative for " cough, hemoptysis and shortness of breath.    Cardiovascular: Negative.  Negative for chest pain, leg swelling and palpitations.   Gastrointestinal: Negative.  Negative for abdominal pain, blood in stool, constipation, diarrhea and nausea.   Musculoskeletal: Negative.  Negative for arthralgias, back pain and flank pain.   Skin: Negative.    Neurological: Negative.  Negative for extremity weakness, headaches and light-headedness.   Hematological: Negative.  Negative for adenopathy. Does not bruise/bleed easily.   Psychiatric/Behavioral:          Mentally disabled        Objective   BSA: 1.76 meters squared  /70 (BP Location: Right arm, Patient Position: Sitting, BP Cuff Size: Adult long)   Pulse 97   Temp 35.6 °C (96.1 °F) (Temporal)   Resp 18   Wt 66.6 kg (146 lb 13.2 oz)   SpO2 99%   BMI 23.70 kg/m²      has a past medical history of Anemia, Aphakia, unspecified eye (10/04/2022), Arthritis, Cataract, Cerebral palsy, COPD (chronic obstructive pulmonary disease) (Multi), Coronary artery disease, Dry eye syndrome of bilateral lacrimal glands (12/27/2017), GERD (gastroesophageal reflux disease), Glaucoma, Hearing aid worn, HL (hearing loss), Hyperlipidemia, Ischemic optic neuropathy, unspecified eye (10/04/2022), Lung nodule, Personal history of other infectious and parasitic diseases, Shortness of breath, Skin cancer of nose, Smoker, Tinea unguium, and Type 2 diabetes mellitus.   has a past surgical history that includes Other surgical history (04/06/2021); Cataract extraction (04/23/2023); and Colonoscopy (2021).  Family History   Problem Relation Name Age of Onset    No Known Problems Mother           Benjie Waters  reports that he has been smoking cigarettes. He started smoking about 52 years ago. He has a 52.7 pack-year smoking history. He has been exposed to tobacco smoke. He has never used smokeless tobacco.  He  reports no history of alcohol use.  He  reports no history of drug use.    Physical  Exam  Constitutional:       Appearance: He is ill-appearing and toxic-appearing.      Comments: Mentally disabled, eyes with white film.  Weight loss is apparent   HENT:      Ears:      Comments: Bilateral hearing aides  Neck:      Comments: Thickening left neck, no palpable masses or lymphadenopathy, may be strain of sternocleidomastoid, no pain to palpation or movement, but admits to difficulty swallowing  Cardiovascular:      Rate and Rhythm: Normal rate and regular rhythm.      Pulses: Normal pulses.      Heart sounds: Normal heart sounds.   Pulmonary:      Effort: Pulmonary effort is normal.      Breath sounds: Rhonchi and rales present.   Abdominal:      Palpations: Abdomen is soft.      Tenderness: There is no abdominal tenderness.   Musculoskeletal:         General: No swelling or deformity. Normal range of motion.   Lymphadenopathy:      Cervical: No cervical adenopathy.   Skin:     Coloration: Skin is pale. Skin is not jaundiced.      Findings: No bruising.   Neurological:      Motor: No weakness.     WBC   Date/Time Value Ref Range Status   12/11/2024 11:03 AM 5.6 4.4 - 11.3 x10*3/uL Final   10/22/2024 03:02 PM 6.9 4.4 - 11.3 x10*3/uL Final   09/17/2024 11:38 AM 6.3 4.4 - 11.3 x10*3/uL Final     nRBC   Date Value Ref Range Status   12/11/2024 0.0 0.0 - 0.0 /100 WBCs Final   10/22/2024 0.0 0.0 - 0.0 /100 WBCs Final   09/17/2024 0.0 0.0 - 0.0 /100 WBCs Final     RBC   Date Value Ref Range Status   12/11/2024 3.81 (L) 4.50 - 5.90 x10*6/uL Final   10/22/2024 3.44 (L) 4.50 - 5.90 x10*6/uL Final   09/17/2024 3.53 (L) 4.50 - 5.90 x10*6/uL Final     Hemoglobin   Date Value Ref Range Status   12/11/2024 12.4 (L) 13.5 - 17.5 g/dL Final   10/22/2024 11.2 (L) 13.5 - 17.5 g/dL Final   09/17/2024 11.3 (L) 13.5 - 17.5 g/dL Final     Hematocrit   Date Value Ref Range Status   12/11/2024 38.1 (L) 41.0 - 52.0 % Final   10/22/2024 34.7 (L) 41.0 - 52.0 % Final   09/17/2024 34.6 (L) 41.0 - 52.0 % Final     MCV   Date/Time  Value Ref Range Status   12/11/2024 11:03  80 - 100 fL Final   10/22/2024 03:02  (H) 80 - 100 fL Final   09/17/2024 11:38 AM 98 80 - 100 fL Final     MCH   Date/Time Value Ref Range Status   12/11/2024 11:03 AM 32.5 26.0 - 34.0 pg Final   10/22/2024 03:02 PM 32.6 26.0 - 34.0 pg Final   09/17/2024 11:38 AM 32.0 26.0 - 34.0 pg Final     MCHC   Date/Time Value Ref Range Status   12/11/2024 11:03 AM 32.5 32.0 - 36.0 g/dL Final   10/22/2024 03:02 PM 32.3 32.0 - 36.0 g/dL Final   09/17/2024 11:38 AM 32.7 32.0 - 36.0 g/dL Final     RDW   Date/Time Value Ref Range Status   12/11/2024 11:03 AM 13.7 11.5 - 14.5 % Final   10/22/2024 03:02 PM 14.5 11.5 - 14.5 % Final   09/17/2024 11:38 AM 14.3 11.5 - 14.5 % Final     Platelets   Date/Time Value Ref Range Status   12/11/2024 11:03  150 - 450 x10*3/uL Final   10/22/2024 03:02  150 - 450 x10*3/uL Final   09/17/2024 11:38  150 - 450 x10*3/uL Final     MPV   Date/Time Value Ref Range Status   10/04/2023 09:19 AM 11.4 7.5 - 11.5 fL Final   08/07/2021 09:29 AM 10.8 7.0 - 12.6 CU Final   06/03/2020 09:55 AM 11.0 7.0 - 12.6 CU Final     Neutrophils %   Date/Time Value Ref Range Status   12/11/2024 11:03 AM 57.0 40.0 - 80.0 % Final   10/22/2024 03:02 PM 62.2 40.0 - 80.0 % Final   06/11/2024 10:19 AM 60.2 40.0 - 80.0 % Final     Immature Granulocytes %, Automated   Date/Time Value Ref Range Status   12/11/2024 11:03 AM 0.5 0.0 - 0.9 % Final     Comment:     Immature Granulocyte Count (IG) includes promyelocytes, myelocytes and metamyelocytes but does not include bands. Percent differential counts (%) should be interpreted in the context of the absolute cell counts (cells/UL).   10/22/2024 03:02 PM 0.3 0.0 - 0.9 % Final     Comment:     Immature Granulocyte Count (IG) includes promyelocytes, myelocytes and metamyelocytes but does not include bands. Percent differential counts (%) should be interpreted in the context of the absolute cell counts (cells/UL).    06/11/2024 10:19 AM 0.3 0.0 - 0.9 % Final     Comment:     Immature Granulocyte Count (IG) includes promyelocytes, myelocytes and metamyelocytes but does not include bands. Percent differential counts (%) should be interpreted in the context of the absolute cell counts (cells/UL).     Lymphocytes %   Date/Time Value Ref Range Status   12/11/2024 11:03 AM 27.0 13.0 - 44.0 % Final   10/22/2024 03:02 PM 22.5 13.0 - 44.0 % Final   06/11/2024 10:19 AM 24.7 13.0 - 44.0 % Final     Monocytes %   Date/Time Value Ref Range Status   12/11/2024 11:03 AM 12.6 2.0 - 10.0 % Final   10/22/2024 03:02 PM 12.0 2.0 - 10.0 % Final   06/11/2024 10:19 AM 12.0 2.0 - 10.0 % Final     Eosinophils %   Date/Time Value Ref Range Status   12/11/2024 11:03 AM 2.5 0.0 - 6.0 % Final   10/22/2024 03:02 PM 2.6 0.0 - 6.0 % Final   06/11/2024 10:19 AM 2.5 0.0 - 6.0 % Final     Basophils %   Date/Time Value Ref Range Status   12/11/2024 11:03 AM 0.4 0.0 - 2.0 % Final   10/22/2024 03:02 PM 0.4 0.0 - 2.0 % Final   06/11/2024 10:19 AM 0.3 0.0 - 2.0 % Final     Neutrophils Absolute   Date/Time Value Ref Range Status   12/11/2024 11:03 AM 3.22 1.20 - 7.70 x10*3/uL Final     Comment:     Percent differential counts (%) should be interpreted in the context of the absolute cell counts (cells/uL).   10/22/2024 03:02 PM 4.30 1.20 - 7.70 x10*3/uL Final     Comment:     Percent differential counts (%) should be interpreted in the context of the absolute cell counts (cells/uL).   06/11/2024 10:19 AM 3.54 1.20 - 7.70 x10*3/uL Final     Comment:     Percent differential counts (%) should be interpreted in the context of the absolute cell counts (cells/uL).     Immature Granulocytes Absolute, Automated   Date/Time Value Ref Range Status   12/11/2024 11:03 AM 0.03 0.00 - 0.70 x10*3/uL Final   10/22/2024 03:02 PM 0.02 0.00 - 0.70 x10*3/uL Final   06/11/2024 10:19 AM 0.02 0.00 - 0.70 x10*3/uL Final     Lymphocytes Absolute   Date/Time Value Ref Range Status    12/11/2024 11:03 AM 1.52 1.20 - 4.80 x10*3/uL Final   10/22/2024 03:02 PM 1.56 1.20 - 4.80 x10*3/uL Final   06/11/2024 10:19 AM 1.46 1.20 - 4.80 x10*3/uL Final     Monocytes Absolute   Date/Time Value Ref Range Status   12/11/2024 11:03 AM 0.71 0.10 - 1.00 x10*3/uL Final   10/22/2024 03:02 PM 0.83 0.10 - 1.00 x10*3/uL Final   06/11/2024 10:19 AM 0.71 0.10 - 1.00 x10*3/uL Final     Eosinophils Absolute   Date/Time Value Ref Range Status   12/11/2024 11:03 AM 0.14 0.00 - 0.70 x10*3/uL Final   10/22/2024 03:02 PM 0.18 0.00 - 0.70 x10*3/uL Final   06/11/2024 10:19 AM 0.15 0.00 - 0.70 x10*3/uL Final     Basophils Absolute   Date/Time Value Ref Range Status   12/11/2024 11:03 AM 0.02 0.00 - 0.10 x10*3/uL Final   10/22/2024 03:02 PM 0.03 0.00 - 0.10 x10*3/uL Final   06/11/2024 10:19 AM 0.02 0.00 - 0.10 x10*3/uL Final         Assessment/Plan    Anemia with macrocytosis and monocytosis in asymptomatic mentally disabled, blind man with normal B12 in Oct 2023 and normal iron and negative HCV in 2021.  Colonoscopy reportedly normal in 2021, currently without GI symptoms of diarrhea alternating with constipation.  He was noted to have hypercalcemia of 11 10/4/23 and now 10.6.  He denies specific bone or joint pain.  Hgb now 12 without monocytosis.  May be slightly low due to low B12 or recent wedge resection.      His ironlevbels are good .  B12 is lower at 260 and recommend resuming oral B12.   Liquid, 1 dropper SL every day.      Heavy tobacco abuse with abnormal CT,  with wedge resection 9/26/24 found to be invasive squamous cell carcinoma 1.1cm with 0/5 LN.   CT to be done in March and followed by Dr Han.         Diagnoses and all orders for this visit:  Anemia of chronic disease  -     Ferritin; Future  -     Iron and TIBC; Future  -     Vitamin B12; Future  -     Flow Cytometry Test; Future  -     Clinic Appointment Request Follow up  -     CBC and Auto Differential; Future  -     Comprehensive Metabolic Panel;  Future  -     Ferritin; Future  -     Iron and TIBC; Future  -     Vitamin B12; Future  -     Clinic Appointment Request Follow up; Future           Meseret Hope PA-C

## 2025-01-14 ENCOUNTER — APPOINTMENT (OUTPATIENT)
Dept: OTOLARYNGOLOGY | Facility: CLINIC | Age: 70
End: 2025-01-14
Payer: MEDICAID

## 2025-01-14 VITALS — BODY MASS INDEX: 23.46 KG/M2 | WEIGHT: 146 LBS | HEIGHT: 66 IN

## 2025-01-14 DIAGNOSIS — H69.93 DYSFUNCTION OF BOTH EUSTACHIAN TUBES: Primary | ICD-10-CM

## 2025-01-14 DIAGNOSIS — F79 INTELLECTUAL DISABILITY: ICD-10-CM

## 2025-01-14 DIAGNOSIS — H61.23 BILATERAL IMPACTED CERUMEN: ICD-10-CM

## 2025-01-14 DIAGNOSIS — H90.6 MIXED CONDUCTIVE AND SENSORINEURAL HEARING LOSS OF BOTH EARS: ICD-10-CM

## 2025-01-14 PROCEDURE — 4010F ACE/ARB THERAPY RXD/TAKEN: CPT | Performed by: OTOLARYNGOLOGY

## 2025-01-14 PROCEDURE — 99213 OFFICE O/P EST LOW 20 MIN: CPT | Performed by: OTOLARYNGOLOGY

## 2025-01-14 PROCEDURE — 1123F ACP DISCUSS/DSCN MKR DOCD: CPT | Performed by: OTOLARYNGOLOGY

## 2025-01-14 PROCEDURE — 1159F MED LIST DOCD IN RCRD: CPT | Performed by: OTOLARYNGOLOGY

## 2025-01-14 PROCEDURE — 1157F ADVNC CARE PLAN IN RCRD: CPT | Performed by: OTOLARYNGOLOGY

## 2025-01-14 PROCEDURE — 3008F BODY MASS INDEX DOCD: CPT | Performed by: OTOLARYNGOLOGY

## 2025-01-14 NOTE — PROGRESS NOTES
Chief Complaint   Patient presents with    Cerumen Impaction     LOV: 10/2024 LEFT TUBE, RIGHT TYM/PERF       HPI  He is here for every 3 month ear cleaning.  Occasional left otorrhea.  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  Benjie Waters is a 69 y.o. male with recurrent cerumen impactions. He is s/p left tube placed March 23, 2022 and a right perforation. 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   History:  with severe MR. He is status post left myringotomy that has healed and right tympanic membrane perforation. He has a chronic eustachian tube dysfunction bilaterally. He smokes. He is using Allegra for his allergies      Past Medical History:   Diagnosis Date    Anemia     Aphakia, unspecified eye 10/04/2022    Aphakia    Arthritis     Cataract     Cerebral palsy     COPD (chronic obstructive pulmonary disease) (Multi)     Coronary artery disease     Dry eye syndrome of bilateral lacrimal glands 12/27/2017    Dry eye syndrome, bilateral    GERD (gastroesophageal reflux disease)     Glaucoma     Hearing aid worn     HL (hearing loss)     Hyperlipidemia     Ischemic optic neuropathy, unspecified eye 10/04/2022    AION (anterior ischemic optic neuropathy)    Lung nodule     right    Personal history of other infectious and parasitic diseases     History of athlete's foot    Shortness of breath     Skin cancer of nose     Smoker     Tinea unguium     Mycotic toenails    Type 2 diabetes mellitus             Medications:     Current Outpatient Medications:     acetaminophen (Tylenol) 325 mg tablet, Take 3 tablets (975 mg) by mouth every 8 hours., Disp: , Rfl:     artificial tears, dextran-hypomel-glycerin, 0.1-0.3-0.2 % ophthalmic solution, Administer 1 drop into affected eye(s) 4 times a day., Disp: , Rfl:     ascorbic acid (Vitamin C) 1,000 mg tablet, Take 1 tablet (1,000 mg) by  "mouth once daily., Disp: , Rfl:     aspirin 81 mg EC tablet, Take 1 tablet (81 mg) by mouth once daily., Disp: , Rfl:     atorvastatin (Lipitor) 40 mg tablet, Take 1 tablet (40 mg) by mouth once daily., Disp: 90 tablet, Rfl: 1    blood sugar diagnostic (OneTouch Verio test strips) strip, Use to check blood sugar once daily, Disp: 100 strip, Rfl: 3    brimonidine (AlphaGAN) 0.2 % ophthalmic solution, Administer 1 drop into both eyes 2 times a day., Disp: 10 mL, Rfl: 3    empagliflozin (Jardiance) 10 mg, Take 1 tablet (10 mg) by mouth once daily., Disp: 90 tablet, Rfl: 3    fexofenadine (Allegra Allergy) 180 mg tablet, Take 1 tablet (180 mg) by mouth once daily as needed. TAKE PER DIRECTED, Disp: , Rfl:     flaxseed oiL 1,000 mg capsule, Take 1 capsule (1,000 mg) by mouth once daily. TAKE PER DIECTED, Disp: , Rfl:     lancets (OneTouch Delica Plus Lancet) 33 gauge misc, Use to check blood sugar once daily, Disp: 100 each, Rfl: 3    losartan (Cozaar) 25 mg tablet, TAKE 1 TABLET BY MOUTH EVERY DAY (Patient taking differently: Take 1 tablet (25 mg) by mouth once daily. 1/2 daily), Disp: 90 tablet, Rfl: 1    metFORMIN  mg 24 hr tablet, TAKE 2 TABLETS (1000 MG) BY MOUTH TWICE A DAY, Disp: 360 tablet, Rfl: 1    montelukast (Singulair) 10 mg tablet, TAKE 1 TABLET BY MOUTH EVERY DAY, Disp: 90 tablet, Rfl: 1    oxyCODONE (Roxicodone) 5 mg immediate release tablet, Take 1 tablet (5 mg) by mouth every 6 hours if needed (pain)., Disp: 20 tablet, Rfl: 0    pantoprazole (ProtoNix) 40 mg EC tablet, TAKE 1 TABLET BY MOUTH EVERY DAY, Disp: 90 tablet, Rfl: 2     Allergies:  Allergies   Allergen Reactions    Bacitracin Unknown    Erythromycin Unknown    Latex Unknown        Physical Exam:  Last Recorded Vitals  Height 1.676 m (5' 6\"), weight 66.2 kg (146 lb).  []General appearance: Well-developed, well-nourished in no acute distress, conversant with normal voice quality    Head/face: No erythema or edema or facial tenderness, and " normal facial nerve function bilaterally    External ear: Clear external auditory canals with normal pinnae, bilateral cerumen impactions removed  Tube status: Left T-tube in place  Middle ear: Large right tympanic membrane perforation.  Left middle ear mucosa looks normal  Tympanic membrane perforation: N/A  Mastoid bowl: N/A  Hearing: Normal conversational awareness at normal speech thresholds    Nose visualized using: Anterior rhinoscopy  Nasal dorsum: Nontraumatic midline appearance  Septum: Midline, nonobstructing  Inferior turbinates: Normal, pink  Secretions: Dry    Oral cavity and oropharynx: Normal  Teeth: Good condition  Floor of mouth: without lesions  Palate: Normal hard palate, soft palate and uvula  Oropharynx: Clear, no lesions present  Buccal mucosa: Normal without masses or lesions  Lips: Normal    Nasopharynx: Inadequate mirror exam secondary to gag/anatomy    Neck:  Salivary glands: Normal bilateral parotid and submandibular glands by inspection and palpation.  Non-thyroid masses: No palpable masses or significant lymphadenopathy  Trachea: Midline  Thyroid: No thyromegaly or palpable nodules  Temporomandibular joint: Nontender  Cervical range of motion: Normal    Neurologic exam: Alert and oriented x3, appropriate affect.  Cranial nerves II-XII normal bilaterally  Extraocular movement: Extraocular movement intact, normal gaze alignment    Assessment/Plan   Encounter Diagnoses   Name Primary?    Dysfunction of both eustachian tubes Yes    Mixed conductive and sensorineural hearing loss of both ears     Intellectual disability     Bilateral impacted cerumen                  Ears cleaned.  Recheck in 3 months.  Tube in good position.  Continue with hearing aid use.  Ciprodex for drainage if and when it happens    Lupillo Weinstein MD

## 2025-01-22 DIAGNOSIS — H10.13 ALLERGIC CONJUNCTIVITIS OF BOTH EYES: Primary | ICD-10-CM

## 2025-01-22 RX ORDER — TOBRAMYCIN AND DEXAMETHASONE 3; 1 MG/G; MG/G
0.5 OINTMENT OPHTHALMIC 3 TIMES DAILY
Qty: 5 G | Refills: 3 | Status: SHIPPED | OUTPATIENT
Start: 2025-01-22

## 2025-01-22 RX ORDER — TOBRAMYCIN AND DEXAMETHASONE 3; 1 MG/G; MG/G
0.5 OINTMENT OPHTHALMIC 3 TIMES DAILY
COMMUNITY
End: 2025-01-22 | Stop reason: SDUPTHER

## 2025-01-30 DIAGNOSIS — E78.2 MIXED HYPERLIPIDEMIA: ICD-10-CM

## 2025-01-30 RX ORDER — ATORVASTATIN CALCIUM 40 MG/1
40 TABLET, FILM COATED ORAL DAILY
Qty: 90 TABLET | Refills: 0 | Status: SHIPPED | OUTPATIENT
Start: 2025-01-30

## 2025-02-04 ENCOUNTER — APPOINTMENT (OUTPATIENT)
Dept: PHARMACY | Facility: HOSPITAL | Age: 70
End: 2025-02-04
Payer: MEDICARE

## 2025-02-04 DIAGNOSIS — E11.649 TYPE 2 DIABETES MELLITUS WITH HYPOGLYCEMIA WITHOUT COMA, WITHOUT LONG-TERM CURRENT USE OF INSULIN: ICD-10-CM

## 2025-02-04 RX ORDER — BLOOD-GLUCOSE SENSOR
EACH MISCELLANEOUS
Qty: 2 EACH | Refills: 11 | Status: SHIPPED | OUTPATIENT
Start: 2025-02-04

## 2025-02-04 NOTE — PROGRESS NOTES
Patient is sent at the request of Shreya Mcnamara, APR* for my opinion regarding diabetes.  My recommendations below will be communicated back to the requesting provider by way of shared medical record.    Recommendations:   No changes today d/t caregiver preference  Focus on improving diet and reducing high CHO foods  ________________________________________________________________________    Subjective   Past Medical History:  Patient Active Problem List   Diagnosis    Allergic conjunctivitis    Bilateral impacted cerumen    Nuclear sclerotic cataract    Cerebral palsy    Corneal edema    Diabetes mellitus type 2 without retinopathy (Multi)    Tear film insufficiency    Dysfunction of both eustachian tubes    Glaucoma of both eyes    High cholesterol    Intellectual disability    Mixed conductive and sensorineural hearing loss of both ears    Myopic degeneration, left eye    Perforation of left tympanic membrane    Primary open angle glaucoma    Gastroesophageal reflux disease    Benign essential HTN    Pulmonary emphysema (Multi)    Hypercalcemia    Anemia of chronic disease    Anterior ischemic optic neuropathy    Snoring    Glaucoma    Hypertension    Intellectual functioning disability    Lipids abnormal    Low back pain    Mixed dyslipidemia    Nicotine dependence, cigarettes, uncomplicated    Obstructive sleep apnea syndrome    Onychomycosis of toenail    Pulmonary nodules    Type 2 diabetes mellitus    Mixed hyperlipidemia    Hyperopia of both eyes    Dyslipidemia    Lung nodule     Interim:  Benjie Waters is a 69 y.o. male presents today for follow up visit with endo PharmD for Type 2 Diabetes Mellitus. Last seen by myself (Noah Handy) on 11/15/24 where no changes were made.    Today the visit was conducted w/ the patient's sister/caretaker Jesica. Reports overall doing well but has noticed higher BG readings in the morning after a big dinner or high CHO snack before bed. Pt previously  expressed dislike for CGM sensors, but caregiver reports interest again to limit burden of fingersticks.      Diabetes Pharmacotherapy:    Metformin 500 mg - 2 tablets twice daily   Jardiance 10 mg once daily      Previously trialed meds:   Glimepiride - hypoglycemia     Social:  Current diet: on average, 3 meals per day + snacks  Breakfast - 1 egg + toast or rarely pancakes  Lunch - bologna sandwich  Dinner - chicken, noodles, green beans  Snack - small pack of mini muffins before bed  Fluids - 50% sugar OJ, coffee (black), water, coke zero  Nicotine: pt smoking 3/4 PPD, not interested in smoking cessation at this time      Allergies:  Bacitracin, Erythromycin, and Latex    Medication list:  Current Outpatient Medications   Medication Instructions    acetaminophen (TYLENOL) 975 mg, oral, Every 8 hours    artificial tears, dextran-hypomel-glycerin, 0.1-0.3-0.2 % ophthalmic solution 1 drop, 4 times daily    ascorbic acid (VITAMIN C) 1,000 mg, Daily    aspirin 81 mg, Daily    atorvastatin (LIPITOR) 40 mg, oral, Daily    blood sugar diagnostic (OneTouch Verio test strips) strip Use to check blood sugar once daily    brimonidine (AlphaGAN) 0.2 % ophthalmic solution 1 drop, Both Eyes, 2 times daily    empagliflozin (JARDIANCE) 10 mg, oral, Daily    fexofenadine (ALLEGRA ALLERGY) 180 mg, Daily PRN    flaxseed oiL 1,000 mg, Daily    lancets (OneTouch Delica Plus Lancet) 33 gauge misc Use to check blood sugar once daily    losartan (COZAAR) 25 mg, oral, Daily    metFORMIN XR (GLUCOPHAGE-XR) 1,000 mg, oral, 2 times daily    montelukast (SINGULAIR) 10 mg, oral, Daily    oxyCODONE (ROXICODONE) 5 mg, oral, Every 6 hours PRN    pantoprazole (PROTONIX) 40 mg, oral, Daily    tobramycin-dexamethasone (Tobradex) ophthalmic ointment 0.5 inches, Both Eyes, 3 times daily        Objective   Last Recorded Vitals:  BP Readings from Last 3 Encounters:   12/17/24 110/70   12/12/24 90/56   10/29/24 101/71     Wt Readings from Last 3  "Encounters:   01/14/25 66.2 kg (146 lb)   12/17/24 66.6 kg (146 lb 13.2 oz)   12/11/24 66.5 kg (146 lb 9 oz)     BMI Readings from Last 1 Encounters:   01/14/25 23.57 kg/m²      Labs  A1C  Lab Results   Component Value Date    HGBA1C 6.9 (A) 10/04/2024    HGBA1C 6.5 03/18/2024    HGBA1C 6.8 (H) 10/04/2023     BMP/LFTs  Lab Results   Component Value Date    CREATININE 1.11 12/11/2024    CREATININE 1.05 10/22/2024    CREATININE 1.26 09/17/2024    EGFR 72 12/11/2024    EGFR 77 10/22/2024    EGFR 62 09/17/2024    GLUCOSE 158 (H) 12/11/2024     12/11/2024    K 4.4 12/11/2024     12/11/2024    CALCIUM 10.9 (H) 12/11/2024    CO2 30 12/11/2024    BUN 20 12/11/2024    ALT 12 12/11/2024    AST 11 12/11/2024    ALKPHOS 68 12/11/2024    BILITOT 0.5 12/11/2024     Lipids  Lab Results   Component Value Date    TRIG 80 03/20/2024    CHOL 125 03/20/2024    LDLF 62 06/13/2023    LDLCALC 69 03/20/2024    HDL 39.9 03/20/2024     Urine Albumin Creatinine Ratio  Lab Results   Component Value Date    MICROALBCREA 52.6 (H) 06/13/2023    MICROALBCREA 30.2 (H) 08/10/2022    MICROALBCREA 18.7 02/11/2021     ASCVD risk  The ASCVD Risk score (Andi DK, et al., 2019) failed to calculate for the following reasons:    The valid total cholesterol range is 130 to 320 mg/dL    Additional labs:  No results found for: \"FRUCTOSAMINE\", \"CPEPTIDE\", \"MGE09XZ\", \"NTIB\", \"ZNT8A\", \"INSAB\"    Home glucose monitoring:  Hypoglycemia: denies readings <70 mg/dL or s/sx of hypoglycemia  Date FBG preD   4-Feb 192    3-Feb 204 109   2-Feb 128    1-Feb 159    31-Jan 123    30-Jan 159    29-Jan 129    28-Jan 154    27-Jan 166    26-Jan 142    25-Jan 157    24-Jan 182    23-Jan 132    22-Jan 130    21-Jan 133    20-Jan 123          109       Assessment/Plan   Type 2 Diabetes Mellitus  Goal A1C <7.5-8% (h/o lows, age, and comorbidities), at goal for the past year. SMBG data shows AM readings are sometimes well controlled and other times elevated, " appears related to diet, no lows noted. Previous CGM data was well controlled. Discussed this w/ patient's sister and that Jardiance could be increased. However, pt's sister preferred to focus on diet instead today. Given relaxed A1C goal and near goal on avg in the AM this is reasonable. Lastly, pt's sister reports she is interested in CGM again, will not be covered through medicare given lack of insulin use. Discussed ana lilia w/ coupon is much more affordable than Dexcom. Will try and use ana lilia 3 w/ phone, however pt unsure what generation iPhone she has and it may be on the older side. Instructed pt to have daughter help download Ana Lilia 3 roberto PRIOR to picking up sensors to make sure roberto is compatible.  Plan:  Continue current regimen, focus on dietary changes  Home glucose monitoring:   Will try starting [place] 3+ w/ coupon and using phone  Education Provided to Patient:   Diet review and education  Lipid management:   Therapy: High intensity statin   LDL 69 (3/2024)  Smoking cessation:  Pt currently smoking 3/4 PPD  No interest in quitting at this time  Renal:  CKD: stage 2 - GFR 60-89  ACR:  (6/2023)  Renal protective agents: ACEi/ARB and SGLT2i  DM medications are dosed appropriately for renal function  Labs: ACR previously ordered  PharmD follow-up: 6 months  Endo follow-up: 4/29/25    Patient agreeable to plan as above, contact information provided for any future questions or concerns.    Noah Handy, PharmD    Type of encounter: virtual    Continue all meds under the continuation of care with the referring provider and clinical pharmacy team.

## 2025-02-14 ENCOUNTER — APPOINTMENT (OUTPATIENT)
Dept: PRIMARY CARE | Facility: CLINIC | Age: 70
End: 2025-02-14
Payer: MEDICARE

## 2025-02-14 VITALS
HEART RATE: 97 BPM | SYSTOLIC BLOOD PRESSURE: 104 MMHG | WEIGHT: 140 LBS | OXYGEN SATURATION: 97 % | DIASTOLIC BLOOD PRESSURE: 64 MMHG | BODY MASS INDEX: 22.6 KG/M2

## 2025-02-14 DIAGNOSIS — I10 BENIGN ESSENTIAL HTN: ICD-10-CM

## 2025-02-14 DIAGNOSIS — Z72.0 TOBACCO ABUSE: ICD-10-CM

## 2025-02-14 DIAGNOSIS — J43.8 OTHER EMPHYSEMA (MULTI): ICD-10-CM

## 2025-02-14 DIAGNOSIS — D64.9 ANEMIA, UNSPECIFIED TYPE: ICD-10-CM

## 2025-02-14 DIAGNOSIS — E78.2 MIXED HYPERLIPIDEMIA: ICD-10-CM

## 2025-02-14 DIAGNOSIS — E11.65 TYPE 2 DIABETES MELLITUS WITH HYPERGLYCEMIA, WITHOUT LONG-TERM CURRENT USE OF INSULIN: ICD-10-CM

## 2025-02-14 DIAGNOSIS — K21.9 CHRONIC GERD: ICD-10-CM

## 2025-02-14 DIAGNOSIS — Z12.5 SCREENING FOR PROSTATE CANCER: ICD-10-CM

## 2025-02-14 DIAGNOSIS — Z00.00 ROUTINE GENERAL MEDICAL EXAMINATION AT A HEALTH CARE FACILITY: Primary | ICD-10-CM

## 2025-02-14 DIAGNOSIS — Z00.00 ROUTINE GENERAL MEDICAL EXAMINATION AT HEALTH CARE FACILITY: ICD-10-CM

## 2025-02-14 DIAGNOSIS — G80.8 OTHER CEREBRAL PALSY: ICD-10-CM

## 2025-02-14 PROCEDURE — 1123F ACP DISCUSS/DSCN MKR DOCD: CPT | Performed by: PHYSICIAN ASSISTANT

## 2025-02-14 PROCEDURE — 3078F DIAST BP <80 MM HG: CPT | Performed by: PHYSICIAN ASSISTANT

## 2025-02-14 PROCEDURE — 4004F PT TOBACCO SCREEN RCVD TLK: CPT | Performed by: PHYSICIAN ASSISTANT

## 2025-02-14 PROCEDURE — 1170F FXNL STATUS ASSESSED: CPT | Performed by: PHYSICIAN ASSISTANT

## 2025-02-14 PROCEDURE — G0439 PPPS, SUBSEQ VISIT: HCPCS | Performed by: PHYSICIAN ASSISTANT

## 2025-02-14 PROCEDURE — 1160F RVW MEDS BY RX/DR IN RCRD: CPT | Performed by: PHYSICIAN ASSISTANT

## 2025-02-14 PROCEDURE — 3074F SYST BP LT 130 MM HG: CPT | Performed by: PHYSICIAN ASSISTANT

## 2025-02-14 PROCEDURE — 1126F AMNT PAIN NOTED NONE PRSNT: CPT | Performed by: PHYSICIAN ASSISTANT

## 2025-02-14 PROCEDURE — 1157F ADVNC CARE PLAN IN RCRD: CPT | Performed by: PHYSICIAN ASSISTANT

## 2025-02-14 PROCEDURE — 1159F MED LIST DOCD IN RCRD: CPT | Performed by: PHYSICIAN ASSISTANT

## 2025-02-14 ASSESSMENT — ENCOUNTER SYMPTOMS
COLOR CHANGE: 0
TREMORS: 0
SLEEP DISTURBANCE: 0
DIARRHEA: 0
ABDOMINAL PAIN: 0
APPETITE CHANGE: 0
MYALGIAS: 1
SHORTNESS OF BREATH: 0
OCCASIONAL FEELINGS OF UNSTEADINESS: 1
ARTHRALGIAS: 1
DIZZINESS: 0
CONSTIPATION: 0
NERVOUS/ANXIOUS: 0
PALPITATIONS: 0
WHEEZING: 0
ACTIVITY CHANGE: 0
LOSS OF SENSATION IN FEET: 0
CHEST TIGHTNESS: 0
NAUSEA: 0
BLOOD IN STOOL: 0
UNEXPECTED WEIGHT CHANGE: 1
FREQUENCY: 0
LIGHT-HEADEDNESS: 0
DEPRESSION: 0

## 2025-02-14 ASSESSMENT — ACTIVITIES OF DAILY LIVING (ADL)
DRESSING: DEPENDENT
BATHING: DEPENDENT
GROCERY_SHOPPING: TOTAL CARE
MANAGING_FINANCES: TOTAL CARE
TAKING_MEDICATION: TOTAL CARE
DOING_HOUSEWORK: TOTAL CARE

## 2025-02-14 ASSESSMENT — PAIN SCALES - GENERAL: PAINLEVEL_OUTOF10: 0-NO PAIN

## 2025-02-14 NOTE — ASSESSMENT & PLAN NOTE
Will keep an eye on his blood pressure but will stop losartan  Orders:    Lipid Panel; Future    TSH with reflex to Free T4 if abnormal; Future

## 2025-02-14 NOTE — PROGRESS NOTES
Subjective   Reason for Visit: Benjie Waters is an 69 y.o. male here for a Medicare Wellness visit.     Past Medical, Surgical, and Family History reviewed and updated in chart.    Reviewed all medications by prescribing practitioner or clinical pharmacist (such as prescriptions, OTCs, herbal therapies and supplements) and documented in the medical record.    HPI  Sees podiatry every 3 months.  Sees cardiology on a regular basis.  Will stop Losartan.  Cardiologist recently.  Is on Jardiance which will help him tremendously with his kidney function and blood pressure as well.  He is actually too low today 104/64.  His sister lives with him and they premature together all the time.  Sometimes her family members will help but otherwise she is pretty much on her own.  Still smokes unfortunately.  History of lung cancer sees pulmonology  Patient Care Team:  Meseret De La Torre PA-C as PCP - General (Family Medicine)     Review of Systems   Constitutional:  Positive for unexpected weight change. Negative for activity change and appetite change.   HENT:  Negative for dental problem.    Eyes:  Positive for visual disturbance.        Is blind   Respiratory:  Negative for chest tightness, shortness of breath and wheezing.    Cardiovascular:  Negative for chest pain, palpitations and leg swelling.   Gastrointestinal:  Negative for abdominal pain, blood in stool, constipation, diarrhea and nausea.   Endocrine: Negative for cold intolerance and heat intolerance.   Genitourinary:  Negative for frequency and urgency.   Musculoskeletal:  Positive for arthralgias and myalgias.   Skin:  Negative for color change.   Allergic/Immunologic: Negative for immunocompromised state.   Neurological:  Negative for dizziness, tremors and light-headedness.   Psychiatric/Behavioral:  Negative for behavioral problems and sleep disturbance. The patient is not nervous/anxious.        Objective   Vitals:  /64   Pulse 97   Wt 63.5 kg (140  lb)   SpO2 97%   BMI 22.60 kg/m²       Physical Exam  Constitutional:       Comments: Is blind wears hearing aids   HENT:      Right Ear: Tympanic membrane normal.      Left Ear: Tympanic membrane normal.      Nose: Nose normal.      Mouth/Throat:      Mouth: Mucous membranes are moist.   Neck:      Thyroid: No thyromegaly.      Vascular: No carotid bruit.   Cardiovascular:      Rate and Rhythm: Normal rate and regular rhythm.      Pulses: Normal pulses.   Pulmonary:      Effort: Pulmonary effort is normal. No respiratory distress.   Abdominal:      General: Bowel sounds are normal.      Tenderness: There is no abdominal tenderness.   Genitourinary:     Comments: Deferred  Musculoskeletal:      Cervical back: Normal range of motion. No tenderness.      Right lower leg: No edema.      Left lower leg: No edema.   Skin:     General: Skin is warm.   Neurological:      General: No focal deficit present.      Mental Status: He is alert. Mental status is at baseline.   Psychiatric:         Mood and Affect: Mood normal.         Thought Content: Thought content normal.         Judgment: Judgment normal.         Assessment & Plan  Type 2 diabetes mellitus with hyperglycemia, without long-term current use of insulin  And sees endocrinology  Orders:    TSH with reflex to Free T4 if abnormal; Future    Other cerebral palsy         Other emphysema (Multi)  Follow-up with pulmonology       Chronic GERD  Does pretty well has not had any vomiting episodes       Mixed hyperlipidemia    Orders:    Lipid Panel; Future    TSH with reflex to Free T4 if abnormal; Future    Benign essential HTN  Will keep an eye on his blood pressure but will stop losartan  Orders:    Lipid Panel; Future    TSH with reflex to Free T4 if abnormal; Future    Anemia, unspecified type  Sees hematology  Orders:    TSH with reflex to Free T4 if abnormal; Future    Tobacco abuse  Suggest he quit smoking but he of course does not want to       Routine general  medical examination at a health care facility         Screening for prostate cancer    Orders:    Prostate Specific Antigen, Screen; Future    Routine general medical examination at health care facility    Orders:    1 Year Follow Up In Primary Care - Wellness Exam; Future

## 2025-03-11 LAB
CHOLEST SERPL-MCNC: 126 MG/DL
CHOLEST/HDLC SERPL: 3.1 (CALC)
HDLC SERPL-MCNC: 41 MG/DL
LDLC SERPL CALC-MCNC: 67 MG/DL (CALC)
NONHDLC SERPL-MCNC: 85 MG/DL (CALC)
PSA SERPL-MCNC: 0.24 NG/ML
TRIGL SERPL-MCNC: 98 MG/DL
TSH SERPL-ACNC: 1.59 MIU/L (ref 0.4–4.5)

## 2025-03-12 DIAGNOSIS — E11.9 DIABETES MELLITUS TYPE 2 WITHOUT RETINOPATHY (MULTI): ICD-10-CM

## 2025-03-12 RX ORDER — METFORMIN HYDROCHLORIDE 500 MG/1
1000 TABLET, EXTENDED RELEASE ORAL 2 TIMES DAILY
Qty: 360 TABLET | Refills: 1 | Status: SHIPPED | OUTPATIENT
Start: 2025-03-12

## 2025-03-18 DIAGNOSIS — J43.9 PULMONARY EMPHYSEMA, UNSPECIFIED EMPHYSEMA TYPE (MULTI): ICD-10-CM

## 2025-03-19 RX ORDER — MONTELUKAST SODIUM 10 MG/1
10 TABLET ORAL DAILY
Qty: 90 TABLET | Refills: 1 | Status: SHIPPED | OUTPATIENT
Start: 2025-03-19

## 2025-04-15 ENCOUNTER — APPOINTMENT (OUTPATIENT)
Dept: AUDIOLOGY | Facility: CLINIC | Age: 70
End: 2025-04-15
Payer: MEDICAID

## 2025-04-15 ENCOUNTER — HOSPITAL ENCOUNTER (OUTPATIENT)
Dept: RADIOLOGY | Facility: HOSPITAL | Age: 70
Discharge: HOME | End: 2025-04-15
Payer: MEDICARE

## 2025-04-15 ENCOUNTER — APPOINTMENT (OUTPATIENT)
Dept: OTOLARYNGOLOGY | Facility: CLINIC | Age: 70
End: 2025-04-15
Payer: MEDICAID

## 2025-04-15 VITALS — BODY MASS INDEX: 22.66 KG/M2 | WEIGHT: 141 LBS | HEIGHT: 66 IN

## 2025-04-15 DIAGNOSIS — H69.93 DYSFUNCTION OF BOTH EUSTACHIAN TUBES: Primary | ICD-10-CM

## 2025-04-15 DIAGNOSIS — H90.A22 SENSORINEURAL HEARING LOSS (SNHL) OF LEFT EAR WITH RESTRICTED HEARING OF RIGHT EAR: ICD-10-CM

## 2025-04-15 DIAGNOSIS — H90.A31 MIXED CONDUCTIVE AND SENSORINEURAL HEARING LOSS OF RIGHT EAR WITH RESTRICTED HEARING OF LEFT EAR: ICD-10-CM

## 2025-04-15 DIAGNOSIS — F79 INTELLECTUAL DISABILITY: ICD-10-CM

## 2025-04-15 DIAGNOSIS — H61.23 BILATERAL IMPACTED CERUMEN: ICD-10-CM

## 2025-04-15 DIAGNOSIS — H90.A31 MIXED CONDUCTIVE AND SENSORINEURAL HEARING LOSS OF RIGHT EAR WITH RESTRICTED HEARING OF LEFT EAR: Primary | ICD-10-CM

## 2025-04-15 DIAGNOSIS — Z98.890 HISTORY OF LUNG SURGERY: ICD-10-CM

## 2025-04-15 DIAGNOSIS — H90.6 MIXED CONDUCTIVE AND SENSORINEURAL HEARING LOSS OF BOTH EARS: ICD-10-CM

## 2025-04-15 PROCEDURE — 3008F BODY MASS INDEX DOCD: CPT | Performed by: OTOLARYNGOLOGY

## 2025-04-15 PROCEDURE — 1157F ADVNC CARE PLAN IN RCRD: CPT | Performed by: OTOLARYNGOLOGY

## 2025-04-15 PROCEDURE — 92557 COMPREHENSIVE HEARING TEST: CPT | Performed by: AUDIOLOGIST

## 2025-04-15 PROCEDURE — 92567 TYMPANOMETRY: CPT | Performed by: AUDIOLOGIST

## 2025-04-15 PROCEDURE — V5014 HEARING AID REPAIR/MODIFYING: HCPCS | Performed by: AUDIOLOGIST

## 2025-04-15 PROCEDURE — 1159F MED LIST DOCD IN RCRD: CPT | Performed by: OTOLARYNGOLOGY

## 2025-04-15 PROCEDURE — 99213 OFFICE O/P EST LOW 20 MIN: CPT | Performed by: OTOLARYNGOLOGY

## 2025-04-15 PROCEDURE — 4004F PT TOBACCO SCREEN RCVD TLK: CPT | Performed by: OTOLARYNGOLOGY

## 2025-04-15 PROCEDURE — 1123F ACP DISCUSS/DSCN MKR DOCD: CPT | Performed by: OTOLARYNGOLOGY

## 2025-04-15 PROCEDURE — 71250 CT THORAX DX C-: CPT

## 2025-04-15 NOTE — PROGRESS NOTES
AUDIOLOGY ADULT AUDIOMETRIC EVALUATION    Name:  Benjie Waters  :  1955  Age:  69 y.o.  Date of Evaluation:  April 15, 2025    Reason for visit: Mr. Waters is seen in the clinic today at the request of Lupillo Weinstein MD in otolaryngology for an audiologic evaluation.     HISTORY  The patient currently has a tube in his left ear and a large tympanic membrane perforation in his right ear.  He wears binaural hearing aids.    EVALUATION  See scanned audiogram: “Media” > “Audiology Report”.      RESULTS  Otoscopic Evaluation:  Right Ear: clear ear canal  Left Ear: clear ear canal    Immittance Measures:  Tympanometry:  Right Ear: normal middle ear pressure, reduced tympanic membrane mobility, large ear canal volume  Left Ear: large ear canal volume consistent with a tympanic membrane perforation or a patent pressure equalization tube     Acoustic Reflexes:  Ipsilateral Right Ear: did not evaluate   Ipsilateral Left Ear: did not evaluate   Contralateral Right Ear: did not evaluate  Contralateral Left Ear: did not evaluate    Distortion Product Otoacoustic Emissions (DPOAEs):  Right Ear: did not evaluate   Left Ear: did not evaluate     Audiometry:  Test Technique and Reliability:   Standard audiometry via supra-aural headphones. Reliability is good.    Pure tone air and bone conduction audiometry:  Right Ear: moderately-severe sloping to profound mixed hearing loss  Left Ear: moderate sloping to severe sensorineural hearing loss     Speech Audiometry (Word Recognition Scores):   (Best estimates as speech is not clear)  Right Ear: Good, 84% in quiet at an elevated presentation level   Left Ear: Poor, 68% in quiet at an elevated presentation level     IMPRESSIONS  Results of today's audiometric evaluation revealed a mixed hearing loss in the right ear and a sensorineural hearing loss in the left ear.  Results showed slight progression compared with the previous hearing evaluation from May 2, 2023.       RECOMMENDATIONS  - Follow up with otolaryngology today as scheduled.  - Audiologic evaluation in conjunction with otologic care, if an acute change is noted, and/or annually.  - Continued hearing aid use.  - Follow-up with audiology annually for routine hearing aid maintenance, sooner if questions/problems arise.  - Follow-up with medical care team as planned.    PATIENT EDUCATION  Discussed results, impressions and recommendations with the patient and his sister. Questions were addressed and the patient and his sister were encouraged to contact our office should concerns arise.    Time for this encounter: 9:30-10:00    Christine Haider M.A., CCC-A   Licensed Audiologist

## 2025-04-15 NOTE — PROGRESS NOTES
Chief Complaint   Patient presents with    Cerumen Impaction     LOV: 1/2025 EAR CLEANING, LEFT TUBE CHECK      HPI  He is here for every 3 month ear cleaning.  Occasional left otorrhea.  Audio stable.  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  Benjie Waters is a 69 y.o. male with recurrent cerumen impactions. He is s/p left tube placed March 23, 2022 and a right perforation. 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   History:  with severe MR. He is status post left myringotomy that has healed and right tympanic membrane perforation. He has a chronic eustachian tube dysfunction bilaterally. He smokes. He is using Allegra for his allergies      Past Medical History:   Diagnosis Date    Anemia     Aphakia, unspecified eye 10/04/2022    Aphakia    Arthritis     Cataract     Cerebral palsy     COPD (chronic obstructive pulmonary disease) (Multi)     Coronary artery disease     Dry eye syndrome of bilateral lacrimal glands 12/27/2017    Dry eye syndrome, bilateral    GERD (gastroesophageal reflux disease)     Glaucoma     Hearing aid worn     HL (hearing loss)     Hyperlipidemia     Ischemic optic neuropathy, unspecified eye 10/04/2022    AION (anterior ischemic optic neuropathy)    Lung nodule     right    Personal history of other infectious and parasitic diseases     History of athlete's foot    Shortness of breath     Skin cancer of nose     Smoker     Tinea unguium     Mycotic toenails    Type 2 diabetes mellitus             Medications:     Current Outpatient Medications:     acetaminophen (Tylenol) 325 mg tablet, Take 3 tablets (975 mg) by mouth every 8 hours., Disp: , Rfl:     artificial tears, dextran-hypomel-glycerin, 0.1-0.3-0.2 % ophthalmic solution, Administer 1 drop into affected eye(s) 4 times a day., Disp: , Rfl:     ascorbic acid (Vitamin C)  "1,000 mg tablet, Take 1 tablet (1,000 mg) by mouth once daily., Disp: , Rfl:     aspirin 81 mg EC tablet, Take 1 tablet (81 mg) by mouth once daily., Disp: , Rfl:     atorvastatin (Lipitor) 40 mg tablet, TAKE 1 TABLET BY MOUTH EVERY DAY, Disp: 90 tablet, Rfl: 0    blood sugar diagnostic (OneTouch Verio test strips) strip, Use to check blood sugar once daily, Disp: 100 strip, Rfl: 3    blood-glucose sensor (FreeStyle Ana Lilia 3 Plus Sensor) device, Use to monitor blood sugar, Disp: 2 each, Rfl: 11    empagliflozin (Jardiance) 10 mg, Take 1 tablet (10 mg) by mouth once daily., Disp: 90 tablet, Rfl: 3    fexofenadine (Allegra Allergy) 180 mg tablet, Take 1 tablet (180 mg) by mouth once daily as needed. TAKE PER DIRECTED, Disp: , Rfl:     flaxseed oiL 1,000 mg capsule, Take 1 capsule (1,000 mg) by mouth once daily. TAKE PER DIECTED, Disp: , Rfl:     lancets (OneTouch Delica Plus Lancet) 33 gauge misc, Use to check blood sugar once daily, Disp: 100 each, Rfl: 3    metFORMIN  mg 24 hr tablet, TAKE 2 TABLETS (1000 MG) BY MOUTH TWICE A DAY, Disp: 360 tablet, Rfl: 1    montelukast (Singulair) 10 mg tablet, TAKE 1 TABLET BY MOUTH EVERY DAY, Disp: 90 tablet, Rfl: 1    pantoprazole (ProtoNix) 40 mg EC tablet, TAKE 1 TABLET BY MOUTH EVERY DAY, Disp: 90 tablet, Rfl: 2    tobramycin-dexamethasone (Tobradex) ophthalmic ointment, Apply 0.5 inches to both eyes 3 times a day., Disp: 5 g, Rfl: 3     Allergies:  Allergies   Allergen Reactions    Bacitracin Unknown    Erythromycin Unknown    Latex Unknown        Physical Exam:  Last Recorded Vitals  Height 1.676 m (5' 6\"), weight 64 kg (141 lb).  []General appearance: Well-developed, well-nourished in no acute distress, conversant with normal voice quality    Head/face: No erythema or edema or facial tenderness, and normal facial nerve function bilaterally    External ear: Clear external auditory canals with normal pinnae, bilateral cerumen impactions removed  Tube status: Left T-tube " in place  Middle ear: Large right tympanic membrane perforation.  Left middle ear mucosa looks normal  Tympanic membrane perforation: N/A  Mastoid bowl: N/A  Hearing: Normal conversational awareness at normal speech thresholds    Nose visualized using: Anterior rhinoscopy  Nasal dorsum: Nontraumatic midline appearance  Septum: Midline, nonobstructing  Inferior turbinates: Normal, pink  Secretions: Dry    Oral cavity and oropharynx: Normal  Teeth: Good condition  Floor of mouth: without lesions  Palate: Normal hard palate, soft palate and uvula  Oropharynx: Clear, no lesions present  Buccal mucosa: Normal without masses or lesions  Lips: Normal    Nasopharynx: Inadequate mirror exam secondary to gag/anatomy    Neck:  Salivary glands: Normal bilateral parotid and submandibular glands by inspection and palpation.  Non-thyroid masses: No palpable masses or significant lymphadenopathy  Trachea: Midline  Thyroid: No thyromegaly or palpable nodules  Temporomandibular joint: Nontender  Cervical range of motion: Normal    Neurologic exam: Alert and oriented x3, appropriate affect.  Cranial nerves II-XII normal bilaterally  Extraocular movement: Extraocular movement intact, normal gaze alignment    Assessment/Plan   Encounter Diagnoses   Name Primary?    Dysfunction of both eustachian tubes Yes    Mixed conductive and sensorineural hearing loss of both ears     Intellectual disability     Bilateral impacted cerumen                    Ears cleaned.  Recheck in 3 months.  Tube in good position.  Continue with hearing aid use.  Ciprodex for drainage if and when it happens    Lupillo Weinstein MD

## 2025-04-15 NOTE — PROGRESS NOTES
HEARING AID CHECK     RIGHT: RESOUND OMNIA 588 RECHARGEABLE BTE WITH METAL EARHOOK AND STANDARD EARMOLD  S.N.: [id number]  LEFT: RESOUND OMNIA 588 RECHARGEABLE BTE WITH PLASTIC EARHOOK AND STANDARD EARMOLD  S.N.: [id number]  WARRANTY EXPIRES: 6/26/2023     The patient is being seen today for his annual hearing aid clean and check.  The patient's sister reported that Benjie is hearing well overall, but often asks her to repeat what was said on television game shows.  Otoscopy revealed clear ear canals bilaterally.  Tested his hearing and noted a slight decrease in hearing compared to his previous audiogram.  Put the new hearing thresholds in the hearing aid software.  Cleaned and dehumidified both hearing aids and vacuumed the microphones.  Replaced the tubing in both earmolds.  The post cleaning listening check revealed good sound quality in both hearing aids.  Connected the hearing aids to the software and recalculated the fitting to accommodate any change in hearing.  The patient reported that he could hear well after today's cleaning and adjustments.   Recall in six months for a tubing replacement and hearing aid cleaning.  $50.00.     APPOINTMENT TIME: 10:00-10:30

## 2025-04-22 ENCOUNTER — OFFICE VISIT (OUTPATIENT)
Facility: CLINIC | Age: 70
End: 2025-04-22
Payer: MEDICARE

## 2025-04-22 VITALS
BODY MASS INDEX: 23.49 KG/M2 | HEIGHT: 65 IN | WEIGHT: 141 LBS | SYSTOLIC BLOOD PRESSURE: 108 MMHG | DIASTOLIC BLOOD PRESSURE: 70 MMHG | RESPIRATION RATE: 18 BRPM | HEART RATE: 57 BPM

## 2025-04-22 DIAGNOSIS — Z98.890 HISTORY OF LUNG SURGERY: ICD-10-CM

## 2025-04-22 DIAGNOSIS — C34.11 MALIGNANT NEOPLASM OF UPPER LOBE OF RIGHT LUNG (MULTI): Primary | ICD-10-CM

## 2025-04-22 PROCEDURE — 1157F ADVNC CARE PLAN IN RCRD: CPT | Performed by: THORACIC SURGERY (CARDIOTHORACIC VASCULAR SURGERY)

## 2025-04-22 PROCEDURE — 99215 OFFICE O/P EST HI 40 MIN: CPT | Performed by: THORACIC SURGERY (CARDIOTHORACIC VASCULAR SURGERY)

## 2025-04-22 PROCEDURE — 1159F MED LIST DOCD IN RCRD: CPT | Performed by: THORACIC SURGERY (CARDIOTHORACIC VASCULAR SURGERY)

## 2025-04-22 PROCEDURE — 1123F ACP DISCUSS/DSCN MKR DOCD: CPT | Performed by: THORACIC SURGERY (CARDIOTHORACIC VASCULAR SURGERY)

## 2025-04-22 PROCEDURE — 4004F PT TOBACCO SCREEN RCVD TLK: CPT | Performed by: THORACIC SURGERY (CARDIOTHORACIC VASCULAR SURGERY)

## 2025-04-22 PROCEDURE — 3074F SYST BP LT 130 MM HG: CPT | Performed by: THORACIC SURGERY (CARDIOTHORACIC VASCULAR SURGERY)

## 2025-04-22 PROCEDURE — 1126F AMNT PAIN NOTED NONE PRSNT: CPT | Performed by: THORACIC SURGERY (CARDIOTHORACIC VASCULAR SURGERY)

## 2025-04-22 PROCEDURE — 3008F BODY MASS INDEX DOCD: CPT | Performed by: THORACIC SURGERY (CARDIOTHORACIC VASCULAR SURGERY)

## 2025-04-22 PROCEDURE — 3078F DIAST BP <80 MM HG: CPT | Performed by: THORACIC SURGERY (CARDIOTHORACIC VASCULAR SURGERY)

## 2025-04-22 ASSESSMENT — LIFESTYLE VARIABLES
HOW OFTEN DO YOU HAVE SIX OR MORE DRINKS ON ONE OCCASION: NEVER
HOW MANY STANDARD DRINKS CONTAINING ALCOHOL DO YOU HAVE ON A TYPICAL DAY: PATIENT DOES NOT DRINK
SKIP TO QUESTIONS 9-10: 1
HOW OFTEN DO YOU HAVE A DRINK CONTAINING ALCOHOL: NEVER
AUDIT-C TOTAL SCORE: 0

## 2025-04-22 ASSESSMENT — ENCOUNTER SYMPTOMS
NEUROLOGICAL NEGATIVE: 1
CHOKING: 0
APPETITE CHANGE: 0
NAUSEA: 0
CHILLS: 0
COUGH: 0
ALLERGIC/IMMUNOLOGIC NEGATIVE: 1
WHEEZING: 0
UNEXPECTED WEIGHT CHANGE: 0
ABDOMINAL PAIN: 0
VOMITING: 0
DIARRHEA: 0
PSYCHIATRIC NEGATIVE: 1
OCCASIONAL FEELINGS OF UNSTEADINESS: 0
DIAPHORESIS: 0
FEVER: 0
CHEST TIGHTNESS: 0
EYES NEGATIVE: 1
DEPRESSION: 0
PALPITATIONS: 0
LOSS OF SENSATION IN FEET: 0
ENDOCRINE NEGATIVE: 1
SHORTNESS OF BREATH: 0
ABDOMINAL DISTENTION: 0
CONSTIPATION: 0
FATIGUE: 0
MUSCULOSKELETAL NEGATIVE: 1
STRIDOR: 0
HEMATOLOGIC/LYMPHATIC NEGATIVE: 1

## 2025-04-22 ASSESSMENT — PAIN SCALES - GENERAL: PAINLEVEL_OUTOF10: 0-NO PAIN

## 2025-04-22 NOTE — PROGRESS NOTES
Subjective   Patient ID: Benjie Waters is a 69 y.o. male who presents for Follow-up (6 mo).  Wound Check        68-year-old male with developmental delay and cerebral palsy who is a smoker.  He was found to have a suspicious right upper lobe lung nodule was been increasing in size.  This is solid nodule that measures about 11 mm in size.  He has been seen by pulmonology.  Pulmonary function test was attempted but it is hard for him to follow instructions.  It is also hard for him to stay still so obtaining a PET scan is not possible.  Same issues with obtaining an IR guided biopsy of this nodule.  I had a candid discussion with his sister about the options.  1 option is to do nothing and continue follow-up with imaging which she is not interested in the other option is to perform a wedge resection of this lesion.  He does not endorse any shortness of breath and has not complain about anything but I think there is a risk of doing this without having more objective data.  However, I think the nodules and a reasonable location for a wedge resection.  I discussed with her the mechanics of a robotic assisted right upper lobe wedge resection with mediastinal lymph nodes.  [place] [facility]    Update 10/15/2024  He is recovering well from his lung resection.  This lung nodule turned out to be a lung cancer.  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  He had some issues with his most anterior wound that opened up and looked a little red initially.  He was on a short course of antibiotics for this.  The wound today mildly open but does not have any signs of infection there is no redness and no drainage.  Instructed the sister to continue with dry dressings.  I will see him again in 6 months with a CT of the chest.    Update 4/22/2025  He has been doing well unfortunately continues to smoke.  CT scan  showing stable findings with stable nodules and no evidence of recurrence.  We had a very candid discussion about smoking cessation.  I will see them again in 6 months with CT of the chest.    Review of Systems   Constitutional:  Negative for appetite change, chills, diaphoresis, fatigue, fever and unexpected weight change.   HENT: Negative.     Eyes: Negative.    Respiratory:  Negative for cough, choking, chest tightness, shortness of breath, wheezing and stridor.    Cardiovascular:  Negative for chest pain, palpitations and leg swelling.   Gastrointestinal:  Negative for abdominal distention, abdominal pain, constipation, diarrhea, nausea and vomiting.   Endocrine: Negative.    Genitourinary: Negative.    Musculoskeletal: Negative.    Skin: Negative.    Allergic/Immunologic: Negative.    Neurological: Negative.    Hematological: Negative.    Psychiatric/Behavioral: Negative.     All other systems reviewed and are negative.      Objective   Physical Exam  Constitutional:       Appearance: Normal appearance.   HENT:      Head: Normocephalic and atraumatic.      Nose: Nose normal.      Mouth/Throat:      Mouth: Mucous membranes are moist.      Pharynx: Oropharynx is clear.   Eyes:      Extraocular Movements: Extraocular movements intact.      Conjunctiva/sclera: Conjunctivae normal.      Pupils: Pupils are equal, round, and reactive to light.   Cardiovascular:      Rate and Rhythm: Normal rate and regular rhythm.      Pulses: Normal pulses.      Heart sounds: Normal heart sounds.   Pulmonary:      Effort: Pulmonary effort is normal. No respiratory distress.      Breath sounds: Normal breath sounds. No stridor. No wheezing, rhonchi or rales.   Chest:      Chest wall: No tenderness.   Abdominal:      General: Abdomen is flat. Bowel sounds are normal.      Palpations: Abdomen is soft.   Musculoskeletal:         General: Normal range of motion.      Cervical back: Normal range of motion and neck supple.   Skin:      General: Skin is warm and dry.      Capillary Refill: Capillary refill takes less than 2 seconds.   Neurological:      General: No focal deficit present.      Mental Status: He is alert and oriented to person, place, and time.         Assessment/Plan   Diagnoses and all orders for this visit:  Lung cancer right upper lobe  -    Follow-up in 6 months with a CT of the chest.         Cyrus Johnson MD 04/22/25 9:41 AM

## 2025-04-25 ENCOUNTER — TELEPHONE (OUTPATIENT)
Dept: ENDOCRINOLOGY | Facility: CLINIC | Age: 70
End: 2025-04-25
Payer: MEDICARE

## 2025-04-25 NOTE — TELEPHONE ENCOUNTER
lvm regarding appt reminder/ is patient still using the Dexcom, please upload information and make sure connected to the office. Office phone number provided on message for call back.

## 2025-04-28 ENCOUNTER — TELEPHONE (OUTPATIENT)
Dept: ENDOCRINOLOGY | Facility: CLINIC | Age: 70
End: 2025-04-28
Payer: MEDICARE

## 2025-04-28 NOTE — TELEPHONE ENCOUNTER
Did not see the jon acct after his sister said he was using jon/ unless it is under a different name entirely/ I checked under his caregiver name as well.

## 2025-04-29 ENCOUNTER — APPOINTMENT (OUTPATIENT)
Dept: ENDOCRINOLOGY | Facility: CLINIC | Age: 70
End: 2025-04-29
Payer: MEDICARE

## 2025-04-29 VITALS
HEART RATE: 103 BPM | DIASTOLIC BLOOD PRESSURE: 65 MMHG | SYSTOLIC BLOOD PRESSURE: 95 MMHG | HEIGHT: 66 IN | BODY MASS INDEX: 22.13 KG/M2 | WEIGHT: 137.7 LBS

## 2025-04-29 DIAGNOSIS — E11.649 TYPE 2 DIABETES MELLITUS WITH HYPOGLYCEMIA WITHOUT COMA, WITHOUT LONG-TERM CURRENT USE OF INSULIN: Primary | ICD-10-CM

## 2025-04-29 LAB — POC HEMOGLOBIN A1C: 6.9 % (ref 4.2–6.5)

## 2025-04-29 PROCEDURE — 1123F ACP DISCUSS/DSCN MKR DOCD: CPT | Performed by: NURSE PRACTITIONER

## 2025-04-29 PROCEDURE — 3044F HG A1C LEVEL LT 7.0%: CPT | Performed by: NURSE PRACTITIONER

## 2025-04-29 PROCEDURE — 83036 HEMOGLOBIN GLYCOSYLATED A1C: CPT | Performed by: NURSE PRACTITIONER

## 2025-04-29 PROCEDURE — 3008F BODY MASS INDEX DOCD: CPT | Performed by: NURSE PRACTITIONER

## 2025-04-29 PROCEDURE — 1159F MED LIST DOCD IN RCRD: CPT | Performed by: NURSE PRACTITIONER

## 2025-04-29 PROCEDURE — 1126F AMNT PAIN NOTED NONE PRSNT: CPT | Performed by: NURSE PRACTITIONER

## 2025-04-29 PROCEDURE — 1160F RVW MEDS BY RX/DR IN RCRD: CPT | Performed by: NURSE PRACTITIONER

## 2025-04-29 PROCEDURE — 4004F PT TOBACCO SCREEN RCVD TLK: CPT | Performed by: NURSE PRACTITIONER

## 2025-04-29 PROCEDURE — 95251 CONT GLUC MNTR ANALYSIS I&R: CPT | Performed by: NURSE PRACTITIONER

## 2025-04-29 PROCEDURE — 1157F ADVNC CARE PLAN IN RCRD: CPT | Performed by: NURSE PRACTITIONER

## 2025-04-29 PROCEDURE — 3078F DIAST BP <80 MM HG: CPT | Performed by: NURSE PRACTITIONER

## 2025-04-29 PROCEDURE — G2211 COMPLEX E/M VISIT ADD ON: HCPCS | Performed by: NURSE PRACTITIONER

## 2025-04-29 PROCEDURE — 3074F SYST BP LT 130 MM HG: CPT | Performed by: NURSE PRACTITIONER

## 2025-04-29 PROCEDURE — 99214 OFFICE O/P EST MOD 30 MIN: CPT | Performed by: NURSE PRACTITIONER

## 2025-04-29 RX ORDER — BRIMONIDINE TARTRATE 2 MG/ML
1 SOLUTION/ DROPS OPHTHALMIC 2 TIMES DAILY
COMMUNITY
Start: 2025-04-03

## 2025-04-29 RX ORDER — BLOOD-GLUCOSE,RECEIVER,CONT
EACH MISCELLANEOUS
Qty: 1 EACH | Refills: 0 | Status: SHIPPED | OUTPATIENT
Start: 2025-04-29

## 2025-04-29 RX ORDER — METFORMIN HYDROCHLORIDE 500 MG/1
500 TABLET, EXTENDED RELEASE ORAL
COMMUNITY
Start: 2025-04-29

## 2025-04-29 ASSESSMENT — PATIENT HEALTH QUESTIONNAIRE - PHQ9
1. LITTLE INTEREST OR PLEASURE IN DOING THINGS: NOT AT ALL
SUM OF ALL RESPONSES TO PHQ9 QUESTIONS 1 AND 2: 0
2. FEELING DOWN, DEPRESSED OR HOPELESS: NOT AT ALL

## 2025-04-29 ASSESSMENT — ENCOUNTER SYMPTOMS
OCCASIONAL FEELINGS OF UNSTEADINESS: 1
DEPRESSION: 0
LOSS OF SENSATION IN FEET: 0

## 2025-04-29 ASSESSMENT — PAIN SCALES - GENERAL: PAINLEVEL_OUTOF10: 0-NO PAIN

## 2025-04-29 NOTE — Clinical Note
A1c remains at goal.  Will lower metformin today.  He is likely having compression lows to his Ana Lilia and since stopping glimepiride, he has not had true low blood sugars.  He will wear the Ana Lilia a couple more weeks.  See our PharmD to make sure time in range is still at goal with reduction of metformin and then we discussed transitioning care back to you.   Given age, developmental delay, and co-morbidities, recommend A1c 7.5% or less. If anything else is needed from endo, you can always refer him back.  Thanks!

## 2025-04-29 NOTE — PROGRESS NOTES
***********************************  Message from eli on 4/29:  I plan to have him wear CGM for another month.  I reduced metformin and wanted to make sure he is still in range.  He is also taking jardiance.  If still in range at 1 month, she can go back to doing once daily fingersticks.  Patient does not prefer the CGM.  I will transition care back to PCP.  Thanks!    ***********************************     daily    blood-glucose sensor (FreeStyle Ana Lilia 3 Plus Sensor) device Use to monitor blood sugar    brimonidine (AlphaGAN) 0.2 % ophthalmic solution 1 drop, Both Eyes, 2 times daily    empagliflozin (JARDIANCE) 10 mg, oral, Daily    fexofenadine (ALLEGRA ALLERGY) 180 mg, Daily PRN    flaxseed oiL 1,000 mg, Daily    FreeStyle Ana Lilia 3 [place] misc Use as instructed    lancets (OneTouch Delica Plus Lancet) 33 gauge misc Use to check blood sugar once daily    metFORMIN XR (GLUCOPHAGE-XR) 500 mg, oral, 2 times daily (morning and late afternoon)    montelukast (SINGULAIR) 10 mg, oral, Daily    pantoprazole (PROTONIX) 40 mg, oral, Daily    tobramycin-dexamethasone (Tobradex) ophthalmic ointment 0.5 inches, Both Eyes, 3 times daily        Objective   Last Recorded Vitals:  BP Readings from Last 3 Encounters:   04/29/25 95/65   04/22/25 108/70   02/14/25 104/64     Wt Readings from Last 3 Encounters:   04/29/25 62.5 kg (137 lb 11.2 oz)   04/22/25 64 kg (141 lb)   04/15/25 64 kg (141 lb)     BMI Readings from Last 1 Encounters:   04/29/25 22.23 kg/m²      Labs  A1C  Lab Results   Component Value Date    HGBA1C 6.9 (A) 04/29/2025    HGBA1C 6.9 (A) 10/04/2024    HGBA1C 6.5 03/18/2024     BMP/LFTs  Lab Results   Component Value Date    CREATININE 1.11 12/11/2024    CREATININE 1.05 10/22/2024    CREATININE 1.26 09/17/2024    EGFR 72 12/11/2024    EGFR 77 10/22/2024    EGFR 62 09/17/2024    GLUCOSE 158 (H) 12/11/2024     12/11/2024    K 4.4 12/11/2024     12/11/2024    CALCIUM 10.9 (H) 12/11/2024    CO2 30 12/11/2024    BUN 20 12/11/2024    ALT 12 12/11/2024    AST 11 12/11/2024    ALKPHOS 68 12/11/2024    BILITOT 0.5 12/11/2024     Lipids  Lab Results   Component Value Date    TRIG 98 03/10/2025    CHOL 126 03/10/2025    LDLF 62 06/13/2023    LDLCALC 67 03/10/2025    HDL 41 03/10/2025     Urine Albumin Creatinine Ratio  Lab Results   Component Value Date    MICROALBCREA 52.6 (H) 06/13/2023    MICROALBCREA 30.2 (H)  "08/10/2022    MICROALBCREA 18.7 02/11/2021     ASCVD risk  The ASCVD Risk score (Andi DIAZ, et al., 2019) failed to calculate for the following reasons:    The valid total cholesterol range is 130 to 320 mg/dL    Additional labs:  No results found for: \"FRUCTOSAMINE\", \"CPEPTIDE\", \"CDG05OY\", \"NTIB\", \"ZNT8A\", \"INSAB\"    Home glucose monitoring:  Hypoglycemia: denies readings <70 mg/dL or s/sx of hypoglycemia        Assessment/Plan   Type 2 Diabetes Mellitus  Goal A1C <7.5% (h/o lows, age, and comorbidities), at goal for the past year. TIR and GMI at goal, no true hypoglycemia noted. Doing well since decreasing Metformin XR, will continue current medications as is. If needed can always increase metformin again but pt is currently meeting glucose goals. Pt previously reported disliking ana lilia, given low risk regimen and relative control can switch back to fingersticks once daily.  Plan:  Continue current regimen  Home glucose monitoring:   Can switch off Ana Lilia and go back to once daily fingersticks  To provider, please attest CGM interpretation.   Education Provided to Patient:   Diet review and education  Lipid management:   Therapy: High intensity statin   LDL 67 (3/2025)  Smoking cessation:  Pt currently smoking 3/4 PPD  No interest in quitting at this time  Renal:  CKD: stage 2 - GFR 60-89  ACR:  (6/2023)  Renal protective agents: SGLT2i  DM medications are dosed appropriately for renal function  Labs: ACR previously ordered  PharmD follow-up: 6 months  Endo follow-up: none, transitioning DM care to PCP  PCP follow-up: 2/16/26    Patient agreeable to plan as above, contact information provided for any future questions or concerns.    Noah Handy, PharmD    Type of encounter: in person  Provider on site: Shreya Mcnamara    Continue all meds under the continuation of care with the referring provider and clinical pharmacy team.           [1]   Patient Active Problem List  Diagnosis    Allergic " conjunctivitis    Bilateral impacted cerumen    Nuclear sclerotic cataract    Cerebral palsy    Corneal edema    Diabetes mellitus type 2 without retinopathy (Multi)    Tear film insufficiency    Dysfunction of both eustachian tubes    Glaucoma of both eyes    High cholesterol    Intellectual disability    Mixed conductive and sensorineural hearing loss of both ears    Myopic degeneration, left eye    Perforation of left tympanic membrane    Primary open angle glaucoma    Gastroesophageal reflux disease    Benign essential HTN    Pulmonary emphysema (Multi)    Hypercalcemia    Anemia of chronic disease    Anterior ischemic optic neuropathy    Snoring    Glaucoma    Hypertension    Intellectual functioning disability    Lipids abnormal    Low back pain    Mixed dyslipidemia    Nicotine dependence, cigarettes, uncomplicated    Obstructive sleep apnea syndrome    Onychomycosis of toenail    Pulmonary nodules    Type 2 diabetes mellitus    Mixed hyperlipidemia    Hyperopia of both eyes    Dyslipidemia    Lung nodule    Malignant neoplasm of upper lobe of right lung (Multi)

## 2025-04-29 NOTE — ASSESSMENT & PLAN NOTE
Orders:    POCT glycosylated hemoglobin (Hb A1C) manually resulted    FreeStyle Ana Lilia 3 [place] misc; Use as instructed

## 2025-04-29 NOTE — Clinical Note
I plan to have him wear CGM for another month.  I reduced metformin and wanted to make sure he is still in range.  He is also taking jardiance.  If still in range at 1 month, she can go back to doing once daily fingersticks.  Patient does not prefer the CGM.  I will transition care back to PCP.  Thanks!  He has appt in [facility] clinic on Aug 5?  Do you need this? Patient could see [facility] pharmacy if now back with PCP?

## 2025-04-29 NOTE — PROGRESS NOTES
"Subjective   Benjie Waters is a 69 y.o. male here today for a follow up visit regarding Type 2 diabetes.  His caregiver is his sister  Known complications include: HTN, Hyperlipidemia  Personal history of CP    Previously seeing PCP for diabetes care.    A1c today 6.9%.  Previous A1c 6.9% on 10/4/2024.    Since last visit, glimepiride stopped  He is not having symptomatic lows  Now using Ana Lilia  Sister will report low blood sugar alarms.        Historical meds:   Glimepiride 4 mg once daily in the morning - stopped due to lows.     Current diabetes regimen is as follows:   Metformin  mg 2 tablets twice daily   Jardiance 10 mg once daily in evening    Patient is not using continuous glucose monitor - Ana Lilia 3+  The patient is currently checking the blood glucose 1 time per day .        Hypoglycemia frequency: 0% lows, 0% very low  Hypoglycemia awareness: yes     The patient comes into the office today without concerns.        ROS   General: no fever, chills or acute changes in weight in the last 6 months  Skin: no rashes, pruritis or dry skin  Cardiac: denies chest pain, heart palpitations or orthopnea  Pulmonary: denies wheezing, productive cough or exertional dyspnea      Objective    Physical Exam  Blood pressure 95/65, pulse 103, height 1.676 m (5' 6\"), weight 62.5 kg (137 lb 11.2 oz).  General: not in acute distress, cooperative   Respiratory: normal respiratory effort  Musculoskeletal: normal gait       Current Outpatient Medications:     acetaminophen (Tylenol) 325 mg tablet, Take 3 tablets (975 mg) by mouth every 8 hours., Disp: , Rfl:     artificial tears, dextran-hypomel-glycerin, 0.1-0.3-0.2 % ophthalmic solution, Administer 1 drop into affected eye(s) 4 times a day., Disp: , Rfl:     ascorbic acid (Vitamin C) 1,000 mg tablet, Take 1 tablet (1,000 mg) by mouth once daily., Disp: , Rfl:     aspirin 81 mg EC tablet, Take 1 tablet (81 mg) by mouth once daily., Disp: , Rfl:     atorvastatin (Lipitor) " 40 mg tablet, TAKE 1 TABLET BY MOUTH EVERY DAY, Disp: 90 tablet, Rfl: 0    blood sugar diagnostic (OneTouch Verio test strips) strip, Use to check blood sugar once daily, Disp: 100 strip, Rfl: 3    blood-glucose sensor (FreeStyle Ana Lilia 3 Plus Sensor) device, Use to monitor blood sugar, Disp: 2 each, Rfl: 11    empagliflozin (Jardiance) 10 mg, Take 1 tablet (10 mg) by mouth once daily., Disp: 90 tablet, Rfl: 3    fexofenadine (Allegra Allergy) 180 mg tablet, Take 1 tablet (180 mg) by mouth once daily as needed. TAKE PER DIRECTED, Disp: , Rfl:     flaxseed oiL 1,000 mg capsule, Take 1 capsule (1,000 mg) by mouth once daily. TAKE PER DIECTED, Disp: , Rfl:     lancets (OneTouch Delica Plus Lancet) 33 gauge misc, Use to check blood sugar once daily, Disp: 100 each, Rfl: 3    metFORMIN  mg 24 hr tablet, TAKE 2 TABLETS (1000 MG) BY MOUTH TWICE A DAY, Disp: 360 tablet, Rfl: 1    montelukast (Singulair) 10 mg tablet, TAKE 1 TABLET BY MOUTH EVERY DAY, Disp: 90 tablet, Rfl: 1    pantoprazole (ProtoNix) 40 mg EC tablet, TAKE 1 TABLET BY MOUTH EVERY DAY, Disp: 90 tablet, Rfl: 2    tobramycin-dexamethasone (Tobradex) ophthalmic ointment, Apply 0.5 inches to both eyes 3 times a day., Disp: 5 g, Rfl: 3    Lab Results   Component Value Date    BILITOT 0.5 12/11/2024    CALCIUM 10.9 (H) 12/11/2024    CO2 30 12/11/2024     12/11/2024    CREATININE 1.11 12/11/2024    GLUCOSE 158 (H) 12/11/2024    ALKPHOS 68 12/11/2024    K 4.4 12/11/2024    PROT 7.4 12/11/2024     12/11/2024    AST 11 12/11/2024    ALT 12 12/11/2024    BUN 20 12/11/2024    ANIONGAP 12 12/11/2024     12/15/2023    ALBUMIN 4.2 12/11/2024    GFRMALE 84 06/13/2023     Lab Results   Component Value Date    TRIG 98 03/10/2025    CHOL 126 03/10/2025    LDLCALC 67 03/10/2025    HDL 41 03/10/2025     Lab Results   Component Value Date    HGBA1C 6.9 (A) 10/04/2024    HGBA1C 6.5 03/18/2024    HGBA1C 6.8 (H) 10/04/2023       The ASCVD Risk score (Andi  JOE, et al., 2019) failed to calculate for the following reasons:    The valid total cholesterol range is 130 to 320 mg/dL    Assessment & Plan  Type 2 diabetes mellitus with hypoglycemia without coma, without long-term current use of insulin    Orders:    POCT glycosylated hemoglobin (Hb A1C) manually resulted    FreeStyle Ana Lilia 3 [place] misc; Use as instructed       Comment: A1c remains at goal.  He is no longer having true low blood sugars since stopping glimepiride.  He does get some low alarms to ana lilia but appears to be likely compression lows.  I recommended getting a reader for the Ana Lilia because caregiver is using her phone.  She cannot be without her phone.  I recommended lowering metformin and continue Jardiance.  Discussed importance of Jardiance for heart and kidneys.   Patient is not too fond of the CGM but willing to wear longer as we are adjusting his medications.  Would give another 4-6 weeks.  After this can go back to once daily fingersticks alternating time of day.    Short term follow up with PharmD to make sure all is well with adjustment of metformin and ideally blood sugars are still in range.  Given age, developmental delay, and co-morbidities, recommend A1c 7.5% or less.     Plan:   Reduce metformin  mg twice daily with breakfast and dinner  Continue Jardiance 10 mg once daily   Continue Ana Lilia 3+ for now   Start using reader   Follow up with Will in about 1 month   If doing well in 1 month, consider stopping Ana Lilia   Transition back PCP

## 2025-04-29 NOTE — PATIENT INSTRUCTIONS
Reduce metformin  mg twice daily with breakfast and dinner    Continue Jardiance 10 mg once daily     Continue Ana Lilia 3+ for now   Start using reader     Follow up with Will in about 1 month     If doing well in 1 month, consider stopping Ana Lilia     Transition back PCP

## 2025-04-30 DIAGNOSIS — E78.2 MIXED HYPERLIPIDEMIA: ICD-10-CM

## 2025-04-30 RX ORDER — ATORVASTATIN CALCIUM 40 MG/1
40 TABLET, FILM COATED ORAL DAILY
Qty: 90 TABLET | Refills: 3 | Status: SHIPPED | OUTPATIENT
Start: 2025-04-30

## 2025-05-24 DIAGNOSIS — H40.1133 PRIMARY OPEN ANGLE GLAUCOMA (POAG) OF BOTH EYES, SEVERE STAGE: ICD-10-CM

## 2025-05-24 DIAGNOSIS — H40.10X2 OPEN-ANGLE GLAUCOMA OF LEFT EYE, MODERATE STAGE, UNSPECIFIED OPEN-ANGLE GLAUCOMA TYPE: Primary | ICD-10-CM

## 2025-05-24 RX ORDER — BRIMONIDINE TARTRATE 2 MG/ML
1 SOLUTION/ DROPS OPHTHALMIC 2 TIMES DAILY
Qty: 10 ML | Refills: 3 | Status: SHIPPED | OUTPATIENT
Start: 2025-05-24

## 2025-06-02 ENCOUNTER — APPOINTMENT (OUTPATIENT)
Dept: ENDOCRINOLOGY | Facility: CLINIC | Age: 70
End: 2025-06-02
Payer: MEDICARE

## 2025-06-02 DIAGNOSIS — E11.649 TYPE 2 DIABETES MELLITUS WITH HYPOGLYCEMIA WITHOUT COMA, WITHOUT LONG-TERM CURRENT USE OF INSULIN: ICD-10-CM

## 2025-06-02 PROCEDURE — 95251 CONT GLUC MNTR ANALYSIS I&R: CPT

## 2025-06-02 PROCEDURE — 99211 OFF/OP EST MAY X REQ PHY/QHP: CPT

## 2025-06-03 ENCOUNTER — APPOINTMENT (OUTPATIENT)
Dept: OPHTHALMOLOGY | Facility: CLINIC | Age: 70
End: 2025-06-03
Payer: MEDICARE

## 2025-06-03 DIAGNOSIS — H40.10X2 OPEN-ANGLE GLAUCOMA OF LEFT EYE, MODERATE STAGE, UNSPECIFIED OPEN-ANGLE GLAUCOMA TYPE: ICD-10-CM

## 2025-06-03 DIAGNOSIS — H18.421 BAND KERATOPATHY OF RIGHT EYE: Primary | ICD-10-CM

## 2025-06-03 PROCEDURE — 99213 OFFICE O/P EST LOW 20 MIN: CPT | Performed by: OPHTHALMOLOGY

## 2025-06-03 PROCEDURE — 76512 OPH US DX B-SCAN: CPT | Mod: RIGHT SIDE | Performed by: OPHTHALMOLOGY

## 2025-06-03 RX ORDER — VIT C/E/CUPERIC/ZINC/LUTEIN 226-90-0.8
1 CAPSULE ORAL 2 TIMES DAILY
COMMUNITY

## 2025-06-03 ASSESSMENT — PACHYMETRY
OS_CT(UM): 556
OD_CT(UM): 557

## 2025-06-03 ASSESSMENT — SLIT LAMP EXAM - LIDS
COMMENTS: NORMAL
COMMENTS: NORMAL

## 2025-06-03 ASSESSMENT — TONOMETRY
IOP_METHOD: TONOPEN
OS_IOP_MMHG: 15
OD_IOP_MMHG: 21

## 2025-06-03 ASSESSMENT — VISUAL ACUITY
OD_SC: NLP
METHOD: SNELLEN - LINEAR
OS_SC: CF 6"

## 2025-06-03 ASSESSMENT — CONF VISUAL FIELD
OS_INFERIOR_NASAL_RESTRICTION: 1
OD_SUPERIOR_NASAL_RESTRICTION: 1
OS_INFERIOR_TEMPORAL_RESTRICTION: 1
OD_INFERIOR_TEMPORAL_RESTRICTION: 1
OS_SUPERIOR_NASAL_RESTRICTION: 1
OD_INFERIOR_NASAL_RESTRICTION: 1
OD_SUPERIOR_TEMPORAL_RESTRICTION: 1
OS_SUPERIOR_TEMPORAL_RESTRICTION: 1

## 2025-06-03 ASSESSMENT — CUP TO DISC RATIO
OS_RATIO: 95
OD_RATIO: 99

## 2025-06-12 ENCOUNTER — LAB (OUTPATIENT)
Dept: LAB | Facility: CLINIC | Age: 70
End: 2025-06-12
Payer: MEDICARE

## 2025-06-12 DIAGNOSIS — D63.8 ANEMIA OF CHRONIC DISEASE: ICD-10-CM

## 2025-06-12 LAB
ALBUMIN SERPL BCP-MCNC: 4.1 G/DL (ref 3.4–5)
ALP SERPL-CCNC: 63 U/L (ref 33–136)
ALT SERPL W P-5'-P-CCNC: 13 U/L (ref 10–52)
ANION GAP SERPL CALC-SCNC: 12 MMOL/L (ref 10–20)
AST SERPL W P-5'-P-CCNC: 13 U/L (ref 9–39)
BASOPHILS # BLD AUTO: 0.02 X10*3/UL (ref 0–0.1)
BASOPHILS NFR BLD AUTO: 0.4 %
BILIRUB SERPL-MCNC: 0.5 MG/DL (ref 0–1.2)
BUN SERPL-MCNC: 21 MG/DL (ref 6–23)
CALCIUM SERPL-MCNC: 10.5 MG/DL (ref 8.6–10.3)
CHLORIDE SERPL-SCNC: 103 MMOL/L (ref 98–107)
CO2 SERPL-SCNC: 27 MMOL/L (ref 21–32)
CREAT SERPL-MCNC: 1.12 MG/DL (ref 0.5–1.3)
CREAT UR-MCNC: 68.6 MG/DL (ref 20–370)
EGFRCR SERPLBLD CKD-EPI 2021: 71 ML/MIN/1.73M*2
EOSINOPHIL # BLD AUTO: 0 X10*3/UL (ref 0–0.7)
EOSINOPHIL NFR BLD AUTO: 0 %
ERYTHROCYTE [DISTWIDTH] IN BLOOD BY AUTOMATED COUNT: 14 % (ref 11.5–14.5)
FERRITIN SERPL-MCNC: 53 NG/ML (ref 20–300)
GLUCOSE SERPL-MCNC: 162 MG/DL (ref 74–99)
HCT VFR BLD AUTO: 37.3 % (ref 41–52)
HGB BLD-MCNC: 12.2 G/DL (ref 13.5–17.5)
IMM GRANULOCYTES # BLD AUTO: 0.01 X10*3/UL (ref 0–0.7)
IMM GRANULOCYTES NFR BLD AUTO: 0.2 % (ref 0–0.9)
IRON SATN MFR SERPL: 29 % (ref 25–45)
IRON SERPL-MCNC: 93 UG/DL (ref 35–150)
LYMPHOCYTES # BLD AUTO: 1.22 X10*3/UL (ref 1.2–4.8)
LYMPHOCYTES NFR BLD AUTO: 25.4 %
MCH RBC QN AUTO: 32.8 PG (ref 26–34)
MCHC RBC AUTO-ENTMCNC: 32.7 G/DL (ref 32–36)
MCV RBC AUTO: 100 FL (ref 80–100)
MICROALBUMIN UR-MCNC: 50 MG/L
MICROALBUMIN/CREAT UR: 72.9 UG/MG CREAT
MONOCYTES # BLD AUTO: 0.53 X10*3/UL (ref 0.1–1)
MONOCYTES NFR BLD AUTO: 11 %
NEUTROPHILS # BLD AUTO: 3.02 X10*3/UL (ref 1.2–7.7)
NEUTROPHILS NFR BLD AUTO: 63 %
NRBC BLD-RTO: 0 /100 WBCS (ref 0–0)
PLATELET # BLD AUTO: 211 X10*3/UL (ref 150–450)
POTASSIUM SERPL-SCNC: 4.5 MMOL/L (ref 3.5–5.3)
PROT SERPL-MCNC: 7.1 G/DL (ref 6.4–8.2)
RBC # BLD AUTO: 3.72 X10*6/UL (ref 4.5–5.9)
SODIUM SERPL-SCNC: 137 MMOL/L (ref 136–145)
TIBC SERPL-MCNC: 323 UG/DL (ref 240–445)
UIBC SERPL-MCNC: 230 UG/DL (ref 110–370)
VIT B12 SERPL-MCNC: >2000 PG/ML (ref 211–911)
WBC # BLD AUTO: 4.8 X10*3/UL (ref 4.4–11.3)

## 2025-06-12 PROCEDURE — 84075 ASSAY ALKALINE PHOSPHATASE: CPT

## 2025-06-12 PROCEDURE — 83540 ASSAY OF IRON: CPT

## 2025-06-12 PROCEDURE — 85025 COMPLETE CBC W/AUTO DIFF WBC: CPT

## 2025-06-12 PROCEDURE — 82728 ASSAY OF FERRITIN: CPT

## 2025-06-12 PROCEDURE — 82607 VITAMIN B-12: CPT

## 2025-06-12 PROCEDURE — 82043 UR ALBUMIN QUANTITATIVE: CPT | Performed by: NURSE PRACTITIONER

## 2025-06-12 PROCEDURE — 36415 COLL VENOUS BLD VENIPUNCTURE: CPT

## 2025-06-17 ENCOUNTER — OFFICE VISIT (OUTPATIENT)
Dept: HEMATOLOGY/ONCOLOGY | Facility: CLINIC | Age: 70
End: 2025-06-17
Payer: MEDICARE

## 2025-06-17 VITALS
SYSTOLIC BLOOD PRESSURE: 96 MMHG | BODY MASS INDEX: 21.81 KG/M2 | OXYGEN SATURATION: 98 % | WEIGHT: 135.14 LBS | TEMPERATURE: 97.7 F | DIASTOLIC BLOOD PRESSURE: 64 MMHG | RESPIRATION RATE: 18 BRPM | HEART RATE: 81 BPM

## 2025-06-17 DIAGNOSIS — D63.8 ANEMIA OF CHRONIC DISEASE: Primary | ICD-10-CM

## 2025-06-17 DIAGNOSIS — R63.4 WEIGHT LOSS OF MORE THAN 10% BODY WEIGHT: ICD-10-CM

## 2025-06-17 DIAGNOSIS — C34.11 MALIGNANT NEOPLASM OF UPPER LOBE OF RIGHT LUNG (MULTI): ICD-10-CM

## 2025-06-17 PROCEDURE — 3044F HG A1C LEVEL LT 7.0%: CPT | Performed by: NURSE PRACTITIONER

## 2025-06-17 PROCEDURE — 3078F DIAST BP <80 MM HG: CPT | Performed by: NURSE PRACTITIONER

## 2025-06-17 PROCEDURE — 99214 OFFICE O/P EST MOD 30 MIN: CPT | Performed by: NURSE PRACTITIONER

## 2025-06-17 PROCEDURE — 1126F AMNT PAIN NOTED NONE PRSNT: CPT | Performed by: NURSE PRACTITIONER

## 2025-06-17 PROCEDURE — 3074F SYST BP LT 130 MM HG: CPT | Performed by: NURSE PRACTITIONER

## 2025-06-17 PROCEDURE — 3060F POS MICROALBUMINURIA REV: CPT | Performed by: NURSE PRACTITIONER

## 2025-06-17 PROCEDURE — 1159F MED LIST DOCD IN RCRD: CPT | Performed by: NURSE PRACTITIONER

## 2025-06-17 ASSESSMENT — PAIN SCALES - GENERAL: PAINLEVEL_OUTOF10: 0-NO PAIN

## 2025-06-17 ASSESSMENT — ENCOUNTER SYMPTOMS
UNEXPECTED WEIGHT CHANGE: 1
NEUROLOGICAL NEGATIVE: 1
CARDIOVASCULAR NEGATIVE: 1
EYE PROBLEMS: 1
DIAPHORESIS: 0
HEMATOLOGIC/LYMPHATIC NEGATIVE: 1
RESPIRATORY NEGATIVE: 1
FEVER: 0
SORE THROAT: 0
MUSCULOSKELETAL NEGATIVE: 1
GASTROINTESTINAL NEGATIVE: 1
FATIGUE: 0

## 2025-06-17 NOTE — PROGRESS NOTES
Patient ID: Benjie Waters is a 69 y.o. male.  Referring Physician: Mesreet Hope PA-C  [address]  [facility] [address]  [place] [address]  Primary Care Provider: Meseret De La Torre PA-C  Visit Type: Follow Up      Subjective    HPI  Mr Waters is mentally disabled 69yo who presents with his sister, Jesica Justice, for evaluation of anemia. Labs done 10/4/23 showed WBC 5.3, hgb 11.6 nand plt 222. There was noted macrocytosis with  with normal B12 549 and monocytosis 11.7%. CMP was normal with exception of hypercalcemia of 11.   He had colonoscopy 2021 reportedly normal. Iron levels were good in 2021 and HCV negative 8/7/21.   He is not taking oral iron.  Sister was told to stop his B12 when it got to 500, but currently has resumed it.  He was found to be EBV+.   He is heavy smoker and CT scan  4/18/24 showed spiculated RUL lesion 0.7cm with multiple smaller nodules, emphysema and CAD. No reported cough, congestion or shortness of breath.  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  He has repeat CT 4/15/25 read as stable other nodules and will follow with Dr Han for this.    He has been noted to have 10 pound weight loss over the past month but this is in postoperative lung wedge resection and taking jardiance and metformin at the same time.   He has lost another 30 pounds since that visit.    His hgb is 12 range and monocytosis is resolved.  Current iron levels are good, but B12 is low   He states he has difficulty swallowing    Review of Systems   Constitutional:  Positive for unexpected weight change (40 pounds or more). Negative for diaphoresis, fatigue and fever.   HENT:   Positive for hearing loss. Negative for lump/mass, mouth sores, nosebleeds and sore throat.    Eyes:  Positive for eye problems (legally blind).   Respiratory: Negative.     Cardiovascular:  Negative.    Gastrointestinal: Negative.    Musculoskeletal: Negative.    Skin: Negative.    Neurological: Negative.    Hematological: Negative.       Objective   BSA: 1.69 meters squared  BP 96/64 (BP Location: Right arm, Patient Position: Sitting, BP Cuff Size: Adult)   Pulse 81   Temp 36.5 °C (97.7 °F) (Core)   Resp 18   Wt 61.3 kg (135 lb 2.3 oz)   SpO2 98%   BMI 21.81 kg/m²      has a past medical history of Anemia, Aphakia, unspecified eye (10/04/2022), Arthritis, Cataract, Cerebral palsy, COPD (chronic obstructive pulmonary disease) (Multi), Coronary artery disease, Dry eye syndrome of bilateral lacrimal glands (12/27/2017), GERD (gastroesophageal reflux disease), Glaucoma, Hearing aid worn, HL (hearing loss), Hyperlipidemia, Ischemic optic neuropathy, unspecified eye (10/04/2022), Lung nodule, Personal history of other infectious and parasitic diseases, Shortness of breath, Skin cancer of nose, Smoker, Tinea unguium, and Type 2 diabetes mellitus.   has a past surgical history that includes Other surgical history (04/06/2021); Cataract extraction (04/23/2023); Colonoscopy (2021); and Lung surgery (09/2024).  Family History[1]      Benjie Waters  reports that he has been smoking cigarettes. He started smoking about 53 years ago. He has a 52.7 pack-year smoking history. He has been exposed to tobacco smoke. He has never used smokeless tobacco.  He  reports no history of alcohol use.  He  reports no history of drug use.    Physical Exam  Constitutional:       Appearance: He is ill-appearing.      Comments: Blind, wearing hearing aides, mentally disabled, cooperative, looks markedly thinner than last seen.      HENT:      Ears:      Comments: Wearing hearing aides  Eyes:      Comments: Blind, white haze   Cardiovascular:      Rate and Rhythm: Normal rate and regular rhythm.      Pulses: Normal pulses.      Heart sounds: Normal heart sounds. No murmur heard.  Pulmonary:      Effort: Pulmonary effort is  normal. No respiratory distress.      Breath sounds: Normal breath sounds. No wheezing.   Abdominal:      General: There is no distension.      Palpations: Abdomen is soft. There is no mass.      Tenderness: There is no abdominal tenderness.   Musculoskeletal:         General: No swelling or tenderness.   Lymphadenopathy:      Cervical: No cervical adenopathy.   Skin:     Coloration: Skin is not jaundiced or pale.      Findings: No bruising.   Neurological:      Motor: Weakness present.      Gait: Gait abnormal.     WBC   Date/Time Value Ref Range Status   06/12/2025 10:15 AM 4.8 4.4 - 11.3 x10*3/uL Final   12/11/2024 11:03 AM 5.6 4.4 - 11.3 x10*3/uL Final   10/22/2024 03:02 PM 6.9 4.4 - 11.3 x10*3/uL Final     nRBC   Date Value Ref Range Status   06/12/2025 0.0 0.0 - 0.0 /100 WBCs Final   12/11/2024 0.0 0.0 - 0.0 /100 WBCs Final   10/22/2024 0.0 0.0 - 0.0 /100 WBCs Final     RBC   Date Value Ref Range Status   06/12/2025 3.72 (L) 4.50 - 5.90 x10*6/uL Final   12/11/2024 3.81 (L) 4.50 - 5.90 x10*6/uL Final   10/22/2024 3.44 (L) 4.50 - 5.90 x10*6/uL Final     Hemoglobin   Date Value Ref Range Status   06/12/2025 12.2 (L) 13.5 - 17.5 g/dL Final   12/11/2024 12.4 (L) 13.5 - 17.5 g/dL Final   10/22/2024 11.2 (L) 13.5 - 17.5 g/dL Final     Hematocrit   Date Value Ref Range Status   06/12/2025 37.3 (L) 41.0 - 52.0 % Final   12/11/2024 38.1 (L) 41.0 - 52.0 % Final   10/22/2024 34.7 (L) 41.0 - 52.0 % Final     MCV   Date/Time Value Ref Range Status   06/12/2025 10:15  80 - 100 fL Final   12/11/2024 11:03  80 - 100 fL Final   10/22/2024 03:02  (H) 80 - 100 fL Final     MCH   Date/Time Value Ref Range Status   06/12/2025 10:15 AM 32.8 26.0 - 34.0 pg Final   12/11/2024 11:03 AM 32.5 26.0 - 34.0 pg Final   10/22/2024 03:02 PM 32.6 26.0 - 34.0 pg Final     MCHC   Date/Time Value Ref Range Status   06/12/2025 10:15 AM 32.7 32.0 - 36.0 g/dL Final   12/11/2024 11:03 AM 32.5 32.0 - 36.0 g/dL Final   10/22/2024 03:02  PM 32.3 32.0 - 36.0 g/dL Final     RDW   Date/Time Value Ref Range Status   06/12/2025 10:15 AM 14.0 11.5 - 14.5 % Final   12/11/2024 11:03 AM 13.7 11.5 - 14.5 % Final   10/22/2024 03:02 PM 14.5 11.5 - 14.5 % Final     Platelets   Date/Time Value Ref Range Status   06/12/2025 10:15  150 - 450 x10*3/uL Final   12/11/2024 11:03  150 - 450 x10*3/uL Final   10/22/2024 03:02  150 - 450 x10*3/uL Final     MPV   Date/Time Value Ref Range Status   10/04/2023 09:19 AM 11.4 7.5 - 11.5 fL Final   08/07/2021 09:29 AM 10.8 7.0 - 12.6 CU Final   06/03/2020 09:55 AM 11.0 7.0 - 12.6 CU Final     Neutrophils %   Date/Time Value Ref Range Status   06/12/2025 10:15 AM 63.0 40.0 - 80.0 % Final   12/11/2024 11:03 AM 57.0 40.0 - 80.0 % Final   10/22/2024 03:02 PM 62.2 40.0 - 80.0 % Final     Immature Granulocytes %, Automated   Date/Time Value Ref Range Status   06/12/2025 10:15 AM 0.2 0.0 - 0.9 % Final     Comment:     Immature Granulocyte Count (IG) includes promyelocytes, myelocytes and metamyelocytes but does not include bands. Percent differential counts (%) should be interpreted in the context of the absolute cell counts (cells/UL).   12/11/2024 11:03 AM 0.5 0.0 - 0.9 % Final     Comment:     Immature Granulocyte Count (IG) includes promyelocytes, myelocytes and metamyelocytes but does not include bands. Percent differential counts (%) should be interpreted in the context of the absolute cell counts (cells/UL).   10/22/2024 03:02 PM 0.3 0.0 - 0.9 % Final     Comment:     Immature Granulocyte Count (IG) includes promyelocytes, myelocytes and metamyelocytes but does not include bands. Percent differential counts (%) should be interpreted in the context of the absolute cell counts (cells/UL).     Lymphocytes %   Date/Time Value Ref Range Status   06/12/2025 10:15 AM 25.4 13.0 - 44.0 % Final   12/11/2024 11:03 AM 27.0 13.0 - 44.0 % Final   10/22/2024 03:02 PM 22.5 13.0 - 44.0 % Final     Monocytes %   Date/Time Value  Ref Range Status   06/12/2025 10:15 AM 11.0 2.0 - 10.0 % Final   12/11/2024 11:03 AM 12.6 2.0 - 10.0 % Final   10/22/2024 03:02 PM 12.0 2.0 - 10.0 % Final     Eosinophils %   Date/Time Value Ref Range Status   06/12/2025 10:15 AM 0.0 0.0 - 6.0 % Final   12/11/2024 11:03 AM 2.5 0.0 - 6.0 % Final   10/22/2024 03:02 PM 2.6 0.0 - 6.0 % Final     Basophils %   Date/Time Value Ref Range Status   06/12/2025 10:15 AM 0.4 0.0 - 2.0 % Final   12/11/2024 11:03 AM 0.4 0.0 - 2.0 % Final   10/22/2024 03:02 PM 0.4 0.0 - 2.0 % Final     Neutrophils Absolute   Date/Time Value Ref Range Status   06/12/2025 10:15 AM 3.02 1.20 - 7.70 x10*3/uL Final     Comment:     Percent differential counts (%) should be interpreted in the context of the absolute cell counts (cells/uL).   12/11/2024 11:03 AM 3.22 1.20 - 7.70 x10*3/uL Final     Comment:     Percent differential counts (%) should be interpreted in the context of the absolute cell counts (cells/uL).   10/22/2024 03:02 PM 4.30 1.20 - 7.70 x10*3/uL Final     Comment:     Percent differential counts (%) should be interpreted in the context of the absolute cell counts (cells/uL).     Immature Granulocytes Absolute, Automated   Date/Time Value Ref Range Status   06/12/2025 10:15 AM 0.01 0.00 - 0.70 x10*3/uL Final   12/11/2024 11:03 AM 0.03 0.00 - 0.70 x10*3/uL Final   10/22/2024 03:02 PM 0.02 0.00 - 0.70 x10*3/uL Final     Lymphocytes Absolute   Date/Time Value Ref Range Status   06/12/2025 10:15 AM 1.22 1.20 - 4.80 x10*3/uL Final   12/11/2024 11:03 AM 1.52 1.20 - 4.80 x10*3/uL Final   10/22/2024 03:02 PM 1.56 1.20 - 4.80 x10*3/uL Final     Monocytes Absolute   Date/Time Value Ref Range Status   06/12/2025 10:15 AM 0.53 0.10 - 1.00 x10*3/uL Final   12/11/2024 11:03 AM 0.71 0.10 - 1.00 x10*3/uL Final   10/22/2024 03:02 PM 0.83 0.10 - 1.00 x10*3/uL Final     Eosinophils Absolute   Date/Time Value Ref Range Status   06/12/2025 10:15 AM 0.00 0.00 - 0.70 x10*3/uL Final   12/11/2024 11:03 AM 0.14  0.00 - 0.70 x10*3/uL Final   10/22/2024 03:02 PM 0.18 0.00 - 0.70 x10*3/uL Final     Basophils Absolute   Date/Time Value Ref Range Status   06/12/2025 10:15 AM 0.02 0.00 - 0.10 x10*3/uL Final   12/11/2024 11:03 AM 0.02 0.00 - 0.10 x10*3/uL Final   10/22/2024 03:02 PM 0.03 0.00 - 0.10 x10*3/uL Final   CT chest wo IV contrast  Status: Final result     PACS Images     Show images for CT chest wo IV contrast  Signed by    Signed Time Phone Pager   Kacie Mcclellan MD 4/16/2025 11:35 [phone]     Exam Information    Status Exam Begun Exam Ended   Final 4/15/2025 10:43 4/15/2025 10:46     Study Result    Narrative & Impression   Interpreted By:  Kacie Mcclellan,   STUDY:  CT CHEST WO IV CONTRAST;  4/15/2025 10:46 am      INDICATION:  Signs/Symptoms:history of lung surgery.      COMPARISON:  08/15/2024, 04/18/2024, limited chest CT cardiac score 11/27/2023,  oldest chest CT 09/22/2021      ACCESSION NUMBER(S):  [id number]      ORDERING CLINICIAN:  ANTHONY ZAMORA      TECHNIQUE:  Helical data acquisition of the chest was obtained  without IV  contrast material.  Images were reformatted in axial, coronal, and  sagittal planes.      FINDINGS:  LUNGS and AIRWAYS:  Status post wedge resection right upper lobe.  There is no infiltrate or pleural fluid.  There are emphysematous changes of the lungs.  There are chronic interstitial changes of the lungs.      There is a 0.4 cm nodule in the right upper lobe on series 7 axial  image 139/326, unchanged compared to 09/22/2021 There is a 0.5 cm  nodule in the right lower lobe laterally on axial image 139/326,  unchanged compared to 09/22/2021. There is a 0.4 cm nodule in the  lateral segment of the right middle lobe on axial image 188/326,  unchanged compared to 09/22/2021 There is a 2 cm nodule in the right  lower lobe laterally on axial image 151/326, unchanged compared  09/22/2020 There is a 0.3 cm nodule in the anterior segment of the  left upper lobe on axial  image 148/326. This has surrounding  ground-glass opacity. This is unchanged compared to 08/15/2024,  04/18/2024 and oldest chest CT dated 09/22/2021. There is a 0.3 cm  nodule in the superior segment of the left lower lobe on series 7  axial image 148/326, unchanged compared to 09/22/2021. There is a 0.3  cm nodule in the left lower lobe on axial image 167/326, unchanged  compared to 09/22/2021 There is a 0.3 cm nodule in the left lower  lobe on axial image 202/326, unchanged compared to 09/22/2021 There  is a 0.6 cm nodule in the left lower lobe near the left hemidiaphragm  on axial image 228/326, unchanged compared to 09/22/2021 In summary,  the stability of these nodules compared to 09/22/2021 suggests benign  nodules.      ZEUS AND MEDIASTINUM:  There is no hilar mediastinal adenopathy.  There is no axillary adenopathy.          HEART and VESSELS:  There is atherosclerotic calcification of the thoracic aorta      There is enlargement of the main pulmonary outflow tract and right  and left main pulmonary arteries consistent with pulmonary artery  hypertension.      There is atherosclerotic calcification of the coronary arteries. The  study is not optimized for evaluation of coronary arteries.      The cardiac chambers are not enlarged.      No evidence of pericardial effusion.      UPPER ABDOMEN:  The visualized subdiaphragmatic structures demonstrate no remarkable  findings.      CHEST WALL and OSSEOUS STRUCTURES:  There are no suspicious osseous lesions. Multilevel degenerative  changes are present.      The chest wall is unremarkable.      There is no soft tissue abnormality.      IMPRESSION:  1. Status post wedge resection right upper lobe.  2. Stable subcentimeter pulmonary nodules compared to 09/22/2021.  Stability over this period of time is consistent with benign nodules.  3. Emphysematous changes of the lungs and chronic interstitial lung  disease.      MACRO:  None      Signed by: Kacie Mcclellan  4/16/2025 11:35 AM  Dictation workstation:   [id number]       Assessment/Plan      Concern about drastic weight loss, will check CT abd/pelvis    Anemia with macrocytosis and monocytosis in asymptomatic mentally disabled, blind man with normal B12 in Oct 2023 and normal iron and negative HCV in 2021.  Colonoscopy reportedly normal in 2021, currently without GI symptoms of diarrhea alternating with constipation.  He was noted to have hypercalcemia of 11 10/4/23 and now 10.6.  He denies specific bone or joint pain.  Hgb now 12 without monocytosis.  May be slightly low due to low B12 or recent wedge resection.      Hgb 12. 2, iron and B12 levels are good.   His ironlevbels are good .  Continue iron and B12     Heavy tobacco abuse with abnormal CT,  with wedge resection 9/26/24 found to be invasive squamous cell carcinoma 1.1cm with 0/5 LN.   CT to be done in March and followed by Dr Han.     OV 3 months, will call sister with lab results     Diagnoses and all orders for this visit:  Anemia of chronic disease  -     CBC and Auto Differential; Future  -     Comprehensive Metabolic Panel; Future  -     Ferritin; Future  -     Iron and TIBC; Future  -     Vitamin B12; Future  -     Clinic Appointment Request Follow up  -     CBC and Auto Differential; Future  -     Comprehensive Metabolic Panel; Future  -     Ferritin; Future  -     Iron and TIBC; Future  -     Immunoglobulins (IgG, IgA, IgM); Future  -     Carlock/Lambda Free Light Chain, Serum; Future  -     Serum Protein Electrophoresis + Immunofixation; Future  -     Vitamin B12; Future  -     TSH with reflex to Free T4 if abnormal; Future  -     Flow Cytometry Test; Future  -     Clinic Appointment Request; Future  Malignant neoplasm of upper lobe of right lung (Multi)  -     CT abdomen pelvis w IV contrast; Future  Weight loss of more than 10% body weight  -     CT abdomen pelvis w IV contrast; Future  -     TSH with reflex to Free T4 if abnormal; Future            Meseret Hope PA-C                              [1]   Family History  Problem Relation Name Age of Onset    No Known Problems Mother

## 2025-06-19 ENCOUNTER — HOSPITAL ENCOUNTER (OUTPATIENT)
Dept: RADIOLOGY | Facility: HOSPITAL | Age: 70
Discharge: HOME | End: 2025-06-19
Payer: MEDICARE

## 2025-06-19 DIAGNOSIS — C34.11 MALIGNANT NEOPLASM OF UPPER LOBE OF RIGHT LUNG (MULTI): ICD-10-CM

## 2025-06-19 DIAGNOSIS — R63.4 WEIGHT LOSS OF MORE THAN 10% BODY WEIGHT: ICD-10-CM

## 2025-06-19 PROCEDURE — 74177 CT ABD & PELVIS W/CONTRAST: CPT

## 2025-06-19 PROCEDURE — 2550000001 HC RX 255 CONTRASTS: Performed by: NURSE PRACTITIONER

## 2025-06-19 RX ADMIN — IOHEXOL 75 ML: 350 INJECTION, SOLUTION INTRAVENOUS at 08:08

## 2025-06-23 ENCOUNTER — DOCUMENTATION (OUTPATIENT)
Dept: HEMATOLOGY/ONCOLOGY | Facility: CLINIC | Age: 70
End: 2025-06-23
Payer: MEDICARE

## 2025-06-23 DIAGNOSIS — N32.89 BLADDER WALL THICKENING: Primary | ICD-10-CM

## 2025-06-23 NOTE — PROGRESS NOTES
Spoke with sister Jesica Justice in regard to evaluation  Mild anemia with good iron and B12.  They could reduce the B12 to MWF  CT scan done to evaluate weight loss showed 5.5cm posterior gastric diverticula and constipation.   Sister states constipation has been an issue.   She said he had normal colonoscopy in past 2 years.  Would contact GI of record and revisit to help with the diverticula and constipation.  Either of which could be cause for anorexia or weight loss  Also noted in CT bladder wall thickening.  Mr Waters wears depends and has no sense of bowel or bladder function.  His sister states he is constantly red and irritated due to depends which are always wet.   Suggest  consult to see if they could help with prophylactic infection prevention.  Will see in routine follow up otherwise  Meseret Hope PA-C

## 2025-06-24 ENCOUNTER — APPOINTMENT (OUTPATIENT)
Dept: CARDIOLOGY | Facility: CLINIC | Age: 70
End: 2025-06-24
Payer: MEDICARE

## 2025-06-30 RX ORDER — ACETAMINOPHEN 500 MG
125 TABLET ORAL DAILY
COMMUNITY

## 2025-07-01 ENCOUNTER — OFFICE VISIT (OUTPATIENT)
Dept: CARDIOLOGY | Facility: CLINIC | Age: 70
End: 2025-07-01
Payer: MEDICARE

## 2025-07-01 VITALS
DIASTOLIC BLOOD PRESSURE: 50 MMHG | HEIGHT: 65 IN | BODY MASS INDEX: 22.39 KG/M2 | SYSTOLIC BLOOD PRESSURE: 89 MMHG | HEART RATE: 70 BPM | WEIGHT: 134.4 LBS | OXYGEN SATURATION: 97 %

## 2025-07-01 DIAGNOSIS — E11.9 DIABETES MELLITUS TYPE 2 WITHOUT RETINOPATHY (MULTI): ICD-10-CM

## 2025-07-01 DIAGNOSIS — I10 BENIGN ESSENTIAL HTN: ICD-10-CM

## 2025-07-01 DIAGNOSIS — I25.10 CORONARY ARTERY DISEASE INVOLVING NATIVE CORONARY ARTERY OF NATIVE HEART WITHOUT ANGINA PECTORIS: Primary | ICD-10-CM

## 2025-07-01 PROCEDURE — 3044F HG A1C LEVEL LT 7.0%: CPT | Performed by: INTERNAL MEDICINE

## 2025-07-01 PROCEDURE — G2211 COMPLEX E/M VISIT ADD ON: HCPCS | Performed by: INTERNAL MEDICINE

## 2025-07-01 PROCEDURE — 1126F AMNT PAIN NOTED NONE PRSNT: CPT | Performed by: INTERNAL MEDICINE

## 2025-07-01 PROCEDURE — 3074F SYST BP LT 130 MM HG: CPT | Performed by: INTERNAL MEDICINE

## 2025-07-01 PROCEDURE — 3008F BODY MASS INDEX DOCD: CPT | Performed by: INTERNAL MEDICINE

## 2025-07-01 PROCEDURE — 3060F POS MICROALBUMINURIA REV: CPT | Performed by: INTERNAL MEDICINE

## 2025-07-01 PROCEDURE — 1159F MED LIST DOCD IN RCRD: CPT | Performed by: INTERNAL MEDICINE

## 2025-07-01 PROCEDURE — 1160F RVW MEDS BY RX/DR IN RCRD: CPT | Performed by: INTERNAL MEDICINE

## 2025-07-01 PROCEDURE — 99212 OFFICE O/P EST SF 10 MIN: CPT

## 2025-07-01 PROCEDURE — 3078F DIAST BP <80 MM HG: CPT | Performed by: INTERNAL MEDICINE

## 2025-07-01 PROCEDURE — 99214 OFFICE O/P EST MOD 30 MIN: CPT | Performed by: INTERNAL MEDICINE

## 2025-07-01 ASSESSMENT — ENCOUNTER SYMPTOMS
DEPRESSION: 0
LOSS OF SENSATION IN FEET: 0
OCCASIONAL FEELINGS OF UNSTEADINESS: 0

## 2025-07-01 ASSESSMENT — PAIN SCALES - GENERAL: PAINLEVEL_OUTOF10: 0-NO PAIN

## 2025-07-01 NOTE — PROGRESS NOTES
Primary Care Physician: Meseret De La Torre PA-C  Date of Visit: 07/01/2025  2:00 PM EDT  Location of visit: [facility]     Chief Complaint:   No chief complaint on file.    HPI / Summary:   Benjie Waters is a 69 y.o. male presents for new patient    ROS    Medical History:   He has a past medical history of Anemia, Aphakia, unspecified eye (10/04/2022), Arthritis, Cataract, Cerebral palsy, COPD (chronic obstructive pulmonary disease) (Multi), Coronary artery disease, Dry eye syndrome of bilateral lacrimal glands (12/27/2017), GERD (gastroesophageal reflux disease), Glaucoma, Hearing aid worn, HL (hearing loss), Hyperlipidemia, Ischemic optic neuropathy, unspecified eye (10/04/2022), Lung nodule, Personal history of other infectious and parasitic diseases, Shortness of breath, Skin cancer of nose, Smoker, Tinea unguium, and Type 2 diabetes mellitus.  Surgical Hx:   He has a past surgical history that includes Other surgical history (04/06/2021); Cataract extraction (04/23/2023); Colonoscopy (2021); and Lung surgery (09/2024).   Social Hx:   He reports that he has been smoking cigarettes. He started smoking about 53 years ago. He has a 52.7 pack-year smoking history. He has been exposed to tobacco smoke. He has never used smokeless tobacco. He reports that he does not drink alcohol and does not use drugs.  Family Hx:   His family history includes No Known Problems in his mother.   Allergies:  Allergies   Allergen Reactions    Bacitracin Unknown    Erythromycin Unknown    Latex Unknown     Outpatient Medications:  Current Outpatient Medications   Medication Instructions    acetaminophen (TYLENOL) 975 mg, oral, Every 8 hours    artificial tears, dextran-hypomel-glycerin, 0.1-0.3-0.2 % ophthalmic solution 1 drop, 4 times daily    ascorbic acid (VITAMIN C) 1,000 mg, Daily    aspirin 81 mg, Daily    atorvastatin (LIPITOR) 40 mg, oral, Daily    blood sugar diagnostic (OneTouch Verio test strips) strip  "Use to check blood sugar once daily    blood-glucose sensor (FreeStyle Ana Lilia 3 Plus Sensor) device Use to monitor blood sugar    brimonidine (AlphaGAN) 0.2 % ophthalmic solution 1 drop, Both Eyes, 2 times daily    cholecalciferol (VITAMIN D-3) 125 mcg, Daily    empagliflozin (JARDIANCE) 10 mg, oral, Daily    fexofenadine (ALLEGRA ALLERGY) 180 mg, Daily PRN    flaxseed oiL 1,000 mg, Daily    FreeStyle Ana Lilia 3 [place] misc Use as instructed    lancets (OneTouch Delica Plus Lancet) 33 gauge misc Use to check blood sugar once daily    metFORMIN XR (GLUCOPHAGE-XR) 500 mg, 2 times daily (morning and late afternoon)    montelukast (SINGULAIR) 10 mg, oral, Daily    pantoprazole (PROTONIX) 40 mg, oral, Daily    tobramycin-dexamethasone (Tobradex) ophthalmic ointment 0.5 inches, Both Eyes, 3 times daily    vit C-E-cupric-zinc-lutein (PreserVision Lutein) 226-90-0.8-5 mg capsule capsule 1 capsule, 2 times daily     Physical Exam:  Vitals:    07/01/25 1353   BP: 96/62   BP Location: Left arm   Patient Position: Sitting   BP Cuff Size: Adult   Pulse: 62   SpO2: 97%   Weight: 61 kg (134 lb 6.4 oz)   Height: 1.651 m (5' 5\")     Wt Readings from Last 5 Encounters:   07/01/25 61 kg (134 lb 6.4 oz)   06/17/25 61.3 kg (135 lb 2.3 oz)   04/29/25 62.5 kg (137 lb 11.2 oz)   04/22/25 64 kg (141 lb)   04/15/25 64 kg (141 lb)     Physical Exam  JVP not elevated. Carotid impulses are 2+ without overlying bruit.   Chest exhibits fair to good air movement with completely clear breath sounds.   The cardiac rhythm is regular with no premature beats.   Normal S1 and S2. No gallop, murmur or rub, or click.   Abdomen is soft and benign without focal tenderness.   With no lower leg edema. The pedal pulses are intact.     Last Labs:  Lab on 06/12/2025   Component Date Value    Vitamin B12 06/12/2025 >2,000 (H)     Iron 06/12/2025 93     UIBC 06/12/2025 230     TIBC 06/12/2025 323     % Saturation 06/12/2025 29     Ferritin 06/12/2025 53     Glucose " 06/12/2025 162 (H)     Sodium 06/12/2025 137     Potassium 06/12/2025 4.5     Chloride 06/12/2025 103     Bicarbonate 06/12/2025 27     Anion Gap 06/12/2025 12     Urea Nitrogen 06/12/2025 21     Creatinine 06/12/2025 1.12     eGFR 06/12/2025 71     Calcium 06/12/2025 10.5 (H)     Albumin 06/12/2025 4.1     Alkaline Phosphatase 06/12/2025 63     Total Protein 06/12/2025 7.1     AST 06/12/2025 13     Bilirubin, Total 06/12/2025 0.5     ALT 06/12/2025 13     WBC 06/12/2025 4.8     nRBC 06/12/2025 0.0     RBC 06/12/2025 3.72 (L)     Hemoglobin 06/12/2025 12.2 (L)     Hematocrit 06/12/2025 37.3 (L)     MCV 06/12/2025 100     MCH 06/12/2025 32.8     MCHC 06/12/2025 32.7     RDW 06/12/2025 14.0     Platelets 06/12/2025 211     Neutrophils % 06/12/2025 63.0     Immature Granulocytes %,* 06/12/2025 0.2     Lymphocytes % 06/12/2025 25.4     Monocytes % 06/12/2025 11.0     Eosinophils % 06/12/2025 0.0     Basophils % 06/12/2025 0.4     Neutrophils Absolute 06/12/2025 3.02     Immature Granulocytes Ab* 06/12/2025 0.01     Lymphocytes Absolute 06/12/2025 1.22     Monocytes Absolute 06/12/2025 0.53     Eosinophils Absolute 06/12/2025 0.00     Basophils Absolute 06/12/2025 0.02    Clinical Support on 06/02/2025   Component Date Value    Albumin, Urine Random 06/12/2025 50.0     Creatinine, Urine Random 06/12/2025 68.6     Albumin/Creatinine Ratio 06/12/2025 72.9 (H)    Office Visit on 04/29/2025   Component Date Value    POC HEMOGLOBIN A1c 04/29/2025 6.9 (A)    Office Visit on 02/14/2025   Component Date Value    CHOLESTEROL, TOTAL 03/10/2025 126     HDL CHOLESTEROL 03/10/2025 41     TRIGLYCERIDES 03/10/2025 98     LDL-CHOLESTEROL 03/10/2025 67     CHOL/HDLC RATIO 03/10/2025 3.1     NON HDL CHOLESTEROL 03/10/2025 85     PSA, TOTAL 03/10/2025 0.24     TSH W/REFLEX TO FT4 03/10/2025 1.59         Assessment/Plan   1.  Type 2 diabetes.  The patient has been on oral therapy including metformin 1000 mg twice daily glimepiride 4 mg  every morning.  Patient is also on losartan 25 mg daily despite blood pressure in the lower range of normal due to proteinuria.  The patient's UACR was 52 when checked on 6/13/2023.  The patient's Amaryl will be discontinued in favor of Jardiance 10 mg daily.  The patient was taken to the [facility] emergency room 10/22/2024 due to low blood glucose readings.  Patient does remain on Jardiance and glimepiride has been discontinued.  Patient is also on metformin 1000 mg twice daily.  The patient does have a Dexcom device which she is worn twice.  He is scheduled to see endocrinology.  Lab work 12/11/2024 includes a CBC hematocrit 38.1 SMA panel glucose 158 calcium 10.9 serum iron 108.  Vitamin B12 level greater than 2000.  Patient's metformin was reduced from 1000 mg twice daily to 500 mg twice daily.  Lab work from 4/29/2025 included a glycohemoglobin of of 6.9%.  CMP was normal except for the glucose of 162.  2.  Hyperlipidemia.  This patient had been on atorvastatin 20 mg daily for many years but the dose was increased to 40 mg daily several years ago.  He did have a lipid panel on 3/20/2024 with cholesterol 125 LDL 69 HDL 39 triglyceride 80.  He will continue present management.  Lab work from 3/10/2025 includes satisfactory lipid panel cholesterol 126 LDL 67 HDL 41 triglyceride 98.  Will continue the atorvastatin 40 mg daily.  TSH was 1.59 PSA 0.24.  3.  Chronic smoking 1 pack/day.  This patient did have a chest CT on 4/18/2024 showing multiple additional pulmonary nodules along with severe coronary artery calcification.  4.  Coronary artery disease.  This patient has CT coronary calcium score of 265 when performed on 11/27/2023.  EKG performed today 6/11/2024 shows sinus rhythm and is unremarkable.  Patient will be scheduled for a pharmacological nuclear stress test.  The patient did have a pharmacological nuclear stress test done on 7/8/2024 which showed normal myocardial perfusion LV ejection fraction  of greater than 65%.  Patient denies any chest discomfort.  5.  History of cerebral palsy and MRDD.  6.  COPD/squamous cell lung carcinoma.  This patient was admitted to [facility] 9/26/2024 - 9/27/2024 for robotic assisted right upper lobe wedge resection which was positive for an invasive squamous cell carcinoma lymph nodes being negative.  Repeat chest x-ray 10/22/2024 showed clear lung fields status post right upper lobe postsurgical change.  Patient has continued to lose weight over the past 1 year going from 155 pounds down to 134 pounds as of 7/1/2025.  Patient did have an abdominal and pelvic CT on 6/21/2025 showing a large gastric diverticulum along with mild intrahepatic biliary dilatation constipation and cystitis.  Patient constipated despite Metamucil.  MiraLAX and as needed said that citrate of magnesia recommended.  7.  Status post bilateral cataract extractions.  8.  Diminished hearing.  9.  Chronic anemia.  Lab work 6/12/2025 includes CBC with hemoglobin 12.2 hematocrit 37.3 serum iron 93 ferritin 53.  B12 was greater than 2000 supplement discontinued.        Orders:  No orders of the defined types were placed in this encounter.     Followup Appts:  Future Appointments   Date Time Provider Department Center   7/1/2025  2:00 PM Man Sandoval MD [id number] [facility]   7/22/2025  9:30 AM MD EFRAIN [id number] [facility]   7/31/2025  8:30 AM Xavi Bird MD [facility]   9/16/2025  9:00 AM Meseret Hope PA-C [id number] [facility]   10/14/2025 10:00 AM MD JUAN [id number] [facility]   10/14/2025 10:30 AM Stephen Perez [id number] [facility]   10/22/2025  9:30 AM EDY CT 1 WESCT [facility]   10/28/2025  9:30 AM Cyrus Johnson MD [id number] [facility]   11/24/2025  8:15 AM Panfilo Ingram MD [id number] [facility]   2/16/2026 10:30 AM Meseret De La Torre PA-C [id number] [facility]           ____________________________________________________________  MD [facility]  [place]  Assistant Clinical Professor, [facility]

## 2025-07-15 ENCOUNTER — APPOINTMENT (OUTPATIENT)
Dept: OTOLARYNGOLOGY | Facility: CLINIC | Age: 70
End: 2025-07-15
Payer: MEDICARE

## 2025-07-22 ENCOUNTER — APPOINTMENT (OUTPATIENT)
Dept: OTOLARYNGOLOGY | Facility: CLINIC | Age: 70
End: 2025-07-22
Payer: MEDICARE

## 2025-07-26 DIAGNOSIS — K21.9 CHRONIC GERD: ICD-10-CM

## 2025-07-28 RX ORDER — PANTOPRAZOLE SODIUM 40 MG/1
40 TABLET, DELAYED RELEASE ORAL DAILY
Qty: 90 TABLET | Refills: 2 | Status: SHIPPED | OUTPATIENT
Start: 2025-07-28

## 2025-07-30 NOTE — PROGRESS NOTES
NAME:Benjie Waters  DATE: 7/30/2025               Subjective:   Chief complaint: Bladder wall thickening    HPI:  69 y.o. male with CAD, HLP, Cerebral palsy, MRDD, COPD, squamous cell lung carcinoma presenting for evaluation of bladder wall thickening     Pt provides very limited history.  History provided by sister.     Voids into depends.  Has lots of accidents    Medical History[1]  Surgical History[2]  Social History     Tobacco Use    Smoking status: Every Day     Average packs/day: 1 pack/day for 52.7 years (52.7 ttl pk-yrs)     Types: Cigarettes     Start date: 1972     Passive exposure: Current    Smokeless tobacco: Never   Substance Use Topics    Alcohol use: Never     Family History[3]  [unfilled]  Medications Ordered Prior to Encounter[4]  Benjie is allergic to bacitracin, erythromycin, and latex.     Review of Systems    14 point ROS reviewed and discussed with the patient. Pertinent positives/negatives discussed in the History of Present Illness (HPI).      Objective:     Physical Exam   1. Constitutional: NAD  2. Respiratory: Unlabored breathing, no audible wheezes, no use of accessory muscles   3. Cardiovascular: No JVD, extremities perfused, no edema  4. Abdomen: Soft, non-tender, non-distended, no masses or hernia.  No CVA tenderness.    5. Skin: no visible lesions, plaque, rashes, jaundice  6. Neuro: Gait normal, no focal neurologic deficit  7. Psychiatric: Mood and affect normal and appropriate    Labs  Lab Results   Component Value Date    TESTOSTERONE 395 12/15/2023     Lab Results   Component Value Date    PSA 0.24 03/10/2025     Lab Results   Component Value Date    GFRMALE 84 06/13/2023     Lab Results   Component Value Date    CREATININE 1.12 06/12/2025     Lab Results   Component Value Date    CHOL 126 03/10/2025     Lab Results   Component Value Date    HDL 41 03/10/2025     Lab Results   Component Value Date    CHHDL 3.1 03/10/2025     Lab Results   Component Value Date    LDLF 62  06/13/2023     Lab Results   Component Value Date    VLDL 16 03/20/2024     Lab Results   Component Value Date    TRIG 98 03/10/2025     Lab Results   Component Value Date    HGBA1C 6.9 (A) 04/29/2025     Lab Results   Component Value Date    HCT 37.3 (L) 06/12/2025       Imaging  FINDINGS:  LOWER CHEST:  There is bibasilar atelectasis and emphysematous changes in the lung  bases. The heart is normal in size without evidence of significant  pericardial effusion. The distal esophagus appears unremarkable.      ABDOMEN:      LIVER:  The liver is normal in size without evidence of focal liver lesions.      BILE DUCTS:  Mild intrahepatic biliary dilatation.      GALLBLADDER:  The gallbladder is nondistended and without evidence of radiopaque  stones.      PANCREAS:  The pancreas appears unremarkable without evidence of ductal  dilatation or masses.      SPLEEN:  The spleen is normal in size without focal lesions.      ADRENAL GLANDS:  Bilateral adrenal glands appear normal.      KIDNEYS AND URETERS:  The kidneys are normal in size and enhance symmetrically.  No  hydroureteronephrosis or nephroureterolithiasis is identified.  Probable cyst is seen within the right kidney.      PELVIS:      BLADDER:  There is diffuse bladder wall thickening.      REPRODUCTIVE ORGANS:  No pelvic masses.      BOWEL:  There is a loculated collection containing fluid and air at the  posterior aspect of the stomach measuring 5.5 x 3.7 cm best seen on  series 3, image 28. The small and large bowel are normal in caliber  and demonstrate no wall thickening.   A large amount of stool is seen  throughout the colon. The appendix is not definitively identified.      VESSELS:  There is no aneurysmal dilatation of the abdominal aorta. The IVC  appears normal. There are mild atherosclerotic calcifications of the  abdominal aorta and its branches.      PERITONEUM/RETROPERITONEUM/LYMPH NODES:  No ascites or free air, no fluid collection.      No  abdominopelvic lymphadenopathy is present.      BONES AND ABDOMINAL WALL:  The abdominal wall soft tissues appear normal.      No suspicious osseous lesions are identified. Degenerative discogenic  disease is noted in the lower thoracic and lumbar spine.      IMPRESSION:  1.  Large gastric diverticulum.  2. Findings concerning for cystitis, correlation with urinalysis is  recommended.  3. Mild intrahepatic biliary dilatation, correlation with LFTs is  recommended.  4. Findings consistent with constipation.  Procedures:     PVR: 14cc  UA +nitrate, glucose    Assessment/Plan:   Benjie ACUÑA Jt presents with       1. Lower urinary tract symptoms (LUTS)      Referred for Ct finding of bladder wall thickening    Emptying bladder well, PVR low  Has no real control in regards to his voiding  Stressed bowel regimen  Will send urine for culture - will treat accordingly    Stress conservative measures    Lower Urinary Tract Symptoms (LUTS)    I educated the patient on relevant lower urinary tract anatomy and physiology with emphasis on differential diagnosis as well as contributing factors to the patient's lower urinary tract symptoms. I educated the patient on dietary and behavioral modifications pertinent to the patient's complaints. I recommended to avoid caffeine, alcohol, spicy and acidic oral intake and to regulate fluid intake and voiding (timed voiding, double voiding). I stressed the importance of avoiding constipation and recommended stool softeners unless diarrhea present. We discussed limiting fluid intake at night and elevating legs prior to bedtime.              [1]   Past Medical History:  Diagnosis Date    Anemia     Aphakia, unspecified eye 10/04/2022    Aphakia    Arthritis     Cataract     Cerebral palsy     COPD (chronic obstructive pulmonary disease) (Multi)     Coronary artery disease     Dry eye syndrome of bilateral lacrimal glands 12/27/2017    Dry eye syndrome, bilateral    GERD (gastroesophageal  reflux disease)     Glaucoma     Hearing aid worn     HL (hearing loss)     Hyperlipidemia     Ischemic optic neuropathy, unspecified eye 10/04/2022    AION (anterior ischemic optic neuropathy)    Lung nodule     right    Personal history of other infectious and parasitic diseases     History of athlete's foot    Shortness of breath     Skin cancer of nose     Smoker     Tinea unguium     Mycotic toenails    Type 2 diabetes mellitus    [2]   Past Surgical History:  Procedure Laterality Date    CATARACT EXTRACTION  04/23/2023    COLONOSCOPY  2021    LUNG SURGERY  09/2024    wedge resection and 5 lymph nodes removed    OTHER SURGICAL HISTORY  04/06/2021    Myringotomy with tube placement   [3]   Family History  Problem Relation Name Age of Onset    No Known Problems Mother     [4]   Current Outpatient Medications on File Prior to Visit   Medication Sig Dispense Refill    acetaminophen (Tylenol) 325 mg tablet Take 3 tablets (975 mg) by mouth every 8 hours.      artificial tears, dextran-hypomel-glycerin, 0.1-0.3-0.2 % ophthalmic solution Administer 1 drop into affected eye(s) 4 times a day.      ascorbic acid (Vitamin C) 1,000 mg tablet Take 1 tablet (1,000 mg) by mouth once daily.      aspirin 81 mg EC tablet Take 1 tablet (81 mg) by mouth once daily.      atorvastatin (Lipitor) 40 mg tablet TAKE 1 TABLET BY MOUTH EVERY DAY 90 tablet 3    blood sugar diagnostic (OneTouch Verio test strips) strip Use to check blood sugar once daily 100 strip 3    blood-glucose sensor (FreeStyle Ana Lilia 3 Plus Sensor) device Use to monitor blood sugar 2 each 11    brimonidine (AlphaGAN) 0.2 % ophthalmic solution Administer 1 drop into both eyes 2 times a day. 10 mL 3    cholecalciferol (Vitamin D-3) 125 mcg (5,000 units) tablet Take 1 tablet (125 mcg) by mouth once daily. as directed      empagliflozin (Jardiance) 10 mg Take 1 tablet (10 mg) by mouth once daily. 90 tablet 3    fexofenadine (Allegra Allergy) 180 mg tablet Take 1 tablet  (180 mg) by mouth once daily as needed. TAKE PER DIRECTED      flaxseed oiL 1,000 mg capsule Take 1 capsule (1,000 mg) by mouth once daily. TAKE PER DIECTED      FreeStyle Ana Lilia 3 [place] misc Use as instructed 1 each 0    lancets (OneTouch Delica Plus Lancet) 33 gauge misc Use to check blood sugar once daily 100 each 3    metFORMIN  mg 24 hr tablet Take 1 tablet (500 mg) by mouth 2 times daily (morning and late afternoon).      montelukast (Singulair) 10 mg tablet TAKE 1 TABLET BY MOUTH EVERY DAY 90 tablet 1    pantoprazole (ProtoNix) 40 mg EC tablet TAKE 1 TABLET BY MOUTH EVERY DAY 90 tablet 2    tobramycin-dexamethasone (Tobradex) ophthalmic ointment Apply 0.5 inches to both eyes 3 times a day. 5 g 3    vit C-E-cupric-zinc-lutein (PreserVision Lutein) 226-90-0.8-5 mg capsule capsule Take 1 capsule (5 mg) by mouth 2 times a day. as directed      [DISCONTINUED] pantoprazole (ProtoNix) 40 mg EC tablet TAKE 1 TABLET BY MOUTH EVERY DAY 90 tablet 2     No current facility-administered medications on file prior to visit.

## 2025-07-31 ENCOUNTER — OFFICE VISIT (OUTPATIENT)
Dept: UROLOGY | Facility: HOSPITAL | Age: 70
End: 2025-07-31
Payer: MEDICARE

## 2025-07-31 DIAGNOSIS — R39.9 LOWER URINARY TRACT SYMPTOMS (LUTS): ICD-10-CM

## 2025-07-31 DIAGNOSIS — R39.9 UTI SYMPTOMS: ICD-10-CM

## 2025-07-31 LAB
POC APPEARANCE, URINE: CLEAR
POC BILIRUBIN, URINE: NEGATIVE
POC BLOOD, URINE: NEGATIVE
POC COLOR, URINE: YELLOW
POC GLUCOSE, URINE: ABNORMAL MG/DL
POC KETONES, URINE: NEGATIVE MG/DL
POC LEUKOCYTES, URINE: NEGATIVE
POC NITRITE,URINE: POSITIVE
POC PH, URINE: 5.5 PH
POC PROTEIN, URINE: ABNORMAL MG/DL
POC SPECIFIC GRAVITY, URINE: 1.01
POC UROBILINOGEN, URINE: 0.2 EU/DL

## 2025-07-31 PROCEDURE — 81003 URINALYSIS AUTO W/O SCOPE: CPT | Mod: QW | Performed by: UROLOGY

## 2025-07-31 PROCEDURE — 1159F MED LIST DOCD IN RCRD: CPT | Performed by: UROLOGY

## 2025-07-31 PROCEDURE — 99214 OFFICE O/P EST MOD 30 MIN: CPT | Performed by: UROLOGY

## 2025-07-31 PROCEDURE — 99204 OFFICE O/P NEW MOD 45 MIN: CPT | Performed by: UROLOGY

## 2025-07-31 PROCEDURE — 3044F HG A1C LEVEL LT 7.0%: CPT | Performed by: UROLOGY

## 2025-07-31 PROCEDURE — G2211 COMPLEX E/M VISIT ADD ON: HCPCS | Performed by: UROLOGY

## 2025-08-02 LAB — BACTERIA UR CULT: ABNORMAL

## 2025-08-04 DIAGNOSIS — N39.0 URINARY TRACT INFECTION WITHOUT HEMATURIA, SITE UNSPECIFIED: ICD-10-CM

## 2025-08-04 RX ORDER — CIPROFLOXACIN 500 MG/1
500 TABLET, FILM COATED ORAL 2 TIMES DAILY
Qty: 10 TABLET | Refills: 0 | Status: SHIPPED | OUTPATIENT
Start: 2025-08-04 | End: 2025-08-09

## 2025-08-05 ENCOUNTER — APPOINTMENT (OUTPATIENT)
Dept: PHARMACY | Facility: HOSPITAL | Age: 70
End: 2025-08-05
Payer: MEDICARE

## 2025-08-11 ENCOUNTER — TELEPHONE (OUTPATIENT)
Dept: PRIMARY CARE | Facility: CLINIC | Age: 70
End: 2025-08-11
Payer: MEDICARE

## 2025-08-12 DIAGNOSIS — E11.649 TYPE 2 DIABETES MELLITUS WITH HYPOGLYCEMIA WITHOUT COMA, WITHOUT LONG-TERM CURRENT USE OF INSULIN: ICD-10-CM

## 2025-08-12 RX ORDER — METFORMIN HYDROCHLORIDE 500 MG/1
500 TABLET, EXTENDED RELEASE ORAL
Qty: 180 TABLET | Refills: 3 | Status: SHIPPED | OUTPATIENT
Start: 2025-08-12

## 2025-10-14 ENCOUNTER — APPOINTMENT (OUTPATIENT)
Dept: AUDIOLOGY | Facility: CLINIC | Age: 70
End: 2025-10-14
Payer: MEDICARE

## 2025-10-14 ENCOUNTER — APPOINTMENT (OUTPATIENT)
Dept: OTOLARYNGOLOGY | Facility: CLINIC | Age: 70
End: 2025-10-14
Payer: MEDICARE

## 2025-11-24 ENCOUNTER — APPOINTMENT (OUTPATIENT)
Dept: OPHTHALMOLOGY | Facility: CLINIC | Age: 70
End: 2025-11-24
Payer: MEDICARE

## 2026-02-16 ENCOUNTER — APPOINTMENT (OUTPATIENT)
Dept: PRIMARY CARE | Facility: CLINIC | Age: 71
End: 2026-02-16
Payer: MEDICARE

## (undated) DEVICE — GOWN, ASTOUND, XL

## (undated) DEVICE — SUTURE, ETHIBOND, XTRA, 30 IN, 0, CT-1, GREEN

## (undated) DEVICE — SPONGE, LAP, XRAY DECT, 18IN X 18IN, W/LOOP, STERILE

## (undated) DEVICE — CATHETER TRAY, SURESTEP, 16FR, URINE METER W/STATLOCK

## (undated) DEVICE — STAPLER, SUREFORM 45, DAVINCI XI

## (undated) DEVICE — DRAPE, SHEET, FAN FOLDED, HALF, 44 X 58 IN, DISPOSABLE, LF, STERILE

## (undated) DEVICE — GRASPER, FENESTRATED TIP-UP XI

## (undated) DEVICE — DRAPE, SHEET, ENDOSCOPY, GENERAL, FENESTRATED, ARMBOARD COVER, 98 X 123.5 IN, DISPOSABLE, LF, STERILE

## (undated) DEVICE — SUTURE, VICRYL, 4-0, 18 IN, PS2, UNDYED

## (undated) DEVICE — SEAL, UNIVERSAL, 5-12MM

## (undated) DEVICE — BAG, TISSUE RETRIEVAL, 10MM, ANCHOR

## (undated) DEVICE — DRAPE, ARM XI

## (undated) DEVICE — MANIFOLD, 4 PORT NEPTUNE STANDARD

## (undated) DEVICE — RELOAD, SUREFORM 45, 4.6 BLACK

## (undated) DEVICE — DRAPE, INCISE, ANTIMICROBIAL, IOBAN 2, LARGE, 17 X 23 IN, DISPOSABLE, STERILE

## (undated) DEVICE — SUTURE, VICRYL, 2-0, 36 IN, CT-1, UNDYED

## (undated) DEVICE — GAUZE, KITTNER ROLL, STERILE

## (undated) DEVICE — GRASPER, LONG, BIPOLAR, DAVINCI XI

## (undated) DEVICE — ACCESS PORT, 12MM, 100MM LENGTH, LOW PROFILE W/BLADELESS OPTICAL TIP

## (undated) DEVICE — LOOP, VESSEL, MAXI, RED

## (undated) DEVICE — FORCEPS, CADIERE, DAVINCI XI

## (undated) DEVICE — ELECTRODE, ELECTROSURGICAL, BLADE, INSULATED, ENT/IMA, STERILE

## (undated) DEVICE — DRESSING, ADHESIVE, ISLAND, TELFA, 2 X 3.75 IN, LF

## (undated) DEVICE — STRIP, SKIN CLOSURE, STERI STRIP, REINFORCED, 0.5 X 4 IN

## (undated) DEVICE — Device

## (undated) DEVICE — COVER, CART, 45 X 27 X 48 IN, CLEAR

## (undated) DEVICE — KIT, ROBOTIC, CUSTOM UHC

## (undated) DEVICE — CATHETER, THORACIC, STRAIGHT, ADULT, 28 FR, PVC

## (undated) DEVICE — TUBING SET, TRI-LUMEN, FILTERED, F/AIRSEAL

## (undated) DEVICE — SPONGE GAUZE, XRAY SC+RFID, 4X4 16 PLY, STERILE

## (undated) DEVICE — TUBING, SUCTION, CONNECTING, STERILE 0.25 X 120 IN., LF

## (undated) DEVICE — OBTURATOR, BLADELESS , SU

## (undated) DEVICE — TOWEL, SURGICAL, NEURO, O/R, 16 X 26, BLUE, STERILE

## (undated) DEVICE — DRESSING, ISLAND, TELFA, 4 X 5 IN

## (undated) DEVICE — DRAPE, COLUMN, DAVINCI XI